# Patient Record
Sex: FEMALE | Race: BLACK OR AFRICAN AMERICAN | NOT HISPANIC OR LATINO | Employment: FULL TIME | URBAN - METROPOLITAN AREA
[De-identification: names, ages, dates, MRNs, and addresses within clinical notes are randomized per-mention and may not be internally consistent; named-entity substitution may affect disease eponyms.]

---

## 2019-11-25 ENCOUNTER — TELEPHONE (OUTPATIENT)
Dept: PSYCHIATRY | Facility: CLINIC | Age: 24
End: 2019-11-25

## 2019-11-25 NOTE — TELEPHONE ENCOUNTER
Behavorial Health Outpatient Intake Questions    Referred by: N/A    Please advised interviewee that they need to answer all questions truthfully to allow for best care and any misrepresentations of information may affect their ability to be seen at this clinic   => Was this discussed? Yes     Behavorial Health Outpatient Intake History -     Presenting Problem (in patient's words): previously diagnosed schizophrenia form/med management    Has the patient ever seen or is currently seeing a psychiatrist? Yes   If yes who/when? Dr Adelaida Giraldo- saw in August 2019    If seen as outpatient, have they been seen here (and by whom)? If not seen here, which provider(s) did the patient see and for how long? Has the patient ever seen or currently see a therapist? Yes If yes who/when? [facility]-August 2019  Has a member of the patient's family been in therapy here? No  If yes, with whom? Has the patient been hospitalized for mental health? Yes   If yes, how long ago was last hospitalization and where was it? [facility]- 2011 ( 5-7 days)  Substance Abuse:No concerns of substance abuse are reported  Does the patient have ICM or CTT? Yes    Is the patient taking injectable psychiatric medications? No    => If yes, patient cannot be seen here  Communications  Are there any developmental disabilities? No    Does the patient have hearing impairment? No       History-    Has the patient served in the [place] [address]? No    If yes, have you had combat services? No    Was the patient activated into federal active duty as a member of the [employer] or reserve? No    Legal History-     Does the patient have any history of arrests, long term/senior living time, or DUIs? No  If Yes-  1) What types of charges? 2) When were they last incarcerated? 3) Are they currently on parole or probation? Minor Child-    Who has custody of the child? N/A    Is there a custody agreement?  N/A    If there is a custody agreement remind parent that they must bring a copy to the first appt or they will not be seen  Intake Team, please check with provider before scheduling if flags come up such as:  - complex case  - legal history (other than DUI)  - communication barrier concerns are present  - if, in your judgment, this needs further review    ACCEPTED as a patient Yes  => Appointment Date: Thursday, 1/16/20 @ 1pm w/ Danelle Gongora; Friday, 1/24/20 @ 1pm w/ Guy Zavaleta    Referred Elsewhere? No    Name of Insurance Co: [employer] ID# [id number]  Insurance Phone #  If ins is primary or secondary  If patient is a minor, parents information such as Name, D  O B of guarantor

## 2020-02-03 ENCOUNTER — TELEPHONE (OUTPATIENT)
Dept: PSYCHIATRY | Facility: CLINIC | Age: 25
End: 2020-02-03

## 2020-02-24 ENCOUNTER — SOCIAL WORK (OUTPATIENT)
Dept: BEHAVIORAL/MENTAL HEALTH CLINIC | Facility: CLINIC | Age: 25
End: 2020-02-24
Payer: COMMERCIAL

## 2020-02-24 DIAGNOSIS — F43.20 ADJUSTMENT DISORDER, UNSPECIFIED TYPE: Primary | ICD-10-CM

## 2020-02-24 PROCEDURE — 90791 PSYCH DIAGNOSTIC EVALUATION: CPT | Performed by: SOCIAL WORKER

## 2020-02-24 NOTE — PSYCH
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 "an episode" while at college (adding during this "episode" exclusively shredded papers in her family home); She noted had had a voluntary  admission to [facility] (one week stay reported) to include a medication regimen which, per her report, with adjustments (Abilfy 25 mg to current dosage as 5 mg of Abilify for approximately 10 years)  Following her  admission and subsequent discharge, resumed her studies during Spring Semester, 2012    "I am working on my time management skills and sleep " states her PCP (since, August, 2019) has been managing her medication regimen; "I am reconnecting with my brothers (since her hospitalization)  " "Where have I been for the past 9 yeas ("since I got sick")  Previous Psychiatric/psychological treatment/year: [facility] psych since fall, 2011; "It was good "     Current Psychiatrist/Therapist: Psychiatric Evaluation scheduled with MEHNAZ Garcia, on 3/24/2020; Outpatient and/or Partial and Other Community Resources Used (CTT, ICM, VNA): Outpatient outpatient      Problem Assessment:     SOCIAL/VOCATION:  Family Constellation (include parents, relationship with each and pertinent Psych/Medical History):     No family history on file  Mother: age 62 (approximately); "We have our ups and downs  We are close, but I am very annoying  She has her moments as well "    Spouse: n/a    Father: dec'd since [address] age 8 or 7;    Children: n/a    ibling: alan, age 29; Client; alan, age 25; bro, age 25; Other: n/a    Geri relates best to my older brother, Chito Banda, my mom  she lives with three other girls (college students)  she does not live alone       Domestic Violence: No past history of domestic violence; no history sexual abuse reported; Additional Comments related to family/relationships/peer support:  Immigrated to the [address] from [place] when age 8 or 5;          School or Work History (strengths/limitations/needs):  undergraduate major: Psychology at [employer]; currently in graduate school at [employer] with a major in education with certification in [address] and [address]; 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[facility]) [address] [place] [address] and psych; "It is a good thing I am teaching  I am learning (teaching) strategies "    Her highest grade level achieved was 12 th grade     history includes: n/a    Financial status includes    LEISURE ASSESSMENT (Include past and present hobbies/interests and level of involvement (Ex: Group/Club Affiliations): music ("I am starting [employer] "); reading;   her primary language is [place]  Preferred language is [place]  Ethnic considerations are [place]  Religions affiliations and level of involvement [employer]   Does spirituality help you cope? No    FUNCTIONAL STATUS: There has been a recent change in [address] [facility] ability to do the following: n/a    Level of Assistance Needed/By Whom?: n/a    Geri learns best by  reading    SUBSTANCE ABUSE ASSESSMENT: no substance abuse    Substance/Route/Age/Amount/Frequency/Last Use: n/a    DETOX HISTORY: n/a    Previous detox/rehab treatment: n/a    HEALTH ASSESSMENT: PCP not notified     LEGAL: No Mental Health Advance Directive or Power of  on file    Prenatal History: N/A    Delivery History: N/A    Developmental Milestones: N/A  Temperament as an infant was N/A  Temperament as a toddler was N/A  Temperament at school age was N/A  Temperament as a teenager was N/A      Risk Assessment:   The following ratings are based on my observation of this patient over the last interview    Risk of Harm to Self:   Demographic risk factors include never  or  status  Historical Risk Factors include n/a  Recent Specific Risk Factors include "I can be hard on myself at times "  Additional Factors for a Child or Adolescent n/a    Risk of Harm to Others:   Demographic Risk Factors include reports immigrated to [place] at age 8 or 7;  Historical Risk Factors include "I did not have any friends through high school and undergrad "  Recent Specific Risk Factors include multiple stressors    Access to Weapons:   Alysa Armendariz has access to the following weapons: n/a   The following steps have been taken to ensure weapons are properly secured: n/a    Based on the above information, the client presents the following risk of harm to self or others:  low    The following interventions are recommended:   no intervention changes    Notes regarding this Risk Assessment:         Review Of Systems:     Mood Anxiety and Depression   Behavior Normal    Thought Content Normal   General Emotional Problems   Personality Normal   Other Psych Symptoms Normal   Constitutional n/a   ENT n/a   Cardiovascular n/a   Respiratory n/a   Gastrointestinal n/a   Genitourinary n/a   Musculoskeletal n/a   Integumentary n/a   Neurological n/a   Endocrine n/a         Mental status:  Appearance calm and cooperative , adequate hygiene and grooming and good eye contact    Mood mood appropriate   Affect affect was broad   Speech a normal rate   Thought Processes coherent/organized and normal thought processes   Hallucinations no hallucinations present    Thought Content no delusions   Abnormal Thoughts no suicidal thoughts  and no homicidal thoughts    Orientation  oriented to person and place and time   Remote Memory short term memory intact and long term memory intact   Attention Span concentration intact   Intellect Appears to be of Average Intelligence   Fund of Knowledge n/a   Insight Insight intact   Judgement judgment was intact   Muscle Strength n/a   Language n/a   Pain none   Pain Scale 0

## 2020-04-16 ENCOUNTER — TELEMEDICINE (OUTPATIENT)
Dept: BEHAVIORAL/MENTAL HEALTH CLINIC | Facility: CLINIC | Age: 25
End: 2020-04-16
Payer: COMMERCIAL

## 2020-04-16 ENCOUNTER — DOCUMENTATION (OUTPATIENT)
Dept: BEHAVIORAL/MENTAL HEALTH CLINIC | Facility: CLINIC | Age: 25
End: 2020-04-16

## 2020-04-16 DIAGNOSIS — F43.20 ADJUSTMENT DISORDER, UNSPECIFIED TYPE: Primary | ICD-10-CM

## 2020-04-16 PROCEDURE — 90834 PSYTX W PT 45 MINUTES: CPT | Performed by: SOCIAL WORKER

## 2020-04-30 ENCOUNTER — TELEMEDICINE (OUTPATIENT)
Dept: BEHAVIORAL/MENTAL HEALTH CLINIC | Facility: CLINIC | Age: 25
End: 2020-04-30
Payer: COMMERCIAL

## 2020-04-30 DIAGNOSIS — F43.20 ADJUSTMENT DISORDER, UNSPECIFIED TYPE: Primary | ICD-10-CM

## 2020-04-30 PROCEDURE — 90834 PSYTX W PT 45 MINUTES: CPT | Performed by: SOCIAL WORKER

## 2020-05-14 ENCOUNTER — TELEMEDICINE (OUTPATIENT)
Dept: BEHAVIORAL/MENTAL HEALTH CLINIC | Facility: CLINIC | Age: 25
End: 2020-05-14
Payer: COMMERCIAL

## 2020-05-14 DIAGNOSIS — F43.20 ADJUSTMENT DISORDER, UNSPECIFIED TYPE: Primary | ICD-10-CM

## 2020-05-14 PROCEDURE — 90834 PSYTX W PT 45 MINUTES: CPT | Performed by: SOCIAL WORKER

## 2020-06-11 ENCOUNTER — TELEMEDICINE (OUTPATIENT)
Dept: BEHAVIORAL/MENTAL HEALTH CLINIC | Facility: CLINIC | Age: 25
End: 2020-06-11
Payer: COMMERCIAL

## 2020-06-11 DIAGNOSIS — F43.20 ADJUSTMENT DISORDER, UNSPECIFIED TYPE: Primary | ICD-10-CM

## 2020-06-11 PROCEDURE — 90832 PSYTX W PT 30 MINUTES: CPT | Performed by: SOCIAL WORKER

## 2020-07-24 ENCOUNTER — TELEMEDICINE (OUTPATIENT)
Dept: BEHAVIORAL/MENTAL HEALTH CLINIC | Facility: CLINIC | Age: 25
End: 2020-07-24
Payer: COMMERCIAL

## 2020-07-24 DIAGNOSIS — F43.20 ADJUSTMENT DISORDER, UNSPECIFIED TYPE: Primary | ICD-10-CM

## 2020-07-24 PROCEDURE — 90832 PSYTX W PT 30 MINUTES: CPT | Performed by: PSYCHIATRY & NEUROLOGY

## 2020-07-24 NOTE — PSYCH
Virtual Regular Visit      Assessment/Plan:    Problem List Items Addressed This Visit        Other    Adjustment disorder - Primary      Session time 4876-3703 (total time 35 minutes)         Reason for visit is No chief complaint on file  Encounter provider OFE Tanner    Provider located at [address] [facility]  [address]  [place] [address]      Recent Visits  No visits were found meeting these conditions  Showing recent visits within past 7 days and meeting all other requirements     Future Appointments  No visits were found meeting these conditions  Showing future appointments within next 150 days and meeting all other requirements        The patient was identified by name and date of birth  Dane Marquez was informed that this is a telemedicine visit and that the visit is being conducted through [employer]  My office door was closed  No one else was in the room  She acknowledged consent and understanding of privacy and security of the video platform  The patient has agreed to participate and understands they can discontinue the visit at any time  Patient is aware this is a billable service  Subjective  Dane Marquez is a 30 y o  female presenting for initial session with this therapist after snf of her previous therapist [facility]  Geri shared  That she is getting ready to move out of her mom's apartment back up to [facility] 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   She teaches political science, 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 [place], and at this point does not know if the school will be in person, virtual or hybrid, which is creating a little bit of anxiety for her, but not too much    She said that she had been working with David Butcher on trying to reflect on situations and not rush to judgment or reacting rashly, and provided an example of ways that she has been implementing her work on this skill  Today she was ordering a new mattress for her new apartment, and the agent she was ordering from put in the wrong address at first   The agent made the correction after the purchase was completed, and told Geri that she had notified the warehouse of the correct address  Geri asked the agent three times if she was sure that the information was corrected and that the warehouse definitely got the correct address  The agent abruptly told her that it was taken care of and ended the chat  Luz Marina Irving said that she could have gotten angry or upset about that interaction, but took a minute to think about it, decided that she did not think she was being unreasonable trying to be sure that something she was spending a lot of money on would be delivered correctly, and that maybe the agent was just tired or was not able to manage the conversation as well as could have been done  She decided not to let it upset her and planned to contact another agent to confirm that the change was made instead, just to alleviate any anxiety that she might feel about the issue  Validated Geri's concerns and her need to make sure her purchase was completed correctly, and encouraged her for being able to take a few moments to think through her reaction and not allow it to upset her or ruin the rest of her day  A: Luz Marina Irving presented as pleasant, cheerful and openly engaged with this writer, and willingly shared what she wants to work on moving forward in therapy  She admits to sometimes overthinking things, although seems to not consider this as anxiety  P: Will continue to work with Luz Marina Irving to help her identify situations that may cause her to overthink or worry, and build coping strategies to help manage these reactions  Goals addressed: 1        HPI     No past medical history on file  No past surgical history on file  No current outpatient medications on file       No current facility-administered medications for this visit  Allergies not on file    Review of Systems    Video Exam    There were no vitals filed for this visit  Physical Exam     Psychiatric: calm and cooperative with good eye contact; mood euthymic, affect bright; thought process logical and organized; concentration intact; short and long term memory grossly intact; speech normal rate, volume and fluency; insight and judgment intact; denies SI HI and psychosis  I spent 35 minutes directly with the patient during this visit      VIRTUAL VISIT DISCLAIMER    Anusha Herbert acknowledges that she has consented to an online visit or consultation  She understands that the online visit is based solely on information provided by her, and that, in the absence of a face-to-face physical evaluation by the physician, the diagnosis she receives is both limited and provisional in terms of accuracy and completeness  This is not intended to replace a full medical face-to-face evaluation by the physician  Anusha Herbert understands and accepts these terms

## 2020-08-13 ENCOUNTER — TELEMEDICINE (OUTPATIENT)
Dept: BEHAVIORAL/MENTAL HEALTH CLINIC | Facility: CLINIC | Age: 25
End: 2020-08-13
Payer: COMMERCIAL

## 2020-08-13 DIAGNOSIS — F43.20 ADJUSTMENT DISORDER, UNSPECIFIED TYPE: Primary | ICD-10-CM

## 2020-08-13 PROCEDURE — 90834 PSYTX W PT 45 MINUTES: CPT | Performed by: PSYCHIATRY & NEUROLOGY

## 2020-08-13 NOTE — PSYCH
Virtual Regular Visit      Assessment/Plan:    Problem List Items Addressed This Visit        Other    Adjustment disorder - Primary        Session time [id number] (total time 49 minutes)       Reason for visit is No chief complaint on file  Encounter provider OFE Darden    Provider located at [address] [facility]  [address]  [facility] [address]      Recent Visits  No visits were found meeting these conditions  Showing recent visits within past 7 days and meeting all other requirements     Future Appointments  No visits were found meeting these conditions  Showing future appointments within next 150 days and meeting all other requirements        The patient was identified by name and date of birth  Jayy Morillo was informed that this is a telemedicine visit and that the visit is being conducted through [employer]  My office door was closed  No one else was in the room  She acknowledged consent and understanding of privacy and security of the video platform  The patient has agreed to participate and understands they can discontinue the visit at any time  Patient is aware this is a billable service  Subjective  Jayy Morillo is a 30 y o  female following up for adjustment disorder  Geri shared that she is now settled into her new apartment and is preparing to start school in about two weeks  She shared some anticipatory anxiety about going back into the school environment with Constantino shirley going on, but said that she feels the school is doing what they can to be proactive in taking care of students and teachers  She 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, so is busy trying to get ready for both  She talked about some incidents where she felt angry, but was able to allow herself to let go of her anger sooner 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)    She talked about dating online, not having any success with that, but wanting to have a relationship at this point in her life  She is "tired of being with myself," and is seeking companionship, but does not have a lot of social options at work and is not one to go out and socialize  She is open to trying online dating, and is willing to give it some time, because she wants to find a partner  A: Caryn Hamman continues to work on letting go of overthinking and holding onto anger, yet allowing herself to feel anger when justified  She now is more focused on finding a partner to fill emotional void at this stage in her life  P: Caryn Hamman will continue to work on letting go of unnecessary anger, and will continue to find ways to socialize and engage in relationships in order to feel more connected socially, potentially with a life partner   HPI     No past medical history on file  No past surgical history on file  No current outpatient medications on file  No current facility-administered medications for this visit  Allergies not on file    Review of Systems    Video Exam    There were no vitals filed for this visit  Physical Exam     I spent 49 minutes directly with the patient during this visit      VIRTUAL VISIT DISCLAIMER    Nathan Hu acknowledges that she has consented to an online visit or consultation  She understands that the online visit is based solely on information provided by her, and that, in the absence of a face-to-face physical evaluation by the physician, the diagnosis she receives is both limited and provisional in terms of accuracy and completeness  This is not intended to replace a full medical face-to-face evaluation by the physician  Nathan Hu understands and accepts these terms

## 2020-08-26 ENCOUNTER — TELEMEDICINE (OUTPATIENT)
Dept: PSYCHIATRY | Facility: CLINIC | Age: 25
End: 2020-08-26
Payer: COMMERCIAL

## 2020-08-26 VITALS — WEIGHT: 122 LBS | BODY MASS INDEX: 23.95 KG/M2 | HEIGHT: 60 IN

## 2020-08-26 DIAGNOSIS — F25.0 SCHIZOAFFECTIVE DISORDER, BIPOLAR TYPE (HCC): Primary | ICD-10-CM

## 2020-08-26 DIAGNOSIS — I10 BENIGN ESSENTIAL HYPERTENSION: ICD-10-CM

## 2020-08-26 PROBLEM — F20.9 SCHIZOPHRENIA (HCC): Status: ACTIVE | Noted: 2020-08-04

## 2020-08-26 PROCEDURE — 90791 PSYCH DIAGNOSTIC EVALUATION: CPT | Performed by: NURSE PRACTITIONER

## 2020-08-26 RX ORDER — AMLODIPINE BESYLATE 5 MG/1
TABLET ORAL
COMMUNITY
Start: 2020-05-04

## 2020-08-26 RX ORDER — HYDROCHLOROTHIAZIDE 12.5 MG/1
TABLET ORAL
COMMUNITY
Start: 2020-08-05

## 2020-08-26 RX ORDER — LORATADINE 10 MG/1
TABLET ORAL
COMMUNITY
Start: 2020-07-21

## 2020-08-26 RX ORDER — ARIPIPRAZOLE 10 MG/1
5 TABLET ORAL DAILY
COMMUNITY
Start: 2017-10-12 | End: 2020-08-26 | Stop reason: SDUPTHER

## 2020-08-26 RX ORDER — MULTIVITAMIN
1 TABLET ORAL
COMMUNITY

## 2020-08-26 RX ORDER — ARIPIPRAZOLE 10 MG/1
TABLET ORAL
Qty: 30 TABLET | Refills: 1 | Status: SHIPPED | OUTPATIENT
Start: 2020-08-26 | End: 2020-12-28 | Stop reason: SDUPTHER

## 2020-08-26 RX ORDER — NORGESTIMATE AND ETHINYL ESTRADIOL 7DAYSX3 LO
1 KIT ORAL DAILY
COMMUNITY
Start: 2020-08-04 | End: 2021-08-04

## 2020-08-26 RX ORDER — OMEPRAZOLE 40 MG/1
CAPSULE, DELAYED RELEASE ORAL
COMMUNITY
Start: 2020-07-05

## 2020-08-26 NOTE — PSYCH
Virtual Regular Visit      Assessment/Plan:    Problem List Items Addressed This Visit        Cardiovascular and Mediastinum    Benign essential hypertension    Relevant Medications    amLODIPine (NORVASC) 5 mg tablet    hydrochlorothiazide (HYDRODIURIL) 12 5 mg tablet    ARIPiprazole (ABILIFY) 10 mg tablet    Other Relevant Orders    ECG 12 lead    CBC and differential    Comprehensive metabolic panel    Lipid panel    HEMOGLOBIN A1C W/ EAG ESTIMATION    TSH, 3rd generation with Free T4 reflex       Other    Schizoaffective disorder, bipolar type (HCC) - Primary    Relevant Medications    ARIPiprazole (ABILIFY) 10 mg tablet    Other Relevant Orders    ECG 12 lead    CBC and differential    Comprehensive metabolic panel    Lipid panel    HEMOGLOBIN A1C W/ EAG ESTIMATION    TSH, 3rd generation with Free T4 reflex               Reason for visit is   Chief Complaint   Patient presents with    Virtual Regular Visit    Mood Swings    Psychosis    Establish Care    Anxiety    Sleeping Problem        Encounter provider Kristina Lucia, [address] [place] [address]    Provider located at [address] [place] [address] [address]  [facility] [id number]      Recent Visits  No visits were found meeting these conditions  Showing recent visits within past 7 days and meeting all other requirements     Today's Visits  Date Type Provider Dept   08/26/20 [address] today's visits and meeting all other requirements     Future Appointments  No visits were found meeting these conditions  Showing future appointments within next 150 days and meeting all other requirements        The patient was identified by name and date of birth  Hermes Sanders was informed that this is a telemedicine visit and that the visit is being conducted through [employer]  My office door was closed  No one else was in the room    She acknowledged consent and understanding of privacy and security of the video platform  The patient has agreed to participate and understands they can discontinue the visit at any time  Patient is aware this is a billable service  Past Medical History:   Diagnosis Date    Acid reflux     Schizoaffective disorder ([facility] [address] )        History reviewed  No pertinent surgical history  Current Outpatient Medications   Medication Sig Dispense Refill    amLODIPine (NORVASC) 5 mg tablet       ARIPiprazole (ABILIFY) 10 mg tablet Take 1/2 tab (5mg) PO daily 30 tablet 1    hydrochlorothiazide (HYDRODIURIL) 12 5 mg tablet       loratadine (CLARITIN) 10 mg tablet       Multiple Vitamin (multivitamin) tablet Take 1 tablet by mouth      norgestimate-ethinyl estradiol (ORTHO TRI-CYCLEN LO) 0 18/0 215/0 25 MG-25 MCG per tablet Take 1 tablet by mouth daily      omeprazole (PriLOSEC) 40 MG capsule        No current facility-administered medications for this visit  Allergies   Allergen Reactions    Other      Seasonal allergy           Vitals:    08/26/20 0912   Weight: 55 3 kg (122 lb)   Height: 5' (1 524 m)         I spent 65 minutes directly with the patient during this visit      VIRTUAL VISIT DISCLAIMER    Renae Del Cid acknowledges that she has consented to an online visit or consultation  She understands that the online visit is based solely on information provided by her, and that, in the absence of a face-to-face physical evaluation by the physician, the diagnosis she receives is both limited and provisional in terms of accuracy and completeness  This is not intended to replace a full medical face-to-face evaluation by the physician  Renae Nehemias understands and accepts these terms       [address]    Name and Date of Birth:  Renae Del Cid 30 y o  1995 MRN: [id number]    Date of Visit: August 26, 2020    Sources of information: Information for this evaluation was gathered through direct interview with the patient  Additionally patient the patient of Nancy sutton at [facility]    Confidentiality discussion: Limits of confidentiality in cases of safety concerns involving self and others as well as this physician's role as a mandatory  of abuse  They voiced understanding and a desire to continue with the evaluation    Reason for visit:   Chief Complaint   Patient presents with    Virtual Regular Visit    Mood Swings    Psychosis    Establish Care    Anxiety    Sleeping Problem     HPI     David Winn is a 30 y o  female with a history of Schizoaffective bipolar type, adjustment disorder and sleep disturbance with potential OCD symptoms who presents for psychiatric evaluation due to for psychiatric medication management  David Winn states she was born in [facility] to [facility] and father who passed away when she was 8 or 5  She states to her knowledge she met all of her developmental milestones  She has one older brother and 2 younger brothers  (of note they all have same 1st name and different 2nd names as this is how names are delineated in [facility])  She states she moved to the [address] when she was in 2nd grade, she states she came on her own and moved in with her mom, maternal grandmother and aunt  She states her mom was already in the [address] prior to her arrival in the [address] (She states her brothers came to the [address] after her-they were staying with one of her mother's good friends in [place] [facility])  David Winn states her childhood was good growing up "everything at home was fine, school was fine even though I did not have a lot of friends I loved doing my work "  She states she was in advanced classes in high school and she graduated on time  She went to [employer] and 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 [employer]        She reports one inpatient psychiatric admission was during her freshman year of college at [employer] (first semester), she states "I had an episode "  She states after she graduated from high school she was watching Anime on the computer all day and night and she was not sleeping (her lack of sleep started senior year of high school 2-3 hours of sleep per night and she states she started falling asleep in class)  She states her   attempted to get her help but "I graduated before he could get me the help I needed "  She states she became almost addicted to [place], comics and Anime and she was not sleeping "I just wanted to read and see more "      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 she was trying to memorize a lot of data from the books  "I was trying to memorize everything word for word from the book, it was a terrible time because I couldn't do it "  She states the lack of sleep and stress lead to her having the episode of not going to school one day, (she commuted back and forth to [facility]) she states she was sitting at home shredding paper with her hands for 8 hours and her mom called for help  She states she was threatening her family, screaming and shouting and she was taken for IP psychiatric eval       Scince that time she has been on Risperdal which she did not like as there was weight gain and did not feel well and was not helping (stopped after 1 month), started on Abilify-was up to 25 mg but has since been titrated down and has been on 5 mg now for approximately 2 years and she has been doing well  She has been in counseling  She continues to have moments of paranoia  In terms of depression, the patient endorses change in sleep, depressed mood, loss of energy, loss of interests/pleasure, trouble concentrating This was during the episode in 2011      PANDA symptoms: no symptoms suggestive of PANDA    Panic Disorder symptoms: no symptoms suggestive of panic disorder    Social Anxiety symptoms: no symptoms suggestive of social anxiety    OCD Symptoms: No significant symptoms supportive of OCD however I think it is important to note that patient's episode occurred due to her obsessive desires to watch Anime, read [employer], read comics and plan the computer  This led to obsessive hours and thoughts about needing to be involved in a virtual world  She reports that she was unable to focus on other activities and she would not sleep, she would stay up all night engaged in the above activities  This started during her senior year of high school and progressed over the summer leading to significant lack of sleep prior to her going away to college her freshman year  She states she attempted to keep up with the use of sessions as well as study and memorize information in her [employer] major as well as commute back and forth to [facility] which led to her episode    She reports not sleeping more than 2 hours per night for several months  In terms of jonnathan, the patient endorses yes, one episode  Symptoms include Irritability, decreased sleep , distractibility, increased goal-directed behavior and psychomotor agitation    Eating Disorder symptoms: no historical or current eating disorder  no binge eating disorder; no anorexia nervosa  no symptoms of bulimia    In terms of PTSD, the patient endorses exposure to trauma involving:  Denies symptoms of PTSDIn terms of psychotic symptoms, the patient reports No psychotic symptoms now but history of, auditory hallucinations during episode in 2011  Symptoms first started abruptly 10 years ago and improved and remained stable over the last 1 and 1/2 years  Stressors preceding visit included COVID-19 issues  She states she knows she has to stay away from the news and she knows she has to stay on medication and keep up with therapy  She denies suicidal ideation, intent or plan at present, denies homicidal ideation, intent or plan at present      She denies auditory hallucinations, denies visual hallucinations, still has periodic vague paranoid ideation  She denies any side effects from current psychiatric medications        Psychiatric Review Of Systems:    Sleep changes: struggles with sleep since 2011 (current to bed at 11:30pm-7am) no nightmares  Appetite changes: no  Weight changes: weight loss 22 lb  Took sharonatley 2 years to decrease weight as she was attempting to get back to normal weight  Energy/anergy: no change  Interest/pleasure/anhedonia: no  Somatic symptoms: no  Anxiety/panic: worrying daily  Niharika: no  Guilty/hopeless: no  Self injurious behavior/risky behavior: no  Suicidal ideation: no  Homicidal ideation: no  Auditory hallucinations: not at present, past auditory hallucinations  Visual hallucinations: no  Other hallucinations: no  Delusional thinking: yes, When she had event in 2011 felt paranoid as "though people were watching and staring at me and judging me" She states at times if someone is staring at he she "will feel slightly paranoid but most of the time I can brush it off and I am working on this with my therapist"  "I don't think I have been truly paranoid in 2-3 years "   Eating disorder history: no  Obsessive/compulsive symptoms: no    Review Of Systems:    Mood anxiety and depression   Behavior appropriate, cooperative and calm   Thought Content daily worries and Occasional paranoid thoughts   General emotional problems   Personality normal   Other Psych Symptoms normal   Constitutional negative   ENT negative   Cardiovascular negative   Respiratory negative   Gastrointestinal negative   Genitourinary negative   Musculoskeletal negative   Integumentary negative   Neurological negative   Endocrine negative   Other Symptoms none, all other systems are negative       Past Psychiatric History:     Past Inpatient Psychiatric Treatment:   One past inpatient psychiatric admission at [facility] in 2011 during 1st year of college while in [facility]  Admitted for paranoia and bizzarre behavior of shredding paper with her fingers (she does not have a clear memory of what happened)  Past Outpatient Psychiatric Treatment:    2011 Therapist Gatito Calderon and after Julian Anton left [facility] Alejandra Barr in [facility] & psychiatrist Dr Chely Kent 2011; Radha at [facility] 2019 and now Gabrielle Sandhu at [facility]  Past Suicide Attempts: no  Past Violent Behavior: no  Past Psychiatric Medication Trials: Risperdal and Abilify    Traumatic History:     Abuse: no history of physical or sexual abuse, positive history of physical abuse, no history of emotional abuse, no history of verbal abuse  Other Traumatic Events: none     Family Psychiatric History:     Suicide Attempts or Completions: denies  Substance Abuse: denies  Denies family history of psychiatric illness  Family History   Problem Relation Age of Onset    Hypertension Mother     No Known Problems Brother     Alzheimer's disease Maternal Grandfather     Hypertension Maternal Grandmother     Dialysis Maternal Grandmother     No Known Problems Brother     No Known Problems Brother          Substance Use History:    Cigarettes:  Never Used  Vap/E Cigarettes: Never  Smokeless Tobacco: Never  Alcohol: None (wedding a sip)  Illicit Substances: Never  Caffeine: No coffee, soda 1 8 oz q 2 weeks      Social History     Substance and Sexual Activity   Alcohol Use Not Currently    Comment: sip at a wedding     Social History     Substance and Sexual Activity   Drug Use Never     E-Cigarette/Vaping    E-Cigarette Use Never User        Social History:    Social History     Socioeconomic History    Marital status: Single     Spouse name: Not on file    Number of children: 0    Years of education: Not on file    Highest education level:  Bachelor's degree (e g , BA, AB, BS)   Occupational History    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[place] [address] resource strain: Not on file    Food insecurity     Worry: Not on file Inability: Not on file    Transportation needs     Medical: Not on file     Non-medical: Not on file   Tobacco Use    Smoking status: Never Smoker    Smokeless tobacco: Never Used   Substance and Sexual Activity    Alcohol use: Not Currently     Comment: sip at a wedding    Drug use: Never    Sexual activity: Not Currently     Birth control/protection: OCP   Lifestyle    Physical activity     Days per week: Not on file     Minutes per session: Not on file    Stress: Not on file   Relationships    Social connections     Talks on phone: Not on file     Gets together: Not on file     Attends Pentecostal service: Not on file     Active member of club or organization: Not on file     Attends meetings of clubs or organizations: Not on file     Relationship status: Not on file    Intimate partner violence     Fear of current or ex partner: No     Emotionally abused: No     Physically abused: No     Forced sexual activity: No   Other Topics Concern    Not on file   Social History Narrative    Not on file       Education level: in [place] [id number] at [employer] ([employer] and Social Studies)  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)  Marital status: never   Children: none  Current Living Situation: the patient currently lives in a house with roommates (rent)   Social support: mom Mega Bautista and oldest brother Tuyet Das: Nette Burn experience: None  Legal history: Denies  Access to [employer]: Denies    Past Medical History:    Concussion:  denies  Seizures: denies    Past Medical History:   Diagnosis Date    Acid reflux     Schizoaffective disorder ([facility] [address] )      Past Medical History Pertinent Negatives:   Diagnosis Date Noted    Disease of thyroid gland 08/26/2020    Head injury 08/26/2020    Seizures ([facility] [address] ) 08/26/2020     History reviewed  No pertinent surgical history    Allergies   Allergen Reactions    Other Seasonal allergy         History Review:     The following portions of the patient's history were reviewed and updated as appropriate: allergies, current medications, past family history, past medical history, past social history, past surgical history and problem list       Video Exam    OBJECTIVE:    Vital signs in last 24 hours:    Vitals:    08/26/20 0912   Weight: 55 3 kg (122 lb)   Height: 5' (1 524 m)       Mental Status Evaluation:    Appearance age appropriate, casually dressed   Behavior cooperative, calm, good eye contact   Speech normal rate, normal volume, normal pitch   Mood mildly anxious   Affect normal range and intensity, appropriate   Thought Processes organized, goal directed, linear   Associations intact associations   Thought Content no overt delusions   Perceptual Disturbances: no auditory hallucinations, no visual hallucinations   Abnormal Thoughts  Risk Potential Suicidal ideation - None  Homicidal ideation - None  Potential for aggression - No   Orientation oriented to person, place, time/date and situation   Memory recent memory intact, remote memory mildly impaired   Consciousness alert and awake   Attention Span Concentration Span attention span and concentration are age appropriate   Intellect appears to be of average intelligence   Insight intact   Judgement intact   Muscle Strength and  Gait unable to assess today due to virtual visit   Motor Activity unable to assess today due to virtual visit   Language no difficulty naming common objects, no difficulty repeating a phrase, unable to assess writing today due to virtual visit   Fund of Knowledge adequate knowledge of current events  adequate fund of knowledge regarding past history  adequate fund of knowledge regarding vocabulary    Pain none   Pain Scale 0       Laboratory Results: Most Recent Labs: No results found for: WBC, RBC, HGB, HCT, PLT, RDW, NEUTROABS, NA, K, CL, CO2, BUN, CREATININE, GLUCOSE, CALCIUM, AST, ALT, ALKPHOS, PROT, BILITOT, CHOL, HDL, TRIG, LDLCALC, VALPROICTOT, CARBAMAZEPIN, LITHIUM, AMMONIA, [id number], FREET4, T3FREE, PREGTESTUR, PREGSERUM, HCG, HCGQUANT, RPR    CBC  Component Name  2011     5 9   4 63   13 0   37 8   82   28 2   34 5   14 4   289   8 5 (L)   WBC   RBC   HGB   HCT   MEAN CELL VOLUME   [address]   RDW   PLT   MPV    CHEM PROFILE  Component Name  2011     86   12   0 5   >60   >60   141   3 7   107   27   7   280   9 5   Glucose   BUN   Creatinine   EST GLOMERULAR FILTRATION RATE   Estimated Glomerular Filtration Rate, African American   Sodium   Potassium   Chloride   Carbon Dioxide   ANGP   Osmolality, Calculation   Calcium    DIABETES  Component Name  2011     86   Glucose    DIFFERENTIAL  Component Name  2011     26 4   11 3 (H)   57 4   3 8   1 1   1 6   0 7   3 3   0 2   0 1   NO   Lymphocytes %   Monocytes %   Neutrophils %   Eosinophils %   Basophils %   Lymphocytes Absolute   Monocytes Absolute   Neutrophils Absolute   Eosinophils Absolute   Basophils Absolute   Pathologist Review    GI / LIVER FUNCTION  Component Name  2011     4 0   6 5   0 3   76   19   24   Albumin   TOT PROT   T BILI   Alkaline Phosphatase   Alanine Aminotransferase   Aspartate Aminotransferase     SCREEN  Component Name  2011     2 03   Thyroid Stimulating Hormone    PATH REFERRAL  Component Name  2011     NO   Pathologist Review    PROTEIN ELP  Component Name  2011     6 5   TOT PROT    THYROID  Component Name  2011     2 03   Thyroid Stimulating Hormone    HCGU PROFILE-EPH  Component Name  2011     NEG   HCGU PROFILE-EPH    RAPID DRUG SCREEN, URINE  Component Name  2011     NEGATIVE   NEGATIVE   NEGATIVE   URINE BARBITUATE SCREEN   URINE BENZO SCREEN   Methaqualone Screen    TOX, URINE  Component Name  2011     NEGATIVE   NEGATIVE   NEGATIVE   NEGATIVE   NEGATIVE   NEGATIVE   273 2 (NEG)   Amphetamine Screen, Urine   Cocaine Metabolites, Urine Opiate Screen, Urine   Cannabinoid Screen, Urine   Phencyclidine Screen, Urine   Methadone Screen, Urine   Oxycodone Screen, Urine       Assessment/Plan:     Scales:    MDQ=7       Diagnoses and all orders for this visit:    Schizoaffective disorder, bipolar type (HCC)  -     ARIPiprazole (ABILIFY) 10 mg tablet; Take 1/2 tab (5mg) PO daily  -     ECG 12 lead; Future  -     CBC and differential; Future  -     Comprehensive metabolic panel; Future  -     Lipid panel; Future  -     HEMOGLOBIN A1C W/ EAG ESTIMATION; Future  -     TSH, 3rd generation with Free T4 reflex; Future    Benign essential hypertension  -     ARIPiprazole (ABILIFY) 10 mg tablet; Take 1/2 tab (5mg) PO daily  -     ECG 12 lead; Future  -     CBC and differential; Future  -     Comprehensive metabolic panel; Future  -     Lipid panel; Future  -     HEMOGLOBIN A1C W/ EAG ESTIMATION; Future  -     TSH, 3rd generation with Free T4 reflex; Future    Other orders  -     amLODIPine (NORVASC) 5 mg tablet  -     hydrochlorothiazide (HYDRODIURIL) 12 5 mg tablet  -     loratadine (CLARITIN) 10 mg tablet  -     Discontinue: ARIPiprazole (ABILIFY) 10 mg tablet; Take 5 mg by mouth daily  -     Multiple Vitamin (multivitamin) tablet; Take 1 tablet by mouth  -     omeprazole (PriLOSEC) 40 MG capsule  -     norgestimate-ethinyl estradiol (ORTHO TRI-CYCLEN LO) 0 18/0 215/0 25 MG-25 MCG per tablet; Take 1 tablet by mouth daily          Confidential assessment:    Geri this 57-year-old female who was born and raised until the age of 8 or 7 in [facility] by her mother and biological father who passed away from an unknown disease  She states her mother does not discuss it and she states it [place], he got sick and they were not able to save him my mom does not talk about it    She reports that her mother moved to the [facility] to live with her mother and sister(patient's grandmother an aunt) and patient and her older brother and 2 younger brothers were left behind in [place] with their mother's best friend  The 3 children were eventually brought to the [facility] 1 at a time as their mother could afford it  She denies any abuse growing up  She reports meeting all milestones and she was in honors classes in high school and graduated on time  She states that she did not have a lot of friends in general as she was quiet however she loved her studies  She states in her senior year of high school she joined [employer] and track  She states this is when she started to have decreased sleep  She states that she was in honors courses, she was very involved in the to clubs as well as she loved reading and she would stay up at night focusing on Anime books, [employer], comic, videos etc she states she was stable on the computer for hours and she was getting approximately 2 hours of sleep per night  Patient states that she started falling asleep in class toward the end of the year and 1 of her teachers approach her about this was going to get her mental health help however she graduated before this occurred  She states that her behaviors of staying up all night on the computer and having obsessive thoughts about Anime and Manga and the fantasy world continued over the summer she continued only have approximately 2 hours of sleep per night  She was a commuter student back and forth to 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) she did not go to school and she refers to this as the episode "  She states, "I was just sitting there shredding paper with my hands for 8 hours straight, my family was trying to help me and I was screaming at them and threatening them and I would not let them help me and they tried to reach a counselor and they finally had a call to have me taken away    She reports this is her only inpatient admission to psychiatric [facility]  She reports at this time she was extremely paranoid and she believes she was having auditory hallucinations at this time as well however she cannot recall much from this "episode "      Since 2011 she has been under the care of therapists and Psychiatry however this provider does not have documents or records to review  Patient states she was initiated on Risperdal however she did not like how she felt on this and she did not feel that it worked  She was only on for approximately 2 weeks and then discontinued and initiated on Abilify  She states the highest dose of Abilify was 25 mg which was maintained for some time  She denies any side effects to Abilify however she has been slowly titrated down over the years and she has been on approximately 5 mg for the last 2 years without issue and she has not had significant paranoia for the last 1 and half-2 years     She was given a diagnosis of schizoaffective bipolar type  At the time in 2011 it sounds as though she may have met the criteria as she was staying up for days on and she reports having at least a minimum of 72 hours of being wake and not missing her sleep, she reports being hyper, being distracted being irritable having appetite changes, having psychomotor agitation and having significant increased goal-directed activity  Of note she states this did build up over time in the majority of these behaviors occurred after her significant lack of sleep (meaning the irritability, agitation, psychomotor agitation, appetite changes)    She reports her increased goal-directed activity or her obsessive thoughts about the Anime and comics are what led to her lack of sleep  She then experienced psychosis while she was up for approximately 72 hours as well as significant irritability and agitation during that same period of time  After the episode she reports having significant difficulty with self-care which included challenges with showering, brushing her teeth, feeling down, decreased appetite  She reports Abilify helped all of these symptoms and she has been well since that time  She continues in psychotherapy  This provider feels as though she did have obsessive behaviors and this should be monitored  There is also diagnosis of adjustment disorder on her chart however this needs to be further explored as her symptoms started well before her move to college in 2011  Suicide/Homicide Risk Assessment:    Risk of Harm to Self:  The following ratings are based on assessment at the time of the interview  Demographic risk factors include:  [place] (age 12-24)  Historical Risk Factors include: chronic psychiatric problems  Recent Specific Risk Factors include: diagnosis of mood disorder  Protective Factors: no current suicidal ideation, able to manage anger well, access to mental health treatment, compliant with medications, compliant with mental health treatment, effective coping skills, effective decision-making skills, effective problem solving skills, good health, having a desire to be alive, resiliency, responsibilities and duties to others, stable living environment, stable job, supportive family  Weapons: none  The following steps have been taken to ensure weapons are properly secured: not applicable  Based on today's assessment, Noe Mosher presents the following risk of harm to self: minimal    Risk of Harm to Others:   The following ratings are based on assessment at the time of the interview  Based on today's assessment, Geri presents the following risk of harm to others: minimal    The following interventions are recommended: no intervention changes needed    Treatment Recommendations:    Geri this 58-year-old female 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  She reports when she was a freshman in college she had a episode which led to the diagnosis of schizoaffective bipolar type  Currently she has been stable on medication, she has not had significant paranoid ideation for approximately 1 and half to 2 years and she has been stable on Abilify 5 mg p o  Daily  She was previously on doses up to 25 mg p o  Daily however this has been titrated down over time and she has been doing well/stable on 5 mg  She denies any current auditory or visual hallucinations  She reports having auditory hallucinations however she has a difficult time recalling everything that occurred during her Aleksey Danker which was in 2011 and she is unable to elaborate on the auditory hallucinations  She has not experienced any hallucinations since that time and she denies ever experiencing visual hallucinations  At this time she states that she will have fleeting paranoid ideation if somebody is staring at her however she states she is able to easily talk herself through this and she continues to work with her therapist on such feelings  She denies symptoms of depression, anxiety, she denies any mood swings, denies irritability or agitation, denies any episodes of jonnathan, reports sleeping approximately 8 hours per night currently  She denies racing thoughts, denies feelings of being distracted, denies lack of energy or motivation  She feels well on her current medication and does not feel that she needs any changes  She denies suicidal or homicidal ideation  At this time this provider will maintain her current diagnosis however this will continue to be evaluated as I do not have any old records to review    She may also have components of OCD which will need to be monitored as well     Education about diagnosis and treatment modalities, patient voiced understanding and agreement with the following plan:    -patient gave this provider verbal release of information to review records from [facility], [facility] and [facility] in other words all records in the [address] [place] [address] to review labs, records and EKGs  This provider was only able to find labs from 2011 and patient will need to have new labs obtained  -CBC, CMP, lipid panel, TSH, EKG, hemoglobin A1c ordered as patient is on antipsychotic medication     -continue Abilify 5 mg p o  Daily to maintain symptoms of schizoaffective bipolar type  -continue psychotherapy with Kacey sutton    -followup with this provider on November 30, 2020 at 1:30 p m  And again on 11/09/2020 at 3:00 p m     -Patient will call if issues or concerns     -Discussed self monitoring of symptoms, and symptom monitoring tools     -Patient has been informed of 24 hours and weekend coverage for urgent situations accessed by calling the main clinic phone number      [facility] Crisis Telephone Numbers and the National Suicide Prevention Hotline Number Provided to Patient     -Treatment Plan completed with psychotherapist at [facility]    Risks/Benefits/Precautions:      Risks, Benefits And Possible Side Effects Of Medications:    Risks, benefits, and possible side effects of medications explained to [facility] and she verbalizes understanding and agreement for treatment  Risks of medications in pregnancy explained to [facility]  She verbalizes understanding and agrees to notify her doctor if she becomes pregnant      Controlled Medication Discussion:     Not applicable    MEHNAZ Mancini

## 2020-08-27 ENCOUNTER — TELEPHONE (OUTPATIENT)
Dept: PSYCHIATRY | Facility: CLINIC | Age: 25
End: 2020-08-27

## 2020-08-27 NOTE — TELEPHONE ENCOUNTER
I spoke with [facility] and reviewed feedback from [facility] thought it was "funny" that there were no results when she knows she's had blood work done yearly  I reviewed the results would have to have been entered in this system in order to be viewed  I told her she can contact her doctor's office and request lab work be forwarded to this office  Number given for fax:  [phone]  Geri questioned the need for an EKG  I reviewed procedure and importance of test and she seemed more agreeable  She agreed I could send the EKG requisition to her  I also let her know I was sending the lab req as well, so she would have that pending lab results she will request be sent to [place]  I asked that she call the office to let us know lab results will be coming from another office       When address verified, [facility] gave the following address and  requisitions sent to:  [address] [facility], [address] [facility]

## 2020-08-27 NOTE — TELEPHONE ENCOUNTER
----- Message from [employer] sent at 8/26/2020  7:15 PM EDT -----  Hi this patient gave me verbal release of information to review records from other institutions in [facility] during her initial intake  After the interview I went into her epic records to look at old labs  Unfortunately the only labs I can find her from 2011  Can you please call her and let her know I placed new orders in the epic system and I would like her to have labs drawn as a baseline as well as an EKG  She is on Abilify and I need baseline labs  All labs the system are to old  If she goes to a [facility] lab she will not need to have the slips  If she is going to a different lab they should be at the  to be mailed    Thank you very much

## 2020-08-28 ENCOUNTER — TELEMEDICINE (OUTPATIENT)
Dept: BEHAVIORAL/MENTAL HEALTH CLINIC | Facility: CLINIC | Age: 25
End: 2020-08-28
Payer: COMMERCIAL

## 2020-08-28 DIAGNOSIS — F25.0 SCHIZOAFFECTIVE DISORDER, BIPOLAR TYPE (HCC): Primary | ICD-10-CM

## 2020-08-28 PROCEDURE — 90834 PSYTX W PT 45 MINUTES: CPT | Performed by: PSYCHIATRY & NEUROLOGY

## 2020-08-28 NOTE — PSYCH
Virtual Regular Visit      Assessment/Plan:    Problem List Items Addressed This Visit        Other    Schizoaffective disorder, bipolar type ([facility] [address] ) - Primary        Session time 3917-4812 (total time 49 minutes)       Reason for visit is No chief complaint on file  Encounter provider OFE Dacosta    Provider located at [id number] [place] [address] [address]  [facility] [id number]      Recent Visits  No visits were found meeting these conditions  Showing recent visits within past 7 days and meeting all other requirements     Future Appointments  No visits were found meeting these conditions  Showing future appointments within next 150 days and meeting all other requirements        The patient was identified by name and date of birth  Harinder Coyle was informed that this is a telemedicine visit and that the visit is being conducted through [employer]  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  She talked about an interaction with her "mentor" Stephen Reyes who teaches the younger grades of [employer] 1 and 2, who helped her a lot last year  Today Stephen Reyes was telling her what to do, what textbooks to use 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, and Shahzad Canada did not really like this, as she had already planned out her class    When Shahzad Canada asked Stephen Reyes some questions, Stephen Reyes went to administration and complained that Shahzad Canada was not being cooperative with her, which made Shahzad Canada upset because they could have cleared up the misunderstanding had Gene Lancaster just talked to her directly about it  Discussed with Clinton Lozano how she could handle the interactions with [facility] in the future so as not to allow Gene Lancaster to make her so upset, and Clinton Lozano was open to suggestions  Clinton Lozano also talked about how she has been starting to do more things for herself, such as making decisions for her class, and dressing up for school instead of being more 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, and recognizing that she does not have to "fit in" with everyone else just so they do not  her  She is recognizing that she can be more true to herself to make herself happy and not worry about the opinions of others  A: Clinton Lozano continues to make progress toward her goal of being more reflective and considering how to respond to situations, and is gaining insight into herself and her self-confidence  P: Clinton Lozano will continue to focus on thinking about how she will respond to others, including just saying a simple "thank you" to offered advice and then carrying on with her own plan  Goals addressed: 1    Psychiatric: calm and cooperative with good eye contact; mood euthymic, affect bright; thought process logical and organized; speech and behavior normal; concentration intact; insight and judgment intact; denies SI HI and psychosis  HPI     Past Medical History:   Diagnosis Date    Acid reflux     [facility]        No past surgical history on file      Current Outpatient Medications   Medication Sig Dispense Refill    amLODIPine (NORVASC) 5 mg tablet       ARIPiprazole (ABILIFY) 10 mg tablet Take 1/2 tab (5mg) PO daily 30 tablet 1    hydrochlorothiazide (HYDRODIURIL) 12 5 mg tablet       loratadine (CLARITIN) 10 mg tablet       Multiple Vitamin (multivitamin) tablet Take 1 tablet by mouth      norgestimate-ethinyl estradiol (ORTHO TRI-CYCLEN LO) 0 18/0 215/0 25 MG-25 MCG per tablet Take 1 tablet by mouth daily      omeprazole (PriLOSEC) 40 MG capsule        No current facility-administered medications for this visit  Allergies   Allergen Reactions    Other      Seasonal allergy         Review of Systems    Video Exam    There were no vitals filed for this visit  Physical Exam     I spent 49 minutes directly with the patient during this visit      VIRTUAL VISIT DISCLAIMER    Keanu Love acknowledges that she has consented to an online visit or consultation  She understands that the online visit is based solely on information provided by her, and that, in the absence of a face-to-face physical evaluation by the physician, the diagnosis she receives is both limited and provisional in terms of accuracy and completeness  This is not intended to replace a full medical face-to-face evaluation by the physician  Keanu Love understands and accepts these terms

## 2020-09-11 ENCOUNTER — TELEMEDICINE (OUTPATIENT)
Dept: BEHAVIORAL/MENTAL HEALTH CLINIC | Facility: CLINIC | Age: 25
End: 2020-09-11
Payer: COMMERCIAL

## 2020-09-11 DIAGNOSIS — F25.0 SCHIZOAFFECTIVE DISORDER, BIPOLAR TYPE (HCC): ICD-10-CM

## 2020-09-11 DIAGNOSIS — F43.20 ADJUSTMENT DISORDER, UNSPECIFIED TYPE: Primary | ICD-10-CM

## 2020-09-11 PROCEDURE — 90834 PSYTX W PT 45 MINUTES: CPT | Performed by: PSYCHIATRY & NEUROLOGY

## 2020-09-11 NOTE — PSYCH
Virtual Regular Visit      Assessment/Plan:    Problem List Items Addressed This Visit        Other    Adjustment disorder - Primary    Schizoaffective disorder, bipolar type ([facility] [address] )        Session time [id number] (total time 46 minutes)       Reason for visit is No chief complaint on file  Encounter provider OFE Verma    Provider located at [address] [place] [address] [address]  [place] [address] [id number]      Recent Visits  No visits were found meeting these conditions  Showing recent visits within past 7 days and meeting all other requirements     Future Appointments  No visits were found meeting these conditions  Showing future appointments within next 150 days and meeting all other requirements        The patient was identified by name and date of birth  Susan Tovar was informed that this is a telemedicine visit and that the visit is being conducted through [employer].  My office door was closed  No one else was in the room  She acknowledged consent and understanding of privacy and security of the video platform  The patient has agreed to participate and understands they can discontinue the visit at any time  Patient is aware this is a billable service  Subjective  Susan Tovar is a 30 y o  female presenting for follow up  Geri shared that she has been very busy with school, and has been very tired the past two weeks, as she has been getting up at 5 am to be at school before 7  The first few days, she was waking up at 4 because of anxiety about school, but that has since settled down  She has made 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, one being a man that she is interested in dating  Discussed her concerns about dating a colleague, as well as her loneliness and desire to be in a relationship presently, and how she can approach this friend to develop their relationship    Overall, Kerri Watson shared that she is doing well, no longer worried about the other  that was initially giving her problems with the curriculum  They have talked, and Malka Chavez has figured out more about that teacher's situation which gives her a bit more insight into why she was acting the way she did toward Malka Chavez  She is now able to let that go and move on  A: Malka Chavez presented as very fatigued today but her mood was euthymic and affect bright compared to previous sessions  She seems more settled into her job and appears to be less anxious and worried about stressors  P: Malka Chavez will continue to focus on positives both at work and in her personal life, and will work on increasing social interactions to help manage loneliness and confidence  Psychiatric: calm and cooperative with good eye contact; mood euthymic, affect bright; thought process logical and organized; concentration intact; behavior and speech normal; good insight and judgment; no overt delusions, denies hallucinations; denies SI and HI      HPI     Past Medical History:   Diagnosis Date    Acid reflux     Schizoaffective disorder (HCC)        No past surgical history on file  Current Outpatient Medications   Medication Sig Dispense Refill    amLODIPine (NORVASC) 5 mg tablet       ARIPiprazole (ABILIFY) 10 mg tablet Take 1/2 tab (5mg) PO daily 30 tablet 1    hydrochlorothiazide (HYDRODIURIL) 12 5 mg tablet       loratadine (CLARITIN) 10 mg tablet       Multiple Vitamin (multivitamin) tablet Take 1 tablet by mouth      norgestimate-ethinyl estradiol (ORTHO TRI-CYCLEN LO) 0 18/0 215/0 25 MG-25 MCG per tablet Take 1 tablet by mouth daily      omeprazole (PriLOSEC) 40 MG capsule        No current facility-administered medications for this visit  Allergies   Allergen Reactions    Other      Seasonal allergy         Review of Systems    Video Exam    There were no vitals filed for this visit      Physical Exam     I spent 46 minutes directly with the patient during this visit      VIRTUAL VISIT DISCLAIMER    Jesikadanilo John acknowledges that she has consented to an online visit or consultation  She understands that the online visit is based solely on information provided by her, and that, in the absence of a face-to-face physical evaluation by the physician, the diagnosis she receives is both limited and provisional in terms of accuracy and completeness  This is not intended to replace a full medical face-to-face evaluation by the physician  Jesika John understands and accepts these terms

## 2020-09-30 ENCOUNTER — TELEMEDICINE (OUTPATIENT)
Dept: PSYCHIATRY | Facility: CLINIC | Age: 25
End: 2020-09-30
Payer: COMMERCIAL

## 2020-09-30 VITALS — BODY MASS INDEX: 24.35 KG/M2 | WEIGHT: 124 LBS | HEIGHT: 60 IN

## 2020-09-30 DIAGNOSIS — I10 BENIGN ESSENTIAL HYPERTENSION: ICD-10-CM

## 2020-09-30 DIAGNOSIS — F25.0 SCHIZOAFFECTIVE DISORDER, BIPOLAR TYPE (HCC): ICD-10-CM

## 2020-09-30 DIAGNOSIS — F43.20 ADJUSTMENT DISORDER, UNSPECIFIED TYPE: ICD-10-CM

## 2020-09-30 PROCEDURE — 99213 OFFICE O/P EST LOW 20 MIN: CPT | Performed by: NURSE PRACTITIONER

## 2020-09-30 NOTE — PSYCH
Virtual Regular Visit    Name and Date of Birth:  Charlie Randolph 30 y o  1995 MRN: [id number]    Date of Visit: September 30, 2020    Assessment/Plan:    Problem List Items Addressed This Visit        Cardiovascular and Mediastinum    Benign essential hypertension       Other    Adjustment disorder    Schizoaffective disorder, bipolar type [facility])        Reason for visit is   Chief Complaint   Patient presents with    Virtual Regular Visit    Follow-up    Psychosis    Sleeping Problem      Encounter provider Mireya Salazar, [address]    Provider located at [address]  [id number] [place] [id number]      Recent Visits  No visits were found meeting these conditions  Showing recent visits within past 7 days and meeting all other requirements     Today's Visits  Date Type Provider Dept   09/30/20 [address] today's visits and meeting all other requirements     Future Appointments  No visits were found meeting these conditions  Showing future appointments within next 150 days and meeting all other requirements        The patient was identified by name and date of birth  Charlie Randolph was informed that this is a telemedicine visit and that the visit is being conducted through wumo  My office door was closed  No one else was in the room  She acknowledged consent and understanding of privacy and security of the video platform  The patient has agreed to participate and understands they can discontinue the visit at any time  Patient is aware this is a billable service  Past Medical History:   Diagnosis Date    Acid reflux     Schizoaffective disorder ([facility] [address] )        No past surgical history on file      Current Outpatient Medications   Medication Sig Dispense Refill    amLODIPine (NORVASC) 5 mg tablet       ARIPiprazole (ABILIFY) 10 mg tablet Take 1/2 tab (5mg) PO daily 30 tablet 1    hydrochlorothiazide (HYDRODIURIL) 12 5 mg tablet       loratadine (CLARITIN) 10 mg tablet       Multiple Vitamin (multivitamin) tablet Take 1 tablet by mouth      norgestimate-ethinyl estradiol (ORTHO TRI-CYCLEN LO) 0 18/0 215/0 25 MG-25 MCG per tablet Take 1 tablet by mouth daily      omeprazole (PriLOSEC) 40 MG capsule        No current facility-administered medications for this visit  Allergies   Allergen Reactions    Other      Seasonal allergy             Vitals:    09/30/20 1416   Weight: 56 2 kg (124 lb)   Height: 5' (1 524 m)       VIRTUAL VISIT DISCLAIMER    Keanu Love acknowledges that she has consented to an online visit or consultation  She understands that the online visit is based solely on information provided by her, and that, in the absence of a face-to-face physical evaluation by the physician, the diagnosis she receives is both limited and provisional in terms of accuracy and completeness  This is not intended to replace a full medical face-to-face evaluation by the physician  Keanu Love understands and accepts these terms  SUBJECTIVE:    Andres Rayo is seen today for a follow up for Schizoaffective bipolar type, adjustment disorder, sleep disturbance  Since our last visit, overall symptoms have been "good overall, still very busy with school but I have been good "  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, she has not had any issues with hallucinations or paranoid ideation  She states she has  not been feeling symptoms of depression or anxiety, she denies any auditory visual hallucinations, she states she has been taking her medications as prescribed and denies side effects        HPI ROS:   Medication Side Effects:  Denies  Depression:  0 /10 (10 worst)  Anxiety:  0 /10 (10 worst)  Safety concerns (SI, HI, others):  Denies  Hallucinations(A/V/T/O)/Delusion:  Denies  Sleep:  6 hours per night denies nightmares  Energy: Good energy and motivation however tired at times due to teaching a lot of hours and working a lot  Appetite:  Good appetite  Weight Change: 5 ft 0 in, 124 lbs (some wt loss but has been trying)    Geri denies any side effects from medications unless noted above    Review Of Systems:      Constitutional negative   ENT negative   Cardiovascular negative   Respiratory negative   Gastrointestinal negative   Genitourinary negative   Musculoskeletal negative   Integumentary negative   Neurological negative   Endocrine negative   Other Symptoms none, all other systems are negative     History Review: The following portions of the patient's history were reviewed and documented: allergies, current medications, past family history, past medical history, past social history and problem list      Lab Review: No new labs or no relevant labs needing review with patient today  Patient reminded to have her labs drawn which were ordered August 26, 2020        Video Exam    OBJECTIVE:     Vital signs in last 24 hours:    Vitals:    09/30/20 1416   Weight: 56 2 kg (124 lb)   Height: 5' (1 524 m)       Mental Status Evaluation:    Appearance age appropriate, casually dressed   Behavior cooperative, calm, good eye contact   Speech normal rate, normal volume, normal pitch   Mood euthymic   Affect normal range and intensity, appropriate   Thought Processes organized, goal directed, linear   Associations intact associations   Thought Content no overt delusions   Perceptual Disturbances: no auditory hallucinations, no visual hallucinations   Abnormal Thoughts  Risk Potential Suicidal ideation - None  Homicidal ideation - None  Potential for aggression - No   Orientation oriented to person, place, time/date and situation   Memory recent and remote memory grossly intact   Consciousness alert and awake   Attention Span Concentration Span attention span and concentration are age appropriate   Intellect appears to be of average intelligence   Insight intact   Judgement intact   Muscle Strength and  Gait unable to assess today due to virtual visit   Motor activity unable to assess today due to virtual visit   Language no difficulty naming common objects, no difficulty repeating a phrase, unable to assess writing today due to virtual visit   Fund of Knowledge adequate knowledge of current events  adequate fund of knowledge regarding past history  adequate fund of knowledge regarding vocabulary    Pain none   Pain Scale 0       Risks, Benefits And Possible Side Effects Of Medications:    AGREE: Risks, benefits, and possible side effects of medications explained to [facility] and she (or legal representative) verbalizes understanding and agreement for treatment  PREGNANCY: Risks related to Pregnancy or becoming pregnant discussed related to medications and treatment  Patient has agreed to discuss treatment if planning to become pregnant, or if they become pregnant  Patient reports she is not currently sexually active  Controlled Medication Discussion:     Not applicable  ______________________________________________________________  Past Psychiatric History, Social History, Family Psychiatric History, Substance Abuse History, and Traumatic History copied from my note, [facility], dated 08/26/2020  Past Psychiatric History:      Past Inpatient Psychiatric Treatment:   One past inpatient psychiatric admission at [facility] in 2011 during 1st year of college while in [facility]  Admitted for paranoia and bizzarre behavior of shredding paper with her fingers (she does not have a clear memory of what happened)  Past Outpatient Psychiatric Treatment:    2011 Therapist Roberto Carlos Gaston and after Yuni Ching left Atiya Mathur in [facility]) & psychiatrist Dr Christine Rahman 2011;  Radha at [address] 2019 and now Yuliet Blanco at [facility]  Past Suicide Attempts: no  Past Violent Behavior: no  Past Psychiatric Medication Trials: Risperdal and Abilify     Traumatic History:    Abuse: no history of physical or sexual abuse, positive history of physical abuse, no history of emotional abuse, no history of verbal abuse  Other Traumatic Events: none      Family Psychiatric History:      Suicide Attempts or Completions: denies  Substance Abuse: denies  Denies family history of psychiatric illness     Substance Use History:     Cigarettes:  Never Used  Vap/E Cigarettes: Never  Smokeless Tobacco: Never  Alcohol: None (wedding a sip)  Illicit Substances: Never  Caffeine: No coffee, soda 1 8 oz q 2 weeks    Social History:  Education level: in [employer] at [employer] (Upper sorbian and Social Studies)  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)  Marital status: never   Children: none  Current Living Situation: the patient currently lives in a house with roommates (rent)   Social support: mom Nessa Escobar and oldest brother Carly Sapp: Yossi Ma experience: None  Legal history: Denies  Access to [employer]: Denies     Past Medical History:     Concussion:  denies  Seizures: denies     Past Medical History:    Past Medical History:   Diagnosis Date    Acid reflux     [facility]      Past Medical History Pertinent Negatives:   Diagnosis Date Noted    Disease of thyroid gland 08/26/2020    Head injury 08/26/2020    Seizures ([facility] [address] ) 08/26/2020     No past surgical history on file  Allergies   Allergen Reactions    Other      Seasonal allergy         Substance Abuse History:    Social History     Substance and Sexual Activity   Alcohol Use Not Currently    Comment: sip at a wedding     Social History     Substance and Sexual Activity   Drug Use Never       Social History:    Social History     Socioeconomic History    Marital status: Single     Spouse name: Not on file    Number of children: 0    Years of education: Not on file    Highest education level:  Bachelor's degree (rudolph felder , BA, AB, BS)   Occupational History    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[employer] [id number] resource strain: Not on file    Food insecurity     Worry: Not on file     Inability: Not on file   [employer] needs     Medical: Not on file     Non-medical: Not on file   Tobacco Use    Smoking status: Never Smoker    Smokeless tobacco: Never Used   Substance and Sexual Activity    Alcohol use: Not Currently     Comment: sip at a wedding    Drug use: Never    Sexual activity: Not Currently     Birth control/protection: OCP   Lifestyle    Physical activity     Days per week: Not on file     Minutes per session: Not on file    Stress: Not on file   Relationships    Social connections     Talks on phone: Not on file     Gets together: Not on file     Attends Caodaism service: Not on file     Active member of club or organization: Not on file     Attends meetings of clubs or organizations: Not on file     Relationship status: Not on file    Intimate partner violence     Fear of current or ex partner: No     Emotionally abused: No     Physically abused: No     Forced sexual activity: No   Other Topics Concern    Not on file   Social History Narrative    Not on file       Family Psychiatric History:     Family History   Problem Relation Age of Onset    Hypertension Mother     No Known Problems Brother     Alzheimer's disease Maternal Grandfather     Hypertension Maternal Grandmother     Dialysis Maternal Grandmother     No Known Problems Brother     No Known Problems Brother        ____________________________________________________________________    Confidential Assessment:    Confidential assessment copied from my note, Jv Cruz, dated 08/26/2020  Geri this 43-year-old female who was born and raised until the age of 8 or 7 in [facility] by her mother and biological father who passed away from an unknown disease    She states her mother does not discuss it and she states [place], he got sick and they were not able to save him my mom does not talk about it    She reports that her mother moved to the [facility] to live with her mother and sister(patient's grandmother an aunt) and patient and her older brother and 2 younger brothers were left behind in [place] with their mother's best friend  The 3 children were eventually brought to the [facility] 1 at a time as their mother could afford it  She denies any abuse growing up  She reports meeting all milestones and she was in honors classes in high school and graduated on time  She states that she did not have a lot of friends in general as she was quiet however she loved her studies      She states in her senior year of high school she joined [employer] and track  She states this is when she started to have decreased sleep  She states that she was in honors courses, she was very involved in the to clubs as well as she loved reading and she would stay up at night focusing on Anime books, [employer], comic, videos etc she states she was stable on the computer for hours and she was getting approximately 2 hours of sleep per night      Patient states that she started falling asleep in class toward the end of the year and 1 of her teachers approach her about this was going to get her mental health help however she graduated before this occurred  She states that her behaviors of staying up all night on the computer and having obsessive thoughts about Anime and Manga and the fantasy world continued over the summer she continued only have approximately 2 hours of sleep per night  She was a commuter student back and forth to [employer]  She states that she never miss classes, she was a biology major and she was attempting to memorize information in her biology books word for word however she was unable to master this  She reports the stress of this along with her lack of sleep was too much    She states her mother was attempting to obtain a counselor for her as she was very stressed out      Geri states 1 day (2011, Freshman year 1st semester at [employer]) she did not go to school and she refers to this as the [employer] "  She states, "I was just sitting there shredding paper with my hands for 8 hours straight, my family was trying to help me and I was screaming at them and threatening them and I would not let them help me and they tried to reach a counselor and they finally had a call to have me taken away    She reports this is her only inpatient admission to [facility]  She reports at this time she was extremely paranoid and she believes she was having auditory hallucinations at this time as well however she cannot recall much from this "episode "       Since 2011 she has been under the care of therapists and Psychiatry however this provider does not have documents or records to review  Patient states she was initiated on Risperdal however she did not like how she felt on this and she did not feel that it worked  She was only on for approximately 2 weeks and then discontinued and initiated on Abilify  She states the highest dose of Abilify was 25 mg which was maintained for some time  She denies any side effects to Abilify however she has been slowly titrated down over the years and she has been on approximately 5 mg for the last 2 years without issue and she has not had significant paranoia for the last 1 and half-2 years     She was given a diagnosis of schizoaffective bipolar type  At the time in 2011 it sounds as though she may have met the criteria as she was staying up for days on and she reports having at least a minimum of 72 hours of being wake and not missing her sleep, she reports being hyper, being distracted being irritable having appetite changes, having psychomotor agitation and having significant increased goal-directed activity    Of note she states this did build up over time in the majority of these behaviors occurred after her significant lack of sleep (meaning the irritability, agitation, psychomotor agitation, appetite changes)  She reports her increased goal-directed activity or her obsessive thoughts about the Anime and comics are what led to her lack of sleep  She then experienced psychosis while she was up for approximately 72 hours as well as significant irritability and agitation during that same period of time  After the episode she reports having significant difficulty with self-care which included challenges with showering, brushing her teeth, feeling down, decreased appetite  She reports Abilify helped all of these symptoms and she has been well since that time  She continues in psychotherapy  This provider feels as though she did have obsessive behaviors and this should be monitored        08/26/2020:patient gave this provider verbal release of information to review records from [facility], [facility] and [facility] in other words all records in the [address] [place] [address] to review labs, records and EKGs  This provider was only able to find labs from 2011 and patient will need to have new labs obtained  Scales:  No new scales today       Assessment/Plan:       Diagnoses and all orders for this visit:    Adjustment disorder, unspecified type    Benign essential hypertension    Schizoaffective disorder, bipolar type ([facility] [address] )          Treatment Recommendations/Precautions/Plan:    Geri reports feeling quite well since last visit  She states that she has been not having any feelings of depression, anxiety, racing thoughts, paranoia, jonnathan, sleep issues since last visit  She states that she has been sleeping at least 6 hours per night, she denies nightmares, she denies any feelings of irritability or agitation, she denies any hallucinations  She reports her energy and motivation as good overall    She continues to take Abilify 5 milligrams p o  Daily without side effects  She states that she feels this medication continues to help her with symptoms of depression as well as maintaining her symptoms of irritability as well as her prior symptoms of psychosis however this has been long resolved  She states she continues to maintain a healthy diet she did lose approximately 6 pounds however she states that she was attempting to do so by improving her diet and exercise  She denies any symptoms of concerns related to eating disorder or restricting  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, she states that she has not been having any OCD type symptoms at this time and she has not felt like she has been fixated on any specific topics or projects etc   She has been caring for herself appropriately, showering brushing her teeth washing her clothing etc       Patient has been educated about their diagnosis and treatment modalities  They voiced understanding and agreement with the following plan:    -patient was reminded to have her lab work drawn as she is on antipsychotic medication and this provider ordered labs at initial visit on 08/26/2020  Still pending include CBC, CMP, lipid panel, TSH, EKG and hemoglobin A1c  Patient denies being sexually active at this time so pregnancy test was not ordered and patient is on birth control     -continue Abilify 5 milligrams p o  Daily to maintain symptoms of schizoaffective bipolar type, depressive symptoms of schizoaffective disorder and mood stabilization  Patient has 60 day supply +1 refill available at pharmacy, no prescription required to be sent from this provider this time     -continued psychotherapy with Karen sutton at [facility]    -followup with this provider on December 17, 2020 at 3 p m      Continue to follow with primary care physician for routine medical care, routine yearly lab work and any other medical concerns     -Patient will call if issues or concerns     -Discussed self monitoring of symptoms, and symptom monitoring tools     -Patient has been informed of 24 hours and weekend coverage for urgent situations accessed by calling the main clinic phone number      [facility] Crisis Telephone Numbers and the National Suicide Prevention Hotline Number Provided to Patient     -Treatment Plan with Claude sutton psychotherapist at [facility]    Psychotherapy Provided:     Individual psychotherapy provided: Yes  Counseling was provided during the session today for 16 minutes  Medications, treatment progress and treatment plan reviewed with Geri  Medication education provided to [facility]  Recent stressor including COVID-19 issues, job stress, school stress, occasional anxiety and Need to have lab work drawn discussed with [facility]  Coping skills reviewed with Geri  Importance of follow up with family physician for medical issues reviewed with Geri  Reassurance and supportive therapy provided  Crisis/safety plan discussed with Geri       I spent 30 minutes directly with the patient during this visit    Stacy Euceda 09/30/20

## 2020-10-01 ENCOUNTER — TELEPHONE (OUTPATIENT)
Dept: PSYCHIATRY | Facility: CLINIC | Age: 25
End: 2020-10-01

## 2020-10-07 ENCOUNTER — TELEPHONE (OUTPATIENT)
Dept: OTHER | Facility: OTHER | Age: 25
End: 2020-10-07

## 2020-11-15 ENCOUNTER — APPOINTMENT (EMERGENCY)
Dept: RADIOLOGY | Facility: HOSPITAL | Age: 25
DRG: 263 | End: 2020-11-15
Payer: COMMERCIAL

## 2020-11-15 ENCOUNTER — HOSPITAL ENCOUNTER (INPATIENT)
Facility: HOSPITAL | Age: 25
LOS: 3 days | Discharge: HOME | DRG: 263 | End: 2020-11-18
Attending: EMERGENCY MEDICINE | Admitting: SURGERY
Payer: COMMERCIAL

## 2020-11-15 DIAGNOSIS — K80.50 CHOLEDOCHOLITHIASIS: Primary | ICD-10-CM

## 2020-11-15 DIAGNOSIS — R10.84 GENERALIZED ABDOMINAL PAIN: ICD-10-CM

## 2020-11-15 DIAGNOSIS — K80.20 GALLSTONES: ICD-10-CM

## 2020-11-15 DIAGNOSIS — R11.2 NAUSEA AND VOMITING: ICD-10-CM

## 2020-11-15 LAB
ALBUMIN SERPL BCP-MCNC: 3.5 G/DL (ref 3.5–5)
ALP SERPL-CCNC: 132 U/L (ref 46–116)
ALT SERPL W P-5'-P-CCNC: 198 U/L (ref 12–78)
ANION GAP SERPL CALCULATED.3IONS-SCNC: 6 MMOL/L (ref 4–13)
AST SERPL W P-5'-P-CCNC: 210 U/L (ref 5–45)
BACTERIA UR QL AUTO: ABNORMAL /HPF
BASOPHILS # BLD AUTO: 0.05 THOUSANDS/ΜL (ref 0–0.1)
BASOPHILS NFR BLD AUTO: 1 % (ref 0–1)
BILIRUB SERPL-MCNC: 1.81 MG/DL (ref 0.2–1)
BILIRUB UR QL STRIP: ABNORMAL
BUN SERPL-MCNC: 13 MG/DL (ref 5–25)
CALCIUM SERPL-MCNC: 9.2 MG/DL (ref 8.3–10.1)
CHLORIDE SERPL-SCNC: 106 MMOL/L (ref 100–108)
CLARITY UR: CLEAR
CO2 SERPL-SCNC: 28 MMOL/L (ref 21–32)
COLOR UR: ABNORMAL
CREAT SERPL-MCNC: 0.89 MG/DL (ref 0.6–1.3)
EOSINOPHIL # BLD AUTO: 0.12 THOUSAND/ΜL (ref 0–0.61)
EOSINOPHIL NFR BLD AUTO: 1 % (ref 0–6)
ERYTHROCYTE [DISTWIDTH] IN BLOOD BY AUTOMATED COUNT: 14 % (ref 11.6–15.1)
EXT PREG TEST URINE: NEGATIVE
EXT. CONTROL ED NAV: NORMAL
FLUAV RNA RESP QL NAA+PROBE: NEGATIVE
FLUBV RNA RESP QL NAA+PROBE: NEGATIVE
GFR SERPL CREATININE-BSD FRML MDRD: 103 ML/MIN/1.73SQ M
GLUCOSE SERPL-MCNC: 77 MG/DL (ref 65–140)
GLUCOSE UR STRIP-MCNC: NEGATIVE MG/DL
HCT VFR BLD AUTO: 38.7 % (ref 34.8–46.1)
HGB BLD-MCNC: 12.9 G/DL (ref 11.5–15.4)
HGB UR QL STRIP.AUTO: NEGATIVE
IMM GRANULOCYTES # BLD AUTO: 0.03 THOUSAND/UL (ref 0–0.2)
IMM GRANULOCYTES NFR BLD AUTO: 0 % (ref 0–2)
KETONES UR STRIP-MCNC: ABNORMAL MG/DL
LEUKOCYTE ESTERASE UR QL STRIP: NEGATIVE
LIPASE SERPL-CCNC: 262 U/L (ref 73–393)
LYMPHOCYTES # BLD AUTO: 3.64 THOUSANDS/ΜL (ref 0.6–4.47)
LYMPHOCYTES NFR BLD AUTO: 34 % (ref 14–44)
MCH RBC QN AUTO: 27.9 PG (ref 26.8–34.3)
MCHC RBC AUTO-ENTMCNC: 33.3 G/DL (ref 31.4–37.4)
MCV RBC AUTO: 84 FL (ref 82–98)
MONOCYTES # BLD AUTO: 0.84 THOUSAND/ΜL (ref 0.17–1.22)
MONOCYTES NFR BLD AUTO: 8 % (ref 4–12)
MUCOUS THREADS UR QL AUTO: ABNORMAL
NEUTROPHILS # BLD AUTO: 6.2 THOUSANDS/ΜL (ref 1.85–7.62)
NEUTS SEG NFR BLD AUTO: 56 % (ref 43–75)
NITRITE UR QL STRIP: NEGATIVE
NON-SQ EPI CELLS URNS QL MICRO: ABNORMAL /HPF
NRBC BLD AUTO-RTO: 0 /100 WBCS
PH UR STRIP.AUTO: 7 [PH] (ref 4.5–8)
PLATELET # BLD AUTO: 439 THOUSANDS/UL (ref 149–390)
PMV BLD AUTO: 10.6 FL (ref 8.9–12.7)
POTASSIUM SERPL-SCNC: 3.9 MMOL/L (ref 3.5–5.3)
PROT SERPL-MCNC: 7.6 G/DL (ref 6.4–8.2)
PROT UR STRIP-MCNC: ABNORMAL MG/DL
RBC # BLD AUTO: 4.63 MILLION/UL (ref 3.81–5.12)
RBC #/AREA URNS AUTO: ABNORMAL /HPF
RSV RNA RESP QL NAA+PROBE: NEGATIVE
SARS-COV-2 RNA RESP QL NAA+PROBE: NEGATIVE
SODIUM SERPL-SCNC: 140 MMOL/L (ref 136–145)
SP GR UR STRIP.AUTO: 1.02 (ref 1–1.03)
UROBILINOGEN UR QL STRIP.AUTO: >=8 E.U./DL
WBC # BLD AUTO: 10.88 THOUSAND/UL (ref 4.31–10.16)
WBC #/AREA URNS AUTO: ABNORMAL /HPF

## 2020-11-15 PROCEDURE — 80053 COMPREHEN METABOLIC PANEL: CPT | Performed by: EMERGENCY MEDICINE

## 2020-11-15 PROCEDURE — G1004 CDSM NDSC: HCPCS

## 2020-11-15 PROCEDURE — 85025 COMPLETE CBC W/AUTO DIFF WBC: CPT | Performed by: EMERGENCY MEDICINE

## 2020-11-15 PROCEDURE — 99253 IP/OBS CNSLTJ NEW/EST LOW 45: CPT | Performed by: SURGERY

## 2020-11-15 PROCEDURE — 76705 ECHO EXAM OF ABDOMEN: CPT

## 2020-11-15 PROCEDURE — 99285 EMERGENCY DEPT VISIT HI MDM: CPT | Performed by: EMERGENCY MEDICINE

## 2020-11-15 PROCEDURE — 96374 THER/PROPH/DIAG INJ IV PUSH: CPT

## 2020-11-15 PROCEDURE — NC001 PR NO CHARGE: Performed by: SURGERY

## 2020-11-15 PROCEDURE — 74177 CT ABD & PELVIS W/CONTRAST: CPT

## 2020-11-15 PROCEDURE — 87040 BLOOD CULTURE FOR BACTERIA: CPT | Performed by: INTERNAL MEDICINE

## 2020-11-15 PROCEDURE — 81001 URINALYSIS AUTO W/SCOPE: CPT

## 2020-11-15 PROCEDURE — 99285 EMERGENCY DEPT VISIT HI MDM: CPT

## 2020-11-15 PROCEDURE — 0241U HB NFCT DS VIR RESP RNA 4 TRGT: CPT | Performed by: INTERNAL MEDICINE

## 2020-11-15 PROCEDURE — 96361 HYDRATE IV INFUSION ADD-ON: CPT

## 2020-11-15 PROCEDURE — 83690 ASSAY OF LIPASE: CPT | Performed by: EMERGENCY MEDICINE

## 2020-11-15 PROCEDURE — 99223 1ST HOSP IP/OBS HIGH 75: CPT | Performed by: INTERNAL MEDICINE

## 2020-11-15 PROCEDURE — 81025 URINE PREGNANCY TEST: CPT | Performed by: EMERGENCY MEDICINE

## 2020-11-15 PROCEDURE — 36415 COLL VENOUS BLD VENIPUNCTURE: CPT | Performed by: EMERGENCY MEDICINE

## 2020-11-15 RX ORDER — KETOROLAC TROMETHAMINE 30 MG/ML
15 INJECTION, SOLUTION INTRAMUSCULAR; INTRAVENOUS ONCE
Status: COMPLETED | OUTPATIENT
Start: 2020-11-15 | End: 2020-11-15

## 2020-11-15 RX ORDER — ACETAMINOPHEN 325 MG/1
650 TABLET ORAL EVERY 6 HOURS PRN
Status: DISCONTINUED | OUTPATIENT
Start: 2020-11-15 | End: 2020-11-18 | Stop reason: HOSPADM

## 2020-11-15 RX ORDER — POLYETHYLENE GLYCOL 3350 17 G/17G
17 POWDER, FOR SOLUTION ORAL DAILY
Status: DISCONTINUED | OUTPATIENT
Start: 2020-11-15 | End: 2020-11-18 | Stop reason: HOSPADM

## 2020-11-15 RX ORDER — LIDOCAINE HYDROCHLORIDE 20 MG/ML
15 SOLUTION OROPHARYNGEAL ONCE
Status: COMPLETED | OUTPATIENT
Start: 2020-11-15 | End: 2020-11-15

## 2020-11-15 RX ORDER — SODIUM CHLORIDE, SODIUM LACTATE, POTASSIUM CHLORIDE, CALCIUM CHLORIDE 600; 310; 30; 20 MG/100ML; MG/100ML; MG/100ML; MG/100ML
100 INJECTION, SOLUTION INTRAVENOUS CONTINUOUS
Status: DISCONTINUED | OUTPATIENT
Start: 2020-11-15 | End: 2020-11-15

## 2020-11-15 RX ORDER — ONDANSETRON 2 MG/ML
4 INJECTION INTRAMUSCULAR; INTRAVENOUS EVERY 6 HOURS PRN
Status: DISCONTINUED | OUTPATIENT
Start: 2020-11-15 | End: 2020-11-18 | Stop reason: HOSPADM

## 2020-11-15 RX ORDER — CEFAZOLIN SODIUM 1 G/50ML
1000 SOLUTION INTRAVENOUS EVERY 8 HOURS
Status: DISCONTINUED | OUTPATIENT
Start: 2020-11-15 | End: 2020-11-17

## 2020-11-15 RX ORDER — MAGNESIUM HYDROXIDE/ALUMINUM HYDROXICE/SIMETHICONE 120; 1200; 1200 MG/30ML; MG/30ML; MG/30ML
30 SUSPENSION ORAL ONCE
Status: COMPLETED | OUTPATIENT
Start: 2020-11-15 | End: 2020-11-15

## 2020-11-15 RX ORDER — SODIUM CHLORIDE, SODIUM LACTATE, POTASSIUM CHLORIDE, CALCIUM CHLORIDE 600; 310; 30; 20 MG/100ML; MG/100ML; MG/100ML; MG/100ML
100 INJECTION, SOLUTION INTRAVENOUS CONTINUOUS
Status: DISCONTINUED | OUTPATIENT
Start: 2020-11-16 | End: 2020-11-17

## 2020-11-15 RX ORDER — ONDANSETRON 2 MG/ML
4 INJECTION INTRAMUSCULAR; INTRAVENOUS ONCE
Status: COMPLETED | OUTPATIENT
Start: 2020-11-15 | End: 2020-11-15

## 2020-11-15 RX ORDER — HYDROMORPHONE HCL/PF 1 MG/ML
0.2 SYRINGE (ML) INJECTION
Status: DISCONTINUED | OUTPATIENT
Start: 2020-11-15 | End: 2020-11-18 | Stop reason: HOSPADM

## 2020-11-15 RX ORDER — PANTOPRAZOLE SODIUM 40 MG/1
40 TABLET, DELAYED RELEASE ORAL
Status: DISCONTINUED | OUTPATIENT
Start: 2020-11-15 | End: 2020-11-18 | Stop reason: HOSPADM

## 2020-11-15 RX ORDER — HYDROMORPHONE HCL/PF 1 MG/ML
0.5 SYRINGE (ML) INJECTION
Status: DISCONTINUED | OUTPATIENT
Start: 2020-11-15 | End: 2020-11-18 | Stop reason: HOSPADM

## 2020-11-15 RX ADMIN — ONDANSETRON 4 MG: 2 INJECTION INTRAMUSCULAR; INTRAVENOUS at 06:20

## 2020-11-15 RX ADMIN — CEFAZOLIN SODIUM 1000 MG: 1 SOLUTION INTRAVENOUS at 20:34

## 2020-11-15 RX ADMIN — ENOXAPARIN SODIUM 40 MG: 40 INJECTION SUBCUTANEOUS at 18:17

## 2020-11-15 RX ADMIN — KETOROLAC TROMETHAMINE 15 MG: 30 INJECTION, SOLUTION INTRAMUSCULAR at 11:03

## 2020-11-15 RX ADMIN — SODIUM CHLORIDE 1000 ML: 0.9 INJECTION, SOLUTION INTRAVENOUS at 06:22

## 2020-11-15 RX ADMIN — ONDANSETRON 4 MG: 2 INJECTION INTRAMUSCULAR; INTRAVENOUS at 18:19

## 2020-11-15 RX ADMIN — PANTOPRAZOLE SODIUM 40 MG: 40 TABLET, DELAYED RELEASE ORAL at 13:35

## 2020-11-15 RX ADMIN — CEFAZOLIN SODIUM 1000 MG: 1 SOLUTION INTRAVENOUS at 11:50

## 2020-11-15 RX ADMIN — ENOXAPARIN SODIUM 40 MG: 40 INJECTION SUBCUTANEOUS at 11:03

## 2020-11-15 RX ADMIN — SODIUM CHLORIDE, SODIUM LACTATE, POTASSIUM CHLORIDE, AND CALCIUM CHLORIDE 100 ML/HR: .6; .31; .03; .02 INJECTION, SOLUTION INTRAVENOUS at 11:03

## 2020-11-15 RX ADMIN — IOHEXOL 80 ML: 350 INJECTION, SOLUTION INTRAVENOUS at 07:23

## 2020-11-15 RX ADMIN — ALUMINUM HYDROXIDE, MAGNESIUM HYDROXIDE, AND SIMETHICONE 30 ML: 200; 200; 20 SUSPENSION ORAL at 06:20

## 2020-11-15 RX ADMIN — METRONIDAZOLE 500 MG: 500 INJECTION, SOLUTION INTRAVENOUS at 22:07

## 2020-11-15 RX ADMIN — POLYETHYLENE GLYCOL 3350 17 G: 17 POWDER, FOR SOLUTION ORAL at 13:35

## 2020-11-15 RX ADMIN — LIDOCAINE HYDROCHLORIDE 15 ML: 20 SOLUTION ORAL; TOPICAL at 06:20

## 2020-11-15 RX ADMIN — METRONIDAZOLE 500 MG: 500 INJECTION, SOLUTION INTRAVENOUS at 12:08

## 2020-11-16 ENCOUNTER — APPOINTMENT (INPATIENT)
Dept: GASTROENTEROLOGY | Facility: HOSPITAL | Age: 25
DRG: 263 | End: 2020-11-16
Payer: COMMERCIAL

## 2020-11-16 ENCOUNTER — ANESTHESIA EVENT (INPATIENT)
Dept: GASTROENTEROLOGY | Facility: HOSPITAL | Age: 25
DRG: 263 | End: 2020-11-16
Payer: COMMERCIAL

## 2020-11-16 ENCOUNTER — ANESTHESIA (INPATIENT)
Dept: GASTROENTEROLOGY | Facility: HOSPITAL | Age: 25
DRG: 263 | End: 2020-11-16
Payer: COMMERCIAL

## 2020-11-16 ENCOUNTER — APPOINTMENT (INPATIENT)
Dept: RADIOLOGY | Facility: HOSPITAL | Age: 25
DRG: 263 | End: 2020-11-16
Payer: COMMERCIAL

## 2020-11-16 VITALS — HEART RATE: 120 BPM

## 2020-11-16 PROBLEM — K80.50 CHOLEDOCHOLITHIASIS: Status: ACTIVE | Noted: 2020-11-16

## 2020-11-16 LAB
ALBUMIN SERPL BCP-MCNC: 2.8 G/DL (ref 3.5–5)
ALP SERPL-CCNC: 107 U/L (ref 46–116)
ALT SERPL W P-5'-P-CCNC: 161 U/L (ref 12–78)
ANION GAP SERPL CALCULATED.3IONS-SCNC: 6 MMOL/L (ref 4–13)
AST SERPL W P-5'-P-CCNC: 81 U/L (ref 5–45)
BILIRUB SERPL-MCNC: 0.48 MG/DL (ref 0.2–1)
BUN SERPL-MCNC: 6 MG/DL (ref 5–25)
CALCIUM ALBUM COR SERPL-MCNC: 9.4 MG/DL (ref 8.3–10.1)
CALCIUM SERPL-MCNC: 8.4 MG/DL (ref 8.3–10.1)
CHLORIDE SERPL-SCNC: 111 MMOL/L (ref 100–108)
CO2 SERPL-SCNC: 26 MMOL/L (ref 21–32)
CREAT SERPL-MCNC: 0.63 MG/DL (ref 0.6–1.3)
ERYTHROCYTE [DISTWIDTH] IN BLOOD BY AUTOMATED COUNT: 14.5 % (ref 11.6–15.1)
EXT PREGNANCY TEST URINE: NEGATIVE
EXT. CONTROL: NORMAL
GFR SERPL CREATININE-BSD FRML MDRD: 142 ML/MIN/1.73SQ M
GLUCOSE SERPL-MCNC: 82 MG/DL (ref 65–140)
HCT VFR BLD AUTO: 32.1 % (ref 34.8–46.1)
HGB BLD-MCNC: 10.5 G/DL (ref 11.5–15.4)
LIPASE SERPL-CCNC: 252 U/L (ref 73–393)
MAGNESIUM SERPL-MCNC: 2.5 MG/DL (ref 1.6–2.6)
MCH RBC QN AUTO: 27.6 PG (ref 26.8–34.3)
MCHC RBC AUTO-ENTMCNC: 32.7 G/DL (ref 31.4–37.4)
MCV RBC AUTO: 85 FL (ref 82–98)
PHOSPHATE SERPL-MCNC: 2.5 MG/DL (ref 2.7–4.5)
PLATELET # BLD AUTO: 326 THOUSANDS/UL (ref 149–390)
PMV BLD AUTO: 10.5 FL (ref 8.9–12.7)
POTASSIUM SERPL-SCNC: 3.1 MMOL/L (ref 3.5–5.3)
PROT SERPL-MCNC: 5.7 G/DL (ref 6.4–8.2)
RBC # BLD AUTO: 3.8 MILLION/UL (ref 3.81–5.12)
SODIUM SERPL-SCNC: 143 MMOL/L (ref 136–145)
WBC # BLD AUTO: 5.48 THOUSAND/UL (ref 4.31–10.16)

## 2020-11-16 PROCEDURE — C1769 GUIDE WIRE: HCPCS

## 2020-11-16 PROCEDURE — 0F798ZZ DILATION OF COMMON BILE DUCT, VIA NATURAL OR ARTIFICIAL OPENING ENDOSCOPIC: ICD-10-PCS | Performed by: INTERNAL MEDICINE

## 2020-11-16 PROCEDURE — 83690 ASSAY OF LIPASE: CPT | Performed by: SURGERY

## 2020-11-16 PROCEDURE — 85027 COMPLETE CBC AUTOMATED: CPT | Performed by: SURGERY

## 2020-11-16 PROCEDURE — 74328 X-RAY BILE DUCT ENDOSCOPY: CPT

## 2020-11-16 PROCEDURE — 99232 SBSQ HOSP IP/OBS MODERATE 35: CPT | Performed by: SURGERY

## 2020-11-16 PROCEDURE — 43262 ENDO CHOLANGIOPANCREATOGRAPH: CPT | Performed by: INTERNAL MEDICINE

## 2020-11-16 PROCEDURE — 84100 ASSAY OF PHOSPHORUS: CPT | Performed by: SURGERY

## 2020-11-16 PROCEDURE — 93005 ELECTROCARDIOGRAM TRACING: CPT

## 2020-11-16 PROCEDURE — 0FC98ZZ EXTIRPATION OF MATTER FROM COMMON BILE DUCT, VIA NATURAL OR ARTIFICIAL OPENING ENDOSCOPIC: ICD-10-PCS | Performed by: INTERNAL MEDICINE

## 2020-11-16 PROCEDURE — 83735 ASSAY OF MAGNESIUM: CPT | Performed by: SURGERY

## 2020-11-16 PROCEDURE — 43264 ERCP REMOVE DUCT CALCULI: CPT | Performed by: INTERNAL MEDICINE

## 2020-11-16 PROCEDURE — 81025 URINE PREGNANCY TEST: CPT | Performed by: INTERNAL MEDICINE

## 2020-11-16 PROCEDURE — 80053 COMPREHEN METABOLIC PANEL: CPT | Performed by: SURGERY

## 2020-11-16 RX ORDER — DEXAMETHASONE SODIUM PHOSPHATE 10 MG/ML
INJECTION, SOLUTION INTRAMUSCULAR; INTRAVENOUS AS NEEDED
Status: DISCONTINUED | OUTPATIENT
Start: 2020-11-16 | End: 2020-11-16

## 2020-11-16 RX ORDER — FENTANYL CITRATE 50 UG/ML
INJECTION, SOLUTION INTRAMUSCULAR; INTRAVENOUS AS NEEDED
Status: DISCONTINUED | OUTPATIENT
Start: 2020-11-16 | End: 2020-11-16

## 2020-11-16 RX ORDER — ONDANSETRON 2 MG/ML
INJECTION INTRAMUSCULAR; INTRAVENOUS AS NEEDED
Status: DISCONTINUED | OUTPATIENT
Start: 2020-11-16 | End: 2020-11-16

## 2020-11-16 RX ORDER — POTASSIUM CHLORIDE 20MEQ/15ML
40 LIQUID (ML) ORAL ONCE
Status: COMPLETED | OUTPATIENT
Start: 2020-11-16 | End: 2020-11-16

## 2020-11-16 RX ORDER — PROPOFOL 10 MG/ML
INJECTION, EMULSION INTRAVENOUS AS NEEDED
Status: DISCONTINUED | OUTPATIENT
Start: 2020-11-16 | End: 2020-11-16

## 2020-11-16 RX ORDER — SODIUM CHLORIDE, SODIUM LACTATE, POTASSIUM CHLORIDE, CALCIUM CHLORIDE 600; 310; 30; 20 MG/100ML; MG/100ML; MG/100ML; MG/100ML
100 INJECTION, SOLUTION INTRAVENOUS CONTINUOUS
Status: DISCONTINUED | OUTPATIENT
Start: 2020-11-17 | End: 2020-11-17

## 2020-11-16 RX ORDER — ARIPIPRAZOLE 10 MG/1
10 TABLET ORAL DAILY
Status: DISCONTINUED | OUTPATIENT
Start: 2020-11-16 | End: 2020-11-18 | Stop reason: HOSPADM

## 2020-11-16 RX ORDER — SUCCINYLCHOLINE/SOD CL,ISO/PF 100 MG/5ML
SYRINGE (ML) INTRAVENOUS AS NEEDED
Status: DISCONTINUED | OUTPATIENT
Start: 2020-11-16 | End: 2020-11-16

## 2020-11-16 RX ORDER — POTASSIUM CHLORIDE 14.9 MG/ML
20 INJECTION INTRAVENOUS
Status: COMPLETED | OUTPATIENT
Start: 2020-11-16 | End: 2020-11-16

## 2020-11-16 RX ADMIN — ENOXAPARIN SODIUM 40 MG: 40 INJECTION SUBCUTANEOUS at 08:57

## 2020-11-16 RX ADMIN — PANTOPRAZOLE SODIUM 40 MG: 40 TABLET, DELAYED RELEASE ORAL at 05:15

## 2020-11-16 RX ADMIN — ARIPIPRAZOLE 10 MG: 10 TABLET ORAL at 10:50

## 2020-11-16 RX ADMIN — ACETAMINOPHEN 650 MG: 325 TABLET, FILM COATED ORAL at 21:16

## 2020-11-16 RX ADMIN — POTASSIUM CHLORIDE 40 MEQ: 20 SOLUTION ORAL at 09:14

## 2020-11-16 RX ADMIN — METRONIDAZOLE 500 MG: 500 INJECTION, SOLUTION INTRAVENOUS at 06:12

## 2020-11-16 RX ADMIN — SODIUM CHLORIDE, SODIUM LACTATE, POTASSIUM CHLORIDE, AND CALCIUM CHLORIDE 100 ML/HR: .6; .31; .03; .02 INJECTION, SOLUTION INTRAVENOUS at 05:13

## 2020-11-16 RX ADMIN — IOHEXOL 14 ML: 240 INJECTION, SOLUTION INTRATHECAL; INTRAVASCULAR; INTRAVENOUS; ORAL at 14:30

## 2020-11-16 RX ADMIN — PROPOFOL 170 MG: 10 INJECTION, EMULSION INTRAVENOUS at 14:30

## 2020-11-16 RX ADMIN — LIDOCAINE HYDROCHLORIDE 50 MG: 20 INJECTION, SOLUTION INTRAVENOUS at 14:30

## 2020-11-16 RX ADMIN — SODIUM CHLORIDE, SODIUM LACTATE, POTASSIUM CHLORIDE, AND CALCIUM CHLORIDE: .6; .31; .03; .02 INJECTION, SOLUTION INTRAVENOUS at 15:08

## 2020-11-16 RX ADMIN — FENTANYL CITRATE 50 MCG: 50 INJECTION, SOLUTION INTRAMUSCULAR; INTRAVENOUS at 14:43

## 2020-11-16 RX ADMIN — INDOMETHACIN 100 MG: 50 SUPPOSITORY RECTAL at 14:40

## 2020-11-16 RX ADMIN — DEXAMETHASONE SODIUM PHOSPHATE 4 MG: 10 INJECTION, SOLUTION INTRAMUSCULAR; INTRAVENOUS at 14:40

## 2020-11-16 RX ADMIN — CEFAZOLIN SODIUM 1000 MG: 1 SOLUTION INTRAVENOUS at 21:17

## 2020-11-16 RX ADMIN — POTASSIUM CHLORIDE 20 MEQ: 14.9 INJECTION, SOLUTION INTRAVENOUS at 12:51

## 2020-11-16 RX ADMIN — ONDANSETRON 4 MG: 2 INJECTION INTRAMUSCULAR; INTRAVENOUS at 14:40

## 2020-11-16 RX ADMIN — ENOXAPARIN SODIUM 40 MG: 40 INJECTION SUBCUTANEOUS at 17:22

## 2020-11-16 RX ADMIN — HYDROMORPHONE HYDROCHLORIDE 0.2 MG: 1 INJECTION, SOLUTION INTRAMUSCULAR; INTRAVENOUS; SUBCUTANEOUS at 03:29

## 2020-11-16 RX ADMIN — METRONIDAZOLE 500 MG: 500 INJECTION, SOLUTION INTRAVENOUS at 16:09

## 2020-11-16 RX ADMIN — Medication 100 MG: at 14:30

## 2020-11-16 RX ADMIN — CEFAZOLIN SODIUM 1000 MG: 1 SOLUTION INTRAVENOUS at 16:09

## 2020-11-16 RX ADMIN — POTASSIUM CHLORIDE 20 MEQ: 14.9 INJECTION, SOLUTION INTRAVENOUS at 10:47

## 2020-11-16 RX ADMIN — PROPOFOL 30 MG: 10 INJECTION, EMULSION INTRAVENOUS at 14:43

## 2020-11-16 RX ADMIN — FENTANYL CITRATE 50 MCG: 50 INJECTION, SOLUTION INTRAMUSCULAR; INTRAVENOUS at 14:30

## 2020-11-16 RX ADMIN — ACETAMINOPHEN 650 MG: 325 TABLET, FILM COATED ORAL at 06:12

## 2020-11-16 RX ADMIN — METRONIDAZOLE 500 MG: 500 INJECTION, SOLUTION INTRAVENOUS at 22:05

## 2020-11-16 RX ADMIN — ONDANSETRON 4 MG: 2 INJECTION INTRAMUSCULAR; INTRAVENOUS at 20:41

## 2020-11-16 RX ADMIN — CEFAZOLIN SODIUM 1000 MG: 1 SOLUTION INTRAVENOUS at 05:15

## 2020-11-16 RX ADMIN — HYDROMORPHONE HYDROCHLORIDE 0.2 MG: 1 INJECTION, SOLUTION INTRAMUSCULAR; INTRAVENOUS; SUBCUTANEOUS at 00:19

## 2020-11-17 ENCOUNTER — ANESTHESIA EVENT (INPATIENT)
Dept: PERIOP | Facility: HOSPITAL | Age: 25
DRG: 263 | End: 2020-11-17
Payer: COMMERCIAL

## 2020-11-17 ENCOUNTER — ANESTHESIA (INPATIENT)
Dept: PERIOP | Facility: HOSPITAL | Age: 25
DRG: 263 | End: 2020-11-17
Payer: COMMERCIAL

## 2020-11-17 ENCOUNTER — APPOINTMENT (INPATIENT)
Dept: RADIOLOGY | Facility: HOSPITAL | Age: 25
DRG: 263 | End: 2020-11-17
Payer: COMMERCIAL

## 2020-11-17 VITALS — HEART RATE: 109 BPM

## 2020-11-17 LAB
ALBUMIN SERPL BCP-MCNC: 2.9 G/DL (ref 3.5–5)
ALP SERPL-CCNC: 95 U/L (ref 46–116)
ALT SERPL W P-5'-P-CCNC: 118 U/L (ref 12–78)
ANION GAP SERPL CALCULATED.3IONS-SCNC: 5 MMOL/L (ref 4–13)
AST SERPL W P-5'-P-CCNC: 36 U/L (ref 5–45)
ATRIAL RATE: 71 BPM
BASOPHILS # BLD AUTO: 0.02 THOUSANDS/ΜL (ref 0–0.1)
BASOPHILS NFR BLD AUTO: 0 % (ref 0–1)
BILIRUB DIRECT SERPL-MCNC: 0.18 MG/DL (ref 0–0.2)
BILIRUB SERPL-MCNC: 0.4 MG/DL (ref 0.2–1)
BUN SERPL-MCNC: 14 MG/DL (ref 5–25)
CALCIUM SERPL-MCNC: 8.5 MG/DL (ref 8.3–10.1)
CHLORIDE SERPL-SCNC: 115 MMOL/L (ref 100–108)
CO2 SERPL-SCNC: 26 MMOL/L (ref 21–32)
CREAT SERPL-MCNC: 0.74 MG/DL (ref 0.6–1.3)
EOSINOPHIL # BLD AUTO: 0.01 THOUSAND/ΜL (ref 0–0.61)
EOSINOPHIL NFR BLD AUTO: 0 % (ref 0–6)
ERYTHROCYTE [DISTWIDTH] IN BLOOD BY AUTOMATED COUNT: 14 % (ref 11.6–15.1)
GFR SERPL CREATININE-BSD FRML MDRD: 128 ML/MIN/1.73SQ M
GLUCOSE SERPL-MCNC: 108 MG/DL (ref 65–140)
HCT VFR BLD AUTO: 31.3 % (ref 34.8–46.1)
HGB BLD-MCNC: 10.2 G/DL (ref 11.5–15.4)
IMM GRANULOCYTES # BLD AUTO: 0.02 THOUSAND/UL (ref 0–0.2)
IMM GRANULOCYTES NFR BLD AUTO: 0 % (ref 0–2)
LYMPHOCYTES # BLD AUTO: 1.74 THOUSANDS/ΜL (ref 0.6–4.47)
LYMPHOCYTES NFR BLD AUTO: 24 % (ref 14–44)
MAGNESIUM SERPL-MCNC: 2.7 MG/DL (ref 1.6–2.6)
MCH RBC QN AUTO: 27.8 PG (ref 26.8–34.3)
MCHC RBC AUTO-ENTMCNC: 32.6 G/DL (ref 31.4–37.4)
MCV RBC AUTO: 85 FL (ref 82–98)
MONOCYTES # BLD AUTO: 0.76 THOUSAND/ΜL (ref 0.17–1.22)
MONOCYTES NFR BLD AUTO: 11 % (ref 4–12)
NEUTROPHILS # BLD AUTO: 4.66 THOUSANDS/ΜL (ref 1.85–7.62)
NEUTS SEG NFR BLD AUTO: 65 % (ref 43–75)
NRBC BLD AUTO-RTO: 0 /100 WBCS
P AXIS: 60 DEGREES
PLATELET # BLD AUTO: 330 THOUSANDS/UL (ref 149–390)
PMV BLD AUTO: 10 FL (ref 8.9–12.7)
POTASSIUM SERPL-SCNC: 4.2 MMOL/L (ref 3.5–5.3)
PR INTERVAL: 158 MS
PROT SERPL-MCNC: 5.9 G/DL (ref 6.4–8.2)
QRS AXIS: 68 DEGREES
QRSD INTERVAL: 72 MS
QT INTERVAL: 400 MS
QTC INTERVAL: 434 MS
RBC # BLD AUTO: 3.67 MILLION/UL (ref 3.81–5.12)
SODIUM SERPL-SCNC: 146 MMOL/L (ref 136–145)
T WAVE AXIS: 33 DEGREES
VENTRICULAR RATE: 71 BPM
WBC # BLD AUTO: 7.21 THOUSAND/UL (ref 4.31–10.16)

## 2020-11-17 PROCEDURE — NC001 PR NO CHARGE: Performed by: SURGERY

## 2020-11-17 PROCEDURE — 0FT44ZZ RESECTION OF GALLBLADDER, PERCUTANEOUS ENDOSCOPIC APPROACH: ICD-10-PCS | Performed by: SURGERY

## 2020-11-17 PROCEDURE — 88304 TISSUE EXAM BY PATHOLOGIST: CPT | Performed by: PATHOLOGY

## 2020-11-17 PROCEDURE — 99233 SBSQ HOSP IP/OBS HIGH 50: CPT | Performed by: SURGERY

## 2020-11-17 PROCEDURE — 47562 LAPAROSCOPIC CHOLECYSTECTOMY: CPT | Performed by: SURGERY

## 2020-11-17 PROCEDURE — 99232 SBSQ HOSP IP/OBS MODERATE 35: CPT | Performed by: INTERNAL MEDICINE

## 2020-11-17 PROCEDURE — 83735 ASSAY OF MAGNESIUM: CPT | Performed by: SURGERY

## 2020-11-17 PROCEDURE — 93010 ELECTROCARDIOGRAM REPORT: CPT | Performed by: INTERNAL MEDICINE

## 2020-11-17 PROCEDURE — 80076 HEPATIC FUNCTION PANEL: CPT | Performed by: SURGERY

## 2020-11-17 PROCEDURE — 80048 BASIC METABOLIC PNL TOTAL CA: CPT | Performed by: SURGERY

## 2020-11-17 PROCEDURE — 85025 COMPLETE CBC W/AUTO DIFF WBC: CPT | Performed by: SURGERY

## 2020-11-17 RX ORDER — PROPOFOL 10 MG/ML
INJECTION, EMULSION INTRAVENOUS AS NEEDED
Status: DISCONTINUED | OUTPATIENT
Start: 2020-11-17 | End: 2020-11-17

## 2020-11-17 RX ORDER — GLYCOPYRROLATE 0.2 MG/ML
INJECTION INTRAMUSCULAR; INTRAVENOUS AS NEEDED
Status: DISCONTINUED | OUTPATIENT
Start: 2020-11-17 | End: 2020-11-17

## 2020-11-17 RX ORDER — DEXAMETHASONE SODIUM PHOSPHATE 10 MG/ML
INJECTION, SOLUTION INTRAMUSCULAR; INTRAVENOUS AS NEEDED
Status: DISCONTINUED | OUTPATIENT
Start: 2020-11-17 | End: 2020-11-17

## 2020-11-17 RX ORDER — MAGNESIUM HYDROXIDE 1200 MG/15ML
LIQUID ORAL AS NEEDED
Status: DISCONTINUED | OUTPATIENT
Start: 2020-11-17 | End: 2020-11-17 | Stop reason: HOSPADM

## 2020-11-17 RX ORDER — HYDROMORPHONE HCL/PF 1 MG/ML
0.2 SYRINGE (ML) INJECTION
Status: DISCONTINUED | OUTPATIENT
Start: 2020-11-17 | End: 2020-11-17 | Stop reason: HOSPADM

## 2020-11-17 RX ORDER — NEOSTIGMINE METHYLSULFATE 1 MG/ML
INJECTION INTRAVENOUS AS NEEDED
Status: DISCONTINUED | OUTPATIENT
Start: 2020-11-17 | End: 2020-11-17

## 2020-11-17 RX ORDER — BUPIVACAINE HYDROCHLORIDE 5 MG/ML
INJECTION, SOLUTION PERINEURAL AS NEEDED
Status: DISCONTINUED | OUTPATIENT
Start: 2020-11-17 | End: 2020-11-17 | Stop reason: HOSPADM

## 2020-11-17 RX ORDER — CEFAZOLIN SODIUM 1 G/50ML
1000 SOLUTION INTRAVENOUS
Status: DISCONTINUED | OUTPATIENT
Start: 2020-11-17 | End: 2020-11-17

## 2020-11-17 RX ORDER — ONDANSETRON 2 MG/ML
4 INJECTION INTRAMUSCULAR; INTRAVENOUS ONCE AS NEEDED
Status: DISCONTINUED | OUTPATIENT
Start: 2020-11-17 | End: 2020-11-17 | Stop reason: HOSPADM

## 2020-11-17 RX ORDER — ROCURONIUM BROMIDE 10 MG/ML
INJECTION, SOLUTION INTRAVENOUS AS NEEDED
Status: DISCONTINUED | OUTPATIENT
Start: 2020-11-17 | End: 2020-11-17

## 2020-11-17 RX ORDER — MIDAZOLAM HYDROCHLORIDE 2 MG/2ML
INJECTION, SOLUTION INTRAMUSCULAR; INTRAVENOUS AS NEEDED
Status: DISCONTINUED | OUTPATIENT
Start: 2020-11-17 | End: 2020-11-17

## 2020-11-17 RX ORDER — FENTANYL CITRATE/PF 50 MCG/ML
25 SYRINGE (ML) INJECTION
Status: DISCONTINUED | OUTPATIENT
Start: 2020-11-17 | End: 2020-11-17 | Stop reason: HOSPADM

## 2020-11-17 RX ORDER — ONDANSETRON 2 MG/ML
INJECTION INTRAMUSCULAR; INTRAVENOUS AS NEEDED
Status: DISCONTINUED | OUTPATIENT
Start: 2020-11-17 | End: 2020-11-17

## 2020-11-17 RX ORDER — OXYCODONE HYDROCHLORIDE 5 MG/1
5 TABLET ORAL EVERY 4 HOURS PRN
Status: DISCONTINUED | OUTPATIENT
Start: 2020-11-17 | End: 2020-11-18 | Stop reason: HOSPADM

## 2020-11-17 RX ORDER — FENTANYL CITRATE 50 UG/ML
INJECTION, SOLUTION INTRAMUSCULAR; INTRAVENOUS AS NEEDED
Status: DISCONTINUED | OUTPATIENT
Start: 2020-11-17 | End: 2020-11-17

## 2020-11-17 RX ORDER — PROMETHAZINE HYDROCHLORIDE 25 MG/ML
12.5 INJECTION, SOLUTION INTRAMUSCULAR; INTRAVENOUS ONCE AS NEEDED
Status: DISCONTINUED | OUTPATIENT
Start: 2020-11-17 | End: 2020-11-17 | Stop reason: HOSPADM

## 2020-11-17 RX ORDER — KETOROLAC TROMETHAMINE 30 MG/ML
INJECTION, SOLUTION INTRAMUSCULAR; INTRAVENOUS AS NEEDED
Status: DISCONTINUED | OUTPATIENT
Start: 2020-11-17 | End: 2020-11-17

## 2020-11-17 RX ORDER — MEPERIDINE HYDROCHLORIDE 25 MG/ML
12.5 INJECTION INTRAMUSCULAR; INTRAVENOUS; SUBCUTANEOUS
Status: DISCONTINUED | OUTPATIENT
Start: 2020-11-17 | End: 2020-11-17 | Stop reason: HOSPADM

## 2020-11-17 RX ORDER — CEFAZOLIN SODIUM 1 G/3ML
INJECTION, POWDER, FOR SOLUTION INTRAMUSCULAR; INTRAVENOUS AS NEEDED
Status: DISCONTINUED | OUTPATIENT
Start: 2020-11-17 | End: 2020-11-17

## 2020-11-17 RX ADMIN — SODIUM CHLORIDE, SODIUM LACTATE, POTASSIUM CHLORIDE, AND CALCIUM CHLORIDE 100 ML/HR: .6; .31; .03; .02 INJECTION, SOLUTION INTRAVENOUS at 11:37

## 2020-11-17 RX ADMIN — FENTANYL CITRATE 50 MCG: 50 INJECTION, SOLUTION INTRAMUSCULAR; INTRAVENOUS at 08:05

## 2020-11-17 RX ADMIN — Medication 25 MCG: at 11:20

## 2020-11-17 RX ADMIN — SODIUM CHLORIDE, SODIUM LACTATE, POTASSIUM CHLORIDE, AND CALCIUM CHLORIDE: .6; .31; .03; .02 INJECTION, SOLUTION INTRAVENOUS at 08:29

## 2020-11-17 RX ADMIN — ENOXAPARIN SODIUM 40 MG: 40 INJECTION SUBCUTANEOUS at 12:39

## 2020-11-17 RX ADMIN — NEOSTIGMINE METHYLSULFATE 2 MG: 1 INJECTION, SOLUTION INTRAVENOUS at 09:49

## 2020-11-17 RX ADMIN — ONDANSETRON 4 MG: 2 INJECTION INTRAMUSCULAR; INTRAVENOUS at 09:45

## 2020-11-17 RX ADMIN — HYDROMORPHONE HYDROCHLORIDE 0.5 MG: 1 INJECTION, SOLUTION INTRAMUSCULAR; INTRAVENOUS; SUBCUTANEOUS at 13:36

## 2020-11-17 RX ADMIN — LIDOCAINE HYDROCHLORIDE 50 MG: 20 INJECTION, SOLUTION INTRAVENOUS at 08:08

## 2020-11-17 RX ADMIN — Medication 25 MCG: at 11:00

## 2020-11-17 RX ADMIN — PANTOPRAZOLE SODIUM 40 MG: 40 TABLET, DELAYED RELEASE ORAL at 05:21

## 2020-11-17 RX ADMIN — MIDAZOLAM 2 MG: 1 INJECTION INTRAMUSCULAR; INTRAVENOUS at 08:04

## 2020-11-17 RX ADMIN — ARIPIPRAZOLE 10 MG: 10 TABLET ORAL at 12:38

## 2020-11-17 RX ADMIN — POLYETHYLENE GLYCOL 3350 17 G: 17 POWDER, FOR SOLUTION ORAL at 12:39

## 2020-11-17 RX ADMIN — SODIUM CHLORIDE, SODIUM LACTATE, POTASSIUM CHLORIDE, AND CALCIUM CHLORIDE 100 ML/HR: .6; .31; .03; .02 INJECTION, SOLUTION INTRAVENOUS at 00:01

## 2020-11-17 RX ADMIN — PROPOFOL 100 MG: 10 INJECTION, EMULSION INTRAVENOUS at 08:09

## 2020-11-17 RX ADMIN — KETOROLAC TROMETHAMINE 15 MG: 30 INJECTION, SOLUTION INTRAMUSCULAR at 09:46

## 2020-11-17 RX ADMIN — Medication 25 MCG: at 11:25

## 2020-11-17 RX ADMIN — FENTANYL CITRATE 25 MCG: 50 INJECTION, SOLUTION INTRAMUSCULAR; INTRAVENOUS at 09:51

## 2020-11-17 RX ADMIN — ENOXAPARIN SODIUM 40 MG: 40 INJECTION SUBCUTANEOUS at 17:14

## 2020-11-17 RX ADMIN — PROPOFOL 100 MG: 10 INJECTION, EMULSION INTRAVENOUS at 08:08

## 2020-11-17 RX ADMIN — CEFAZOLIN SODIUM 1000 MG: 1 SOLUTION INTRAVENOUS at 05:21

## 2020-11-17 RX ADMIN — OXYCODONE HYDROCHLORIDE 5 MG: 5 TABLET ORAL at 17:25

## 2020-11-17 RX ADMIN — DEXAMETHASONE SODIUM PHOSPHATE 5 MG: 10 INJECTION, SOLUTION INTRAMUSCULAR; INTRAVENOUS at 08:09

## 2020-11-17 RX ADMIN — ROCURONIUM BROMIDE 50 MG: 50 INJECTION, SOLUTION INTRAVENOUS at 08:09

## 2020-11-17 RX ADMIN — PROPOFOL 50 MG: 10 INJECTION, EMULSION INTRAVENOUS at 10:02

## 2020-11-17 RX ADMIN — PHENYLEPHRINE HYDROCHLORIDE 50 MCG: 10 INJECTION INTRAVENOUS at 09:01

## 2020-11-17 RX ADMIN — ACETAMINOPHEN 650 MG: 325 TABLET, FILM COATED ORAL at 20:15

## 2020-11-17 RX ADMIN — FENTANYL CITRATE 50 MCG: 50 INJECTION, SOLUTION INTRAMUSCULAR; INTRAVENOUS at 09:14

## 2020-11-17 RX ADMIN — FENTANYL CITRATE 50 MCG: 50 INJECTION, SOLUTION INTRAMUSCULAR; INTRAVENOUS at 08:28

## 2020-11-17 RX ADMIN — Medication 25 MCG: at 10:57

## 2020-11-17 RX ADMIN — GLYCOPYRROLATE 0.4 MG: 0.2 INJECTION, SOLUTION INTRAMUSCULAR; INTRAVENOUS at 09:49

## 2020-11-17 RX ADMIN — CEFAZOLIN 1000 MG: 1 INJECTION, POWDER, FOR SOLUTION INTRAVENOUS at 08:06

## 2020-11-17 RX ADMIN — FENTANYL CITRATE 25 MCG: 50 INJECTION, SOLUTION INTRAMUSCULAR; INTRAVENOUS at 10:12

## 2020-11-17 RX ADMIN — METRONIDAZOLE 500 MG: 500 INJECTION, SOLUTION INTRAVENOUS at 05:56

## 2020-11-18 VITALS
WEIGHT: 124 LBS | DIASTOLIC BLOOD PRESSURE: 72 MMHG | SYSTOLIC BLOOD PRESSURE: 107 MMHG | HEIGHT: 60 IN | BODY MASS INDEX: 24.35 KG/M2 | HEART RATE: 77 BPM | RESPIRATION RATE: 16 BRPM | OXYGEN SATURATION: 98 % | TEMPERATURE: 98.6 F

## 2020-11-18 PROCEDURE — NC001 PR NO CHARGE: Performed by: SURGERY

## 2020-11-18 PROCEDURE — 99024 POSTOP FOLLOW-UP VISIT: CPT | Performed by: PHYSICIAN ASSISTANT

## 2020-11-18 RX ORDER — ACETAMINOPHEN 325 MG/1
650 TABLET ORAL EVERY 6 HOURS PRN
Start: 2020-11-18

## 2020-11-18 RX ORDER — IBUPROFEN 200 MG
400 TABLET ORAL EVERY 6 HOURS PRN
Qty: 30 TABLET | Refills: 0
Start: 2020-11-18 | End: 2020-12-18

## 2020-11-18 RX ORDER — OXYCODONE HYDROCHLORIDE 5 MG/1
2.5-5 TABLET ORAL EVERY 4 HOURS PRN
Qty: 15 TABLET | Refills: 0 | Status: SHIPPED | OUTPATIENT
Start: 2020-11-18 | End: 2020-11-28

## 2020-11-18 RX ADMIN — ACETAMINOPHEN 650 MG: 325 TABLET, FILM COATED ORAL at 07:40

## 2020-11-18 RX ADMIN — ARIPIPRAZOLE 10 MG: 10 TABLET ORAL at 08:20

## 2020-11-18 RX ADMIN — POLYETHYLENE GLYCOL 3350 17 G: 17 POWDER, FOR SOLUTION ORAL at 08:20

## 2020-11-18 RX ADMIN — ENOXAPARIN SODIUM 40 MG: 40 INJECTION SUBCUTANEOUS at 08:20

## 2020-11-18 RX ADMIN — PANTOPRAZOLE SODIUM 40 MG: 40 TABLET, DELAYED RELEASE ORAL at 05:36

## 2020-11-18 RX ADMIN — OXYCODONE HYDROCHLORIDE 5 MG: 5 TABLET ORAL at 00:15

## 2020-11-19 ENCOUNTER — TELEPHONE (OUTPATIENT)
Dept: BEHAVIORAL/MENTAL HEALTH CLINIC | Facility: CLINIC | Age: 25
End: 2020-11-19

## 2020-11-20 LAB
BACTERIA BLD CULT: NORMAL
BACTERIA BLD CULT: NORMAL

## 2020-12-09 ENCOUNTER — OFFICE VISIT (OUTPATIENT)
Dept: SURGERY | Facility: CLINIC | Age: 25
End: 2020-12-09

## 2020-12-09 VITALS — WEIGHT: 123.6 LBS | BODY MASS INDEX: 24.26 KG/M2 | TEMPERATURE: 97.6 F | HEIGHT: 60 IN

## 2020-12-09 DIAGNOSIS — Z90.49 S/P LAPAROSCOPIC CHOLECYSTECTOMY: Primary | ICD-10-CM

## 2020-12-09 PROBLEM — K80.50 CHOLEDOCHOLITHIASIS: Status: RESOLVED | Noted: 2020-11-16 | Resolved: 2020-12-09

## 2020-12-09 PROCEDURE — 99024 POSTOP FOLLOW-UP VISIT: CPT | Performed by: SURGERY

## 2020-12-09 RX ORDER — ONDANSETRON 4 MG/1
4 TABLET, ORALLY DISINTEGRATING ORAL EVERY 6 HOURS PRN
Qty: 20 TABLET | Refills: 0 | Status: SHIPPED | OUTPATIENT
Start: 2020-12-09 | End: 2022-05-11

## 2020-12-17 ENCOUNTER — TELEMEDICINE (OUTPATIENT)
Dept: BEHAVIORAL/MENTAL HEALTH CLINIC | Facility: CLINIC | Age: 25
End: 2020-12-17
Payer: COMMERCIAL

## 2020-12-17 DIAGNOSIS — F25.0 SCHIZOAFFECTIVE DISORDER, BIPOLAR TYPE (HCC): ICD-10-CM

## 2020-12-17 DIAGNOSIS — F43.20 ADJUSTMENT DISORDER, UNSPECIFIED TYPE: Primary | ICD-10-CM

## 2020-12-17 PROCEDURE — 90834 PSYTX W PT 45 MINUTES: CPT | Performed by: PSYCHIATRY & NEUROLOGY

## 2020-12-26 ENCOUNTER — TELEPHONE (OUTPATIENT)
Dept: OTHER | Facility: OTHER | Age: 25
End: 2020-12-26

## 2020-12-28 DIAGNOSIS — F25.0 SCHIZOAFFECTIVE DISORDER, BIPOLAR TYPE (HCC): ICD-10-CM

## 2020-12-28 DIAGNOSIS — I10 BENIGN ESSENTIAL HYPERTENSION: ICD-10-CM

## 2020-12-28 RX ORDER — ARIPIPRAZOLE 10 MG/1
TABLET ORAL
Qty: 30 TABLET | Refills: 1 | Status: SHIPPED | OUTPATIENT
Start: 2020-12-28 | End: 2021-02-18 | Stop reason: SDUPTHER

## 2021-01-22 ENCOUNTER — TELEMEDICINE (OUTPATIENT)
Dept: BEHAVIORAL/MENTAL HEALTH CLINIC | Facility: CLINIC | Age: 26
End: 2021-01-22
Payer: COMMERCIAL

## 2021-01-22 DIAGNOSIS — F25.0 SCHIZOAFFECTIVE DISORDER, BIPOLAR TYPE (HCC): ICD-10-CM

## 2021-01-22 DIAGNOSIS — F43.20 ADJUSTMENT DISORDER, UNSPECIFIED TYPE: Primary | ICD-10-CM

## 2021-01-22 PROCEDURE — 90834 PSYTX W PT 45 MINUTES: CPT | Performed by: PSYCHIATRY & NEUROLOGY

## 2021-01-22 NOTE — PSYCH
This note was not shared with the patient due to this is a psychotherapy note    Virtual Regular Visit  Session time [id number] (total time 46 minutes)    Assessment/Plan:    Problem List Items Addressed This Visit        Other    Adjustment disorder - Primary    Schizoaffective disorder, bipolar type ([facility] [address] )               Reason for visit is No chief complaint on file  Encounter provider OFE Alvarez    Provider located at [address] [place] [address] [address]  [place] [id number]      Recent Visits  No visits were found meeting these conditions  Showing recent visits within past 7 days and meeting all other requirements     Future Appointments  No visits were found meeting these conditions  Showing future appointments within next 150 days and meeting all other requirements        The patient was identified by name and date of birth  Kameron Dickson was informed that this is a telemedicine visit and that the visit is being conducted through [employer] 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, and finds this to be challenging and draining  She also has been having some difficulty sleeping, and last night did not sleep at all all night long    She said that when she struggles to sleep, she will at least usually sleep for an hour or two, but last night she did not even sleep for a couple of minutes  Discussed sleep hygiene, not napping during the day, etc, which Brad William said that she will try to work on  Brad William talked about her anxiety being higher when she listens to the news or coworkers talk about things going on in the world  However, she finds comfort in prayer, and uses prayer often  She noted that she wants to start a new hobby, and will start with coloring as she has the supplies, and then maybe graduate to painting if that goes well and holds her interest   She said that her concerns with negative vibes at work have eased and things with her coworkers are going well  Her neighbors are difficult, and there were instances that created some anxiety for her, but she is feeling better about that now as well, as there have been no further instances since a significant one involving police a week or so ago  She said that she remains polite and does not interact with them, which works for her  A: Brad William presented as very tired today, yawning throughout the session and seeming to struggle with staying awake  She was engaged in the session however, and openly engaged in thoughtful discussion  Her concentration was good, insight and judgment intact, and she denies SI HI and psychosis  P: Brad William will work on sleep hygiene and anxiety reduction techniques discussed today, and will follow up in two weeks as scheduled        HPI     Past Medical History:   Diagnosis Date    Acid reflux     Schizoaffective disorder ([facility] [address] )        Past Surgical History:   Procedure Laterality Date    CHOLECYSTECTOMY LAPAROSCOPIC N/A 11/17/2020    Procedure: Laparoscopic cholecystectomy;  Surgeon: Nena Palomo MD;  [facility];  Service: General       Current Outpatient Medications   Medication Sig Dispense Refill    acetaminophen (TYLENOL) 325 mg tablet Take 2 tablets (650 mg total) by mouth every 6 (six) hours as needed (mild pain)      amLODIPine (NORVASC) 5 mg tablet       ARIPiprazole (ABILIFY) 10 mg tablet Take 1/2 tab (5mg) PO daily 30 tablet 1    hydrochlorothiazide (HYDRODIURIL) 12 5 mg tablet       ibuprofen (MOTRIN) 200 mg tablet Take 2 tablets (400 mg total) by mouth every 6 (six) hours as needed for mild pain or headaches (Take with food ) 30 tablet 0    loratadine (CLARITIN) 10 mg tablet       Multiple Vitamin (multivitamin) tablet Take 1 tablet by mouth      norgestimate-ethinyl estradiol (ORTHO TRI-CYCLEN LO) 0 18/0 215/0 25 MG-25 MCG per tablet Take 1 tablet by mouth daily      omeprazole (PriLOSEC) 40 MG capsule       ondansetron (ZOFRAN-ODT) 4 mg disintegrating tablet Take 1 tablet (4 mg total) by mouth every 6 (six) hours as needed for nausea or vomiting 20 tablet 0     No current facility-administered medications for this visit  Allergies   Allergen Reactions    Other      Seasonal allergy         Review of Systems    Video Exam    There were no vitals filed for this visit  Physical Exam     I spent 46 minutes directly with the patient during this visit      VIRTUAL VISIT DISCLAIMER    Live Ceja acknowledges that she has consented to an online visit or consultation  She understands that the online visit is based solely on information provided by her, and that, in the absence of a face-to-face physical evaluation by the physician, the diagnosis she receives is both limited and provisional in terms of accuracy and completeness  This is not intended to replace a full medical face-to-face evaluation by the physician  Live Ceja understands and accepts these terms

## 2021-02-01 ENCOUNTER — TELEPHONE (OUTPATIENT)
Dept: OTHER | Facility: OTHER | Age: 26
End: 2021-02-01

## 2021-02-01 ENCOUNTER — TELEPHONE (OUTPATIENT)
Dept: PSYCHIATRY | Facility: CLINIC | Age: 26
End: 2021-02-01

## 2021-02-01 DIAGNOSIS — F25.0 SCHIZOAFFECTIVE DISORDER, BIPOLAR TYPE (HCC): Primary | ICD-10-CM

## 2021-02-01 DIAGNOSIS — F43.20 ADJUSTMENT DISORDER, UNSPECIFIED TYPE: ICD-10-CM

## 2021-02-01 DIAGNOSIS — M62.838 MUSCLE SPASM: ICD-10-CM

## 2021-02-01 RX ORDER — BENZTROPINE MESYLATE 0.5 MG/1
0.5 TABLET ORAL 2 TIMES DAILY
Qty: 60 TABLET | Refills: 0 | Status: SHIPPED | OUTPATIENT
Start: 2021-02-01 | End: 2021-02-18 | Stop reason: SDUPTHER

## 2021-02-01 NOTE — TELEPHONE ENCOUNTER
Geri called in to the emergency line stating she feels as though "my muscle is curving at times "  I get this once every couple of weeks, I woke up this morning around 5 am and it was coming and going for about 1 hour  She states she is able to control her muscles, she denies all other symptoms of EPS or TD  To be cautious will start on cogentin 0 5 mg po bid until next visit to see is symptoms resolve  Patient education completed as follows:   Risks for cogentin discussed including racing heart, significant anticholinergic effects (including rare psychosis; blurred vision, xerostomia, constipation, etc), N/V/C, headaches, edema, and others

## 2021-02-03 ENCOUNTER — TELEMEDICINE (OUTPATIENT)
Dept: BEHAVIORAL/MENTAL HEALTH CLINIC | Facility: CLINIC | Age: 26
End: 2021-02-03
Payer: COMMERCIAL

## 2021-02-03 DIAGNOSIS — F25.0 SCHIZOAFFECTIVE DISORDER, BIPOLAR TYPE (HCC): Primary | ICD-10-CM

## 2021-02-03 PROCEDURE — 90834 PSYTX W PT 45 MINUTES: CPT | Performed by: PSYCHIATRY & NEUROLOGY

## 2021-02-03 NOTE — PSYCH
This note was not shared with the patient due to this is a psychotherapy note    Virtual Regular Visit  Session time [id number] (total time 43 minutes)  It was my intent to perform this visit via video technology but the patient was not able to do a video connection so the visit was completed via audio telephone only  Assessment/Plan:    Problem List Items Addressed This Visit        Other    Schizoaffective disorder, bipolar type ([facility] [address] ) - Primary               Reason for visit is No chief complaint on file  Encounter provider OFE Galindo    Provider located at [address] [place] [id number] [phone]      Recent Visits  No visits were found meeting these conditions  Showing recent visits within past 7 days and meeting all other requirements     Future Appointments  No visits were found meeting these conditions  Showing future appointments within next 150 days and meeting all other requirements        The patient was identified by name and date of birth  Negar Babb was informed that this is a telemedicine visit and that the visit is being conducted through telephone  My office door was closed  No one else was in the room  She acknowledged consent and understanding of privacy and security of the video platform  The patient has agreed to participate and understands they can discontinue the visit at any time  Patient is aware this is a billable service  Subjective  Negar Babb is a 30 y o  female presenting for follow up  Светлана Sepulveda shared that she has been doing somewhat better since last visit, after confronting her roommates about issues that had been bothering her and keeping her up at night  Now that she has addressed things with them, she is calmer, and has been able to use breathing and grounding techniques to help her calm down at night and fall asleep relatively easily  She shared that her mood has been good, and she recently had a long, six day weekend that she enjoyed at home  She notes that she typically does not enjoy just sitting around at home, but did this time and was able to relax and watch some shows and do some school work  She also talked about cooking on her own more, with her brother's encouragement, and has been trying new foods that she typically would not make for herself  She said that work is going well, school is not too hard or involved yet, and things with her roommates are quiet now that she is basically keeping to herself  A: Светлана Sepulveda continues to make good progress in managing her anxiety and distorted thinking, and was able to confront her roommates successfully, even though this is counterintuitive for her  She presented today as calm and much better able to focus on the conversation compared to last session, when she was sleep-deprived and unable to focus  P: Светлана Sepulveda will continue to balance work, school and social life, and will explore her new-found hobby of cooking nutritious foods  She will return in two weeks as scheduled        HPI     Past Medical History:   Diagnosis Date    Acid reflux     Schizoaffective disorder ([facility] [address] )        Past Surgical History:   Procedure Laterality Date    CHOLECYSTECTOMY LAPAROSCOPIC N/A 11/17/2020    Procedure: Laparoscopic cholecystectomy;  Surgeon: Bony Covington MD;  [facility];  Service: General       Current Outpatient Medications   Medication Sig Dispense Refill    acetaminophen (TYLENOL) 325 mg tablet Take 2 tablets (650 mg total) by mouth every 6 (six) hours as needed (mild pain)      amLODIPine (NORVASC) 5 mg tablet       ARIPiprazole (ABILIFY) 10 mg tablet Take 1/2 tab (5mg) PO daily 30 tablet 1    benztropine (COGENTIN) 0 5 mg tablet Take 1 tablet (0 5 mg total) by mouth 2 (two) times a day 60 tablet 0    hydrochlorothiazide (HYDRODIURIL) 12 5 mg tablet       ibuprofen (MOTRIN) 200 mg tablet Take 2 tablets (400 mg total) by mouth every 6 (six) hours as needed for mild pain or headaches (Take with food ) 30 tablet 0    loratadine (CLARITIN) 10 mg tablet       Multiple Vitamin (multivitamin) tablet Take 1 tablet by mouth      norgestimate-ethinyl estradiol (ORTHO TRI-CYCLEN LO) 0 18/0 215/0 25 MG-25 MCG per tablet Take 1 tablet by mouth daily      omeprazole (PriLOSEC) 40 MG capsule       ondansetron (ZOFRAN-ODT) 4 mg disintegrating tablet Take 1 tablet (4 mg total) by mouth every 6 (six) hours as needed for nausea or vomiting 20 tablet 0     No current facility-administered medications for this visit  Allergies   Allergen Reactions    Other      Seasonal allergy         Review of Systems    Video Exam    There were no vitals filed for this visit  Physical Exam     I spent 43 minutes directly with the patient during this visit      VIRTUAL VISIT DISCLAIMER    Belle Da Silva acknowledges that she has consented to an online visit or consultation  She understands that the online visit is based solely on information provided by her, and that, in the absence of a face-to-face physical evaluation by the physician, the diagnosis she receives is both limited and provisional in terms of accuracy and completeness  This is not intended to replace a full medical face-to-face evaluation by the physician  Belle Da Silva understands and accepts these terms

## 2021-02-18 ENCOUNTER — TELEMEDICINE (OUTPATIENT)
Dept: PSYCHIATRY | Facility: CLINIC | Age: 26
End: 2021-02-18
Payer: COMMERCIAL

## 2021-02-18 VITALS — WEIGHT: 120 LBS | HEIGHT: 60 IN | BODY MASS INDEX: 23.56 KG/M2

## 2021-02-18 DIAGNOSIS — I10 BENIGN ESSENTIAL HYPERTENSION: ICD-10-CM

## 2021-02-18 DIAGNOSIS — F25.0 SCHIZOAFFECTIVE DISORDER, BIPOLAR TYPE (HCC): Primary | ICD-10-CM

## 2021-02-18 DIAGNOSIS — M62.838 MUSCLE SPASM: ICD-10-CM

## 2021-02-18 DIAGNOSIS — F43.20 ADJUSTMENT DISORDER, UNSPECIFIED TYPE: ICD-10-CM

## 2021-02-18 PROCEDURE — 99213 OFFICE O/P EST LOW 20 MIN: CPT | Performed by: NURSE PRACTITIONER

## 2021-02-18 RX ORDER — ARIPIPRAZOLE 10 MG/1
TABLET ORAL
Qty: 30 TABLET | Refills: 1 | Status: SHIPPED | OUTPATIENT
Start: 2021-02-18 | End: 2021-04-20 | Stop reason: SDUPTHER

## 2021-02-18 RX ORDER — BENZTROPINE MESYLATE 0.5 MG/1
0.5 TABLET ORAL 2 TIMES DAILY
Qty: 60 TABLET | Refills: 2 | Status: SHIPPED | OUTPATIENT
Start: 2021-02-18 | End: 2021-04-20 | Stop reason: SDUPTHER

## 2021-02-18 NOTE — PSYCH
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 weekend coverage for urgent situations accessed by calling the main clinic phone number      [facility] Crisis Telephone Numbers and the National Suicide Prevention Hotline Number Provided to Patient     -Treatment Plan with Pineda sutton psychotherapist at [facility]    Psychotherapy Provided:       Individual psychotherapy provided: Yes  Counseling was provided during the session today for 18 minutes  Medications, treatment progress and treatment plan reviewed with Geri  Medication changes discussed with Geri  Medication education provided to [facility]  Coping skills reviewed with Geri  Importance of medication and treatment compliance reviewed with Geri  Importance of follow up with family physician for medical issues reviewed with Geri  Reassurance and supportive therapy provided  Crisis/safety plan discussed with Geri  This note was shared with patient       MEHNAZ Tripp 02/18/21

## 2021-02-19 ENCOUNTER — TELEMEDICINE (OUTPATIENT)
Dept: BEHAVIORAL/MENTAL HEALTH CLINIC | Facility: CLINIC | Age: 26
End: 2021-02-19
Payer: COMMERCIAL

## 2021-02-19 DIAGNOSIS — F43.20 ADJUSTMENT DISORDER, UNSPECIFIED TYPE: ICD-10-CM

## 2021-02-19 DIAGNOSIS — F25.0 SCHIZOAFFECTIVE DISORDER, BIPOLAR TYPE (HCC): Primary | ICD-10-CM

## 2021-02-19 PROCEDURE — 90834 PSYTX W PT 45 MINUTES: CPT | Performed by: PSYCHIATRY & NEUROLOGY

## 2021-02-19 NOTE — PSYCH
This note was not shared with the patient due to this is a psychotherapy note     Virtual Regular Visit  It was my intent to perform this visit via video technology but the patient was not able to do a video connection so the visit was completed via audio telephone only  Session time [id number] (total time 28 minutes)    Assessment/Plan:    Problem List Items Addressed This Visit        Other    Adjustment disorder    Schizoaffective disorder, bipolar type ([facility] [address] ) - Primary               Reason for visit is No chief complaint on file  Encounter provider OFE Colunga    Provider located at [address] [place] [id number] [phone]      Recent Visits  No visits were found meeting these conditions  Showing recent visits within past 7 days and meeting all other requirements     Future Appointments  No visits were found meeting these conditions  Showing future appointments within next 150 days and meeting all other requirements        The patient was identified by name and date of birth  Fabián Rodriguez was informed that this is a telemedicine visit and that the visit is being conducted through telephone  My office door was closed  No one else was in the room  She acknowledged consent and understanding of privacy and security of the video platform  The patient has agreed to participate and understands they can discontinue the visit at any time  Patient is aware this is a billable service  Subjective  Fabián Rodriguez is a 30 y o  female presenting for follow up  Sherry Hoyt said that lately she has been doing well with both mood and anxiety  She talked to her roommate about the issues she was having, and her roommate has been much more respectful since then    Sherry Hoyt said that now it "is Virgie Clunes " She continues to work full time and take two classes, so she is tired often from being so busy, and is still catching up on sleep from the period of time she was not sleeping well  She talked a little bit about not feeling as connected to coworkers as she has in the past, because she has not had as much free time this year to sit in the staff lounge and talk to them  She said that she thought people were angry or disappointed with her because of her lack of time, but she has approached several who all told her that they understood that she was busy  Encouraged Savannah Millan to continue to be assertive in her communication with people, as it seems to have been effective in helping her feel better  A: Savannah Millan presented as tired today, with a soft, slow voice and frequent yawning  Session was cut short because she was tired and did not have much to discuss today  P: Savannah Millan will continue to work on anxiety reduction strategies and assertive communication skills discussed, will work on getting regular sleep, and will follow up in two weeks        HPI     Past Medical History:   Diagnosis Date    Acid reflux     Schizoaffective disorder ([facility] [address] )        Past Surgical History:   Procedure Laterality Date    CHOLECYSTECTOMY LAPAROSCOPIC N/A 11/17/2020    Procedure: Laparoscopic cholecystectomy;  Surgeon: Amirah Whitehead MD;  [facility];  Service: General       Current Outpatient Medications   Medication Sig Dispense Refill    acetaminophen (TYLENOL) 325 mg tablet Take 2 tablets (650 mg total) by mouth every 6 (six) hours as needed (mild pain)      amLODIPine (NORVASC) 5 mg tablet       ARIPiprazole (ABILIFY) 10 mg tablet Take 1/2 tab (5mg) PO daily 30 tablet 1    benztropine (COGENTIN) 0 5 mg tablet Take 1 tablet (0 5 mg total) by mouth 2 (two) times a day 60 tablet 2    hydrochlorothiazide (HYDRODIURIL) 12 5 mg tablet       ibuprofen (MOTRIN) 200 mg tablet Take 2 tablets (400 mg total) by mouth every 6 (six) hours as needed for mild pain or headaches (Take with food ) 30 tablet 0    loratadine (CLARITIN) 10 mg tablet  Multiple Vitamin (multivitamin) tablet Take 1 tablet by mouth      norgestimate-ethinyl estradiol (ORTHO TRI-CYCLEN LO) 0 18/0 215/0 25 MG-25 MCG per tablet Take 1 tablet by mouth daily      omeprazole (PriLOSEC) 40 MG capsule       ondansetron (ZOFRAN-ODT) 4 mg disintegrating tablet Take 1 tablet (4 mg total) by mouth every 6 (six) hours as needed for nausea or vomiting 20 tablet 0     No current facility-administered medications for this visit  Allergies   Allergen Reactions    Other      Seasonal allergy         Review of Systems    Video Exam    There were no vitals filed for this visit  Physical Exam     I spent 28 minutes directly with the patient during this visit      VIRTUAL VISIT DISCLAIMER    Sharla Tima acknowledges that she has consented to an online visit or consultation  She understands that the online visit is based solely on information provided by her, and that, in the absence of a face-to-face physical evaluation by the physician, the diagnosis she receives is both limited and provisional in terms of accuracy and completeness  This is not intended to replace a full medical face-to-face evaluation by the physician  Sharla Alfred understands and accepts these terms

## 2021-03-04 ENCOUNTER — TELEMEDICINE (OUTPATIENT)
Dept: BEHAVIORAL/MENTAL HEALTH CLINIC | Facility: CLINIC | Age: 26
End: 2021-03-04
Payer: COMMERCIAL

## 2021-03-04 DIAGNOSIS — F25.0 SCHIZOAFFECTIVE DISORDER, BIPOLAR TYPE (HCC): Primary | ICD-10-CM

## 2021-03-04 PROCEDURE — 90834 PSYTX W PT 45 MINUTES: CPT | Performed by: PSYCHIATRY & NEUROLOGY

## 2021-03-04 NOTE — PSYCH
This note was not shared with the patient due to this is a psychotherapy note    Virtual Regular Visit  It was my intent to perform this visit via video technology but the patient was not able to do a video connection so the visit was completed via audio telephone only  (link not working)    Session time [id number] (total time 21 minutes)    Assessment/Plan:    Problem List Items Addressed This Visit        Other    Schizoaffective disorder, bipolar type ([facility] [address] ) - Primary               Reason for visit is No chief complaint on file  Encounter provider OFE Craig    Provider located at [address] [place] [id number] [phone]      Recent Visits  No visits were found meeting these conditions  Showing recent visits within past 7 days and meeting all other requirements     Future Appointments  No visits were found meeting these conditions  Showing future appointments within next 150 days and meeting all other requirements        The patient was identified by name and date of birth  Belle Da Silva was informed that this is a telemedicine visit and that the visit is being conducted through telephone  My office door was closed  No one else was in the room  She acknowledged consent and understanding of privacy and security of the video platform  The patient has agreed to participate and understands they can discontinue the visit at any time  Patient is aware this is a billable service  Subjective  Belle Da Silva is a 30 y o  female presenting for follow up  Savannah Millan shared that she is doing well both in school and at work, including doing field work for school  Her grades are good and she is able to keep up with her work load  She said that she has been sleeping very well lately, and her mood and anxiety have been manageable    She also noted that she is currently looking for an apartment so she can live on her own, as she is "over the roommate thing "  Savannah Millan shared that she would like to have more of a social life, but does not have time presently, although she does enjoy her friendships at work  She also noted that she has some health concerns and needs to start working out  She is considering joining a gym to go to after work at least twice a week to help improve her health  Overall she said that she is satisfied with where she is right now  A: Savannah Millan presented as euthymic but said that she was "tired," and had a soft, slow voice congruent with being tired  Her concentration was intact, short and long term memory grossly intact, and insight and judgment were good  Denies SI HI and psychosis  P: Savannah Millan will continue to balance school and work, and will begin exercising to help with health issue and maintain better mood and anxiety levels  She will return in two weeks as scheduled        HPI     Past Medical History:   Diagnosis Date    Acid reflux     Schizoaffective disorder ([facility] [address] )        Past Surgical History:   Procedure Laterality Date    CHOLECYSTECTOMY LAPAROSCOPIC N/A 11/17/2020    Procedure: Laparoscopic cholecystectomy;  Surgeon: Amirah Whitehead MD;  [facility];  Service: General       Current Outpatient Medications   Medication Sig Dispense Refill    acetaminophen (TYLENOL) 325 mg tablet Take 2 tablets (650 mg total) by mouth every 6 (six) hours as needed (mild pain)      amLODIPine (NORVASC) 5 mg tablet       ARIPiprazole (ABILIFY) 10 mg tablet Take 1/2 tab (5mg) PO daily 30 tablet 1    benztropine (COGENTIN) 0 5 mg tablet Take 1 tablet (0 5 mg total) by mouth 2 (two) times a day 60 tablet 2    hydrochlorothiazide (HYDRODIURIL) 12 5 mg tablet       ibuprofen (MOTRIN) 200 mg tablet Take 2 tablets (400 mg total) by mouth every 6 (six) hours as needed for mild pain or headaches (Take with food ) 30 tablet 0    loratadine (CLARITIN) 10 mg tablet       Multiple Vitamin (multivitamin) tablet Take 1 tablet by mouth      norgestimate-ethinyl estradiol (ORTHO TRI-CYCLEN LO) 0 18/0 215/0 25 MG-25 MCG per tablet Take 1 tablet by mouth daily      omeprazole (PriLOSEC) 40 MG capsule       ondansetron (ZOFRAN-ODT) 4 mg disintegrating tablet Take 1 tablet (4 mg total) by mouth every 6 (six) hours as needed for nausea or vomiting 20 tablet 0     No current facility-administered medications for this visit  Allergies   Allergen Reactions    Other      Seasonal allergy         Review of Systems    Video Exam    There were no vitals filed for this visit  Physical Exam     I spent 21 minutes directly with the patient during this visit      VIRTUAL VISIT DISCLAIMER    Aureliano Taylor acknowledges that she has consented to an online visit or consultation  She understands that the online visit is based solely on information provided by her, and that, in the absence of a face-to-face physical evaluation by the physician, the diagnosis she receives is both limited and provisional in terms of accuracy and completeness  This is not intended to replace a full medical face-to-face evaluation by the physician  Aureliano Taylor understands and accepts these terms

## 2021-03-19 ENCOUNTER — TELEPHONE (OUTPATIENT)
Dept: BEHAVIORAL/MENTAL HEALTH CLINIC | Facility: CLINIC | Age: 26
End: 2021-03-19

## 2021-03-19 NOTE — TELEPHONE ENCOUNTER
----- Message from [facility] sent at 3/19/2021  3:15 PM EDT -----  Regarding: Cancellation  Cancelled today at 4 due to Midterm conflict

## 2021-04-02 ENCOUNTER — TELEPHONE (OUTPATIENT)
Dept: BEHAVIORAL/MENTAL HEALTH CLINIC | Facility: CLINIC | Age: 26
End: 2021-04-02

## 2021-04-02 NOTE — TELEPHONE ENCOUNTER
1605-Call to Johanna re: today's appointment at 1600  Yamil Camp said that she forgot to call, but cannot talk today because she has many homework assignments due tonight and does not have time to talk  Advised of next scheduled appointment

## 2021-04-14 DIAGNOSIS — Z23 ENCOUNTER FOR IMMUNIZATION: ICD-10-CM

## 2021-04-16 ENCOUNTER — TELEMEDICINE (OUTPATIENT)
Dept: BEHAVIORAL/MENTAL HEALTH CLINIC | Facility: CLINIC | Age: 26
End: 2021-04-16
Payer: COMMERCIAL

## 2021-04-16 DIAGNOSIS — F25.0 SCHIZOAFFECTIVE DISORDER, BIPOLAR TYPE (HCC): ICD-10-CM

## 2021-04-16 DIAGNOSIS — F43.20 ADJUSTMENT DISORDER, UNSPECIFIED TYPE: Primary | ICD-10-CM

## 2021-04-16 PROCEDURE — 90834 PSYTX W PT 45 MINUTES: CPT | Performed by: PSYCHIATRY & NEUROLOGY

## 2021-04-16 NOTE — PSYCH
This note was not shared with the patient due to this is a psychotherapy note    Virtual Regular Visit  Session time [id number] (total time 43 minutes)    Assessment/Plan:    Problem List Items Addressed This Visit        Other    Adjustment disorder - Primary    Schizoaffective disorder, bipolar type ([facility] [address] )               Reason for visit is No chief complaint on file  Encounter provider OFE Peraza    Provider located at [address] [place] [id number] [phone]      Recent Visits  No visits were found meeting these conditions  Showing recent visits within past 7 days and meeting all other requirements     Future Appointments  No visits were found meeting these conditions  Showing future appointments within next 150 days and meeting all other requirements        The patient was identified by name and date of birth  Jeffrey Leahy was informed that this is a telemedicine visit and that the visit is being conducted through [employer] and patient was informed that this is a secure, HIPAA-compliant platform  She agrees to proceed     My office door was closed  No one else was in the room  She acknowledged consent and understanding of privacy and security of the video platform  The patient has agreed to participate and understands they can discontinue the visit at any time  Patient is aware this is a billable service  Subjective  Jeffrey Leahy is a 30 y o  female presenting for follow up  Rachel Kiss shared that she is less stressed this week as 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, and things at work have felt better  She said that she had an "epiphany" this week, 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, more comfortable with all the work and routine, and said that now she feels like she belongs at the school    She said that this was a very good feeling for her and that she is happier because of it  She shared that last week, one of her good friends at work, whom she has talked to and spent time with over the past two years, invited her over to her house and also to a picnic  Kyle Dubin said that she panicked when her friend invited her, and she said no both times  She noted that the panic seemed to stem from feeling like her friend was going to hurt her  Explored that thought process, where her fears might stem from (dad  when Kyle Dubin was 8, friend is like a second mother-fear of another loss?), and how she can use her logical thinking to help her process fear and ease her anxiety  A: Kyle Dubin presented as euthymic, with a bright affect and good eye contact  Her speech and behavior were normal, concentration was intact  Insight and judgment were good  Denies SI HI and psychosis  Making some slow progress in feeling more comfortable socially  P: Kyle Dubin will continue to work on being patient with herself in social situations, will give herself time to think of a response before replying, and will try to schedule in time for self-care and rest   She will follow up in two weeks        HPI     Past Medical History:   Diagnosis Date    Acid reflux     Schizoaffective disorder ([facility] [address] )        Past Surgical History:   Procedure Laterality Date    CHOLECYSTECTOMY LAPAROSCOPIC N/A 2020    Procedure: Laparoscopic cholecystectomy;  Surgeon: Gina Marie MD;  Location: [facility];  Service: General       Current Outpatient Medications   Medication Sig Dispense Refill    acetaminophen (TYLENOL) 325 mg tablet Take 2 tablets (650 mg total) by mouth every 6 (six) hours as needed (mild pain)      amLODIPine (NORVASC) 5 mg tablet       ARIPiprazole (ABILIFY) 10 mg tablet Take 1/2 tab (5mg) PO daily 30 tablet 1    benztropine (COGENTIN) 0 5 mg tablet Take 1 tablet (0 5 mg total) by mouth 2 (two) times a day 60 tablet 2    hydrochlorothiazide (HYDRODIURIL) 12 5 mg tablet       ibuprofen (MOTRIN) 200 mg tablet Take 2 tablets (400 mg total) by mouth every 6 (six) hours as needed for mild pain or headaches (Take with food ) 30 tablet 0    loratadine (CLARITIN) 10 mg tablet       Multiple Vitamin (multivitamin) tablet Take 1 tablet by mouth      norgestimate-ethinyl estradiol (ORTHO TRI-CYCLEN LO) 0 18/0 215/0 25 MG-25 MCG per tablet Take 1 tablet by mouth daily      omeprazole (PriLOSEC) 40 MG capsule       ondansetron (ZOFRAN-ODT) 4 mg disintegrating tablet Take 1 tablet (4 mg total) by mouth every 6 (six) hours as needed for nausea or vomiting 20 tablet 0     No current facility-administered medications for this visit  Allergies   Allergen Reactions    Other      Seasonal allergy         Review of Systems    Video Exam    There were no vitals filed for this visit  Physical Exam     I spent 43 minutes directly with the patient during this visit      VIRTUAL VISIT DISCLAIMER    Tani Banegas acknowledges that she has consented to an online visit or consultation  She understands that the online visit is based solely on information provided by her, and that, in the absence of a face-to-face physical evaluation by the physician, the diagnosis she receives is both limited and provisional in terms of accuracy and completeness  This is not intended to replace a full medical face-to-face evaluation by the physician  Tani Banegas understands and accepts these terms

## 2021-04-16 NOTE — BH TREATMENT PLAN
Susan Tovar  1995       Date of Initial Treatment Plan: 4/16/2020    Date of Current Treatment Plan: 04/16/21    Treatment Plan Number #3    Strengths/Personal Resources for Self Care: "I am kind  I have empathy  I am compassionate  I am curious and a good listener "      Diagnosis:   1  Adjustment disorder, unspecified type     2  Schizoaffective disorder, bipolar type ([facility] [address] )         Area of Needs: "I want to be reflective  I want to be aware of that I not only do things for them, but they do things for me "      Long Term Goal 1: A I want to continue to be more reflective and to better understand a situation before I respond to it  B I will consider the "proof" I have of the concerning thoughts I have    Target Date:  8/16/2021  Completion Date: n/a          Short Term Objectives for Goal 1: AI want to remain aware of my responsibilty to manage my personal opinions  B  I will also share my rational views (in conjunction with my personal opinions)  C  I want convert my mistakes into learning experiences  D  I will maintain my awareness of the importance of maintaining my self care  Long Term Goal 2: I would like to do better socially  Target Date:  8/16/2021    Short Term Objectives for Goal 2:  A: I will give myself time to think before answering someone  B: I will work on being more present in a conversation instead of worrying about what I am going to say  C: I will try to focus on my thoughts instead of my emotions, in order to be able focus more on the conversation  GOAL 1: Modality: Individual 2x per month   Completion Date n/a and The person(s) responsible for carrying out the plan is  Geri     Goal 2: Modality: Individual 2x per month  Completion Date n/a      Behavioral Health Treatment Plan [facility]: Diagnosis and Treatment Plan explained to Afshin Blankenship relates understanding diagnosis and is agreeable to Treatment Plan   Yes    **Verbal consent provided today due to [address] distancing measures/telehealth visit      Client Comments : Please share your thoughts, feelings, need and/or experiences regarding your treatment plan: n/a    **verbal consent provided today due to COVID social distancing measures

## 2021-04-18 ENCOUNTER — IMMUNIZATIONS (OUTPATIENT)
Dept: FAMILY MEDICINE CLINIC | Facility: HOSPITAL | Age: 26
End: 2021-04-18

## 2021-04-18 DIAGNOSIS — Z23 ENCOUNTER FOR IMMUNIZATION: Primary | ICD-10-CM

## 2021-04-18 PROCEDURE — 0001A SARS-COV-2 / COVID-19 MRNA VACCINE (PFIZER-BIONTECH) 30 MCG: CPT

## 2021-04-18 PROCEDURE — 91300 SARS-COV-2 / COVID-19 MRNA VACCINE (PFIZER-BIONTECH) 30 MCG: CPT

## 2021-04-20 ENCOUNTER — TELEMEDICINE (OUTPATIENT)
Dept: PSYCHIATRY | Facility: CLINIC | Age: 26
End: 2021-04-20
Payer: COMMERCIAL

## 2021-04-20 VITALS — HEIGHT: 60 IN | WEIGHT: 133 LBS | BODY MASS INDEX: 26.11 KG/M2

## 2021-04-20 DIAGNOSIS — M62.838 MUSCLE SPASM: ICD-10-CM

## 2021-04-20 DIAGNOSIS — I10 BENIGN ESSENTIAL HYPERTENSION: ICD-10-CM

## 2021-04-20 DIAGNOSIS — F43.20 ADJUSTMENT DISORDER, UNSPECIFIED TYPE: ICD-10-CM

## 2021-04-20 DIAGNOSIS — F25.0 SCHIZOAFFECTIVE DISORDER, BIPOLAR TYPE (HCC): Primary | ICD-10-CM

## 2021-04-20 PROCEDURE — 99213 OFFICE O/P EST LOW 20 MIN: CPT | Performed by: NURSE PRACTITIONER

## 2021-04-20 RX ORDER — ARIPIPRAZOLE 10 MG/1
TABLET ORAL
Qty: 30 TABLET | Refills: 2 | Status: SHIPPED | OUTPATIENT
Start: 2021-04-20 | End: 2021-08-24 | Stop reason: SDUPTHER

## 2021-04-20 RX ORDER — NORGESTIMATE AND ETHINYL ESTRADIOL 7DAYSX3 LO
1 KIT ORAL DAILY
COMMUNITY
Start: 2021-03-23 | End: 2022-03-23

## 2021-04-20 RX ORDER — FERROUS SULFATE 325(65) MG
325 TABLET ORAL 2 TIMES DAILY
COMMUNITY
Start: 2021-03-16 | End: 2022-03-16

## 2021-04-20 RX ORDER — BENZTROPINE MESYLATE 0.5 MG/1
0.5 TABLET ORAL 2 TIMES DAILY
Qty: 60 TABLET | Refills: 2 | Status: SHIPPED | OUTPATIENT
Start: 2021-04-20 | End: 2021-08-09 | Stop reason: SDUPTHER

## 2021-04-20 RX ORDER — FERROUS SULFATE 325(65) MG
1 TABLET ORAL
COMMUNITY
Start: 2021-03-16

## 2021-04-20 NOTE — PSYCH
Virtual Regular Visit    Name and Date of Birth:  Jeffrey Leahy 30 y o  1995 MRN: [id number]    Date of Visit: April 20, 2021    Assessment/Plan:    Problem List Items Addressed This Visit        Cardiovascular and Mediastinum    Benign essential hypertension    Relevant Medications    ARIPiprazole (ABILIFY) 10 mg tablet       Other    Adjustment disorder    Relevant Medications    ARIPiprazole (ABILIFY) 10 mg tablet    Schizoaffective disorder, bipolar type ([address] ) - Primary    Relevant Medications    benztropine (COGENTIN) 0 5 mg tablet    ARIPiprazole (ABILIFY) 10 mg tablet    Muscle spasm    Relevant Medications    benztropine (COGENTIN) 0 5 mg tablet          Reason for visit is   Chief Complaint   Patient presents with    Virtual Regular Visit    Depression    Anxiety    Follow-up    Mood Swings        Encounter provider Caty Mendoza, 10 [facility]    Provider located at 10 [address] [address] [phone]      Recent Visits  No visits were found meeting these conditions  Showing recent visits within past 7 days and meeting all other requirements     Today's Visits  Date Type Provider Dept   04/20/21 [address]  [place] [address] today's visits and meeting all other requirements     Future Appointments  No visits were found meeting these conditions  Showing future appointments within next 150 days and meeting all other requirements        The patient was identified by name and date of birth  Jeffrey Leahy was informed that this is a telemedicine visit and that the visit is being conducted through [employer] and patient was informed that this is a secure, HIPAA-compliant platform  She agrees to proceed     My office door was closed  No one else was in the room  She acknowledged consent and understanding of privacy and security of the video platform   The patient has agreed to participate and understands they can discontinue the visit at any time  Patient is aware this is a billable service  Past Medical History:   Diagnosis Date    Acid reflux     Schizoaffective disorder ([facility] [address] )        Past Surgical History:   Procedure Laterality Date    CHOLECYSTECTOMY LAPAROSCOPIC N/A 11/17/2020    Procedure: Laparoscopic cholecystectomy;  Surgeon: Edward Pickard MD;  [facility];  Service: General       Current Outpatient Medications   Medication Sig Dispense Refill    acetaminophen (TYLENOL) 325 mg tablet Take 2 tablets (650 mg total) by mouth every 6 (six) hours as needed (mild pain)      amLODIPine (NORVASC) 5 mg tablet       ARIPiprazole (ABILIFY) 10 mg tablet Take 1/2 tab (5mg) PO daily 30 tablet 2    benztropine (COGENTIN) 0 5 mg tablet Take 1 tablet (0 5 mg total) by mouth 2 (two) times a day 60 tablet 2    FeroSul 325 (65 Fe) MG tablet Take 1 tablet by mouth daily with breakfast      ferrous sulfate 325 (65 Fe) mg tablet Take 325 mg by mouth 2 (two) times a day      hydrochlorothiazide (HYDRODIURIL) 12 5 mg tablet       loratadine (CLARITIN) 10 mg tablet       Multiple Vitamin (multivitamin) tablet Take 1 tablet by mouth      norgestimate-ethinyl estradiol (ORTHO TRI-CYCLEN LO) 0 18/0 215/0 25 MG-25 MCG per tablet Take 1 tablet by mouth daily      norgestimate-ethinyl estradiol (ORTHO TRI-CYCLEN LO) 0 18/0 215/0 25 MG-25 MCG per tablet Take 1 tablet by mouth daily      omeprazole (PriLOSEC) 40 MG capsule       ondansetron (ZOFRAN-ODT) 4 mg disintegrating tablet Take 1 tablet (4 mg total) by mouth every 6 (six) hours as needed for nausea or vomiting 20 tablet 0    ibuprofen (MOTRIN) 200 mg tablet Take 2 tablets (400 mg total) by mouth every 6 (six) hours as needed for mild pain or headaches (Take with food ) 30 tablet 0     No current facility-administered medications for this visit           Allergies   Allergen Reactions    Other Seasonal allergy         Vitals:    04/20/21 1855   Weight: 60 3 kg (133 lb)   Height: 5' (1 524 m)         I spent 30 minutes directly with the patient during this visit      VIRTUAL VISIT DISCLAIMER    Ayo Thurmanravi acknowledges that she has consented to an online visit or consultation  She understands that the online visit is based solely on information provided by her, and that, in the absence of a face-to-face physical evaluation by the physician, the diagnosis she receives is both limited and provisional in terms of accuracy and completeness  This is not intended to replace a full medical face-to-face evaluation by the physician  Ayo Ca understands and accepts these terms  SUBJECTIVE:    Noe Mosher is seen today for a follow up for Schizoaffective bipolar type, adjustment disorder, sleep disturbance  Since our last visit, overall symptoms have been stable  Noe Mosher states she was somewhat 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 but it has been going well  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  She states her medications are working well and she does not feel the need for change at this time  She states that she has been informed that her iron levels have been quite low and she is taking iron supplement 325 mg p o  b i d  She is also informed this may be why she is getting the muscle spasm or wave type sensation in her extremities  She has been on Abilify for many years and she is taking 5 mg p o  daily  The goal is that we will maintain Cogentin 0 5 mg p o  b i d  until she has the ability to have iron levels read checked  Once iron levels are at normal limits we will decrease Cogentin to 1 time daily if muscle wave type symptoms remain absent we will discontinue Cogentin and we will know that this was from low iron levels  Patient is encouraged to maintain iron replacement therapy    Currently since patient initiated iron supplementation and Cogentin muscle waves have decreased significantly  She reports that these are very very infrequent  She denies suicidal homicidal ideation, she denies auditory visual hallucinations and she denies paranoid ideation  HPI ROS:             ('was' notes: recent => remote)  Medication Side Effects:    Denies  (Was  denies)   Depression (10 worst):  0-3 (Was  0-2)   Anxiety (10 worst):  0 (Was  0)   Safety concerns (SI, HI, etc):  denies (Was  denies)   Hallucinations/Delusions  denies (Was  denies)   Sleep:  5-8 hours per night minimum no nightmares (Was  7-9 hours per night no nightmares)   Energy:  low energy good motivation feels this is from low iron levels however this is improving with iron replacement (Was  good)   Appetite:  good (Was  good)   Height  5 ft 0 in (Was  5 ft 0 in)   Weight Change:  133 lb patient reported (Was  120 lb patient reported)     Geri denies any side effects from medications unless noted above    Review Of Systems:      Constitutional low energy   ENT negative   Cardiovascular negative   Respiratory negative   Gastrointestinal negative   Genitourinary negative   Musculoskeletal negative   Integumentary negative   Neurological negative   Endocrine negative   Other Symptoms none, all other systems are negative     History Review: The following portions of the patient's history were reviewed and documented: allergies, current medications, past family history, past medical history, past social history and problem list      Lab Review: Labs were reviewed and discussed with patient  Laboratory Results:     LDL CHOLESTEROL, DIRECT  Order: [id number]  (suggestion)  Information displayed in this report will not trend or trigger automated decision support      Ref Range & Units 3/2/21  7:27 AM   LDL CHOL, Direct <130 mg/dL 59    Specimen Collected: 03/02/21  7:27 AM Last Resulted: 03/02/21 12:34 PM   Received From: [facility]  Result Received: 03/04/21  7:56 AM VITAMIN D, 25-OH  Order: [id number]  (suggestion)  Information displayed in this report will not trend or trigger automated decision support  Ref Range & Units 3/2/21  7:27 AM   Vitamin D, 25-OH 30 - 100 ng/mL 32    Comment: Interpretive information: Total Vitamin D, 25-Hydroxy                                              This assay quantifies the sum of vitamin D3,   25-hydroxy and vitamin D2, 25-hydroxy                                                 <10    ng/mL  Deficiency     10-30  ng/mL  Insufficiency      ng/mL  Sufficiency     >100   ng/mL  Toxicity   Specimen Collected: 03/02/21  7:27 AM Last Resulted: 03/02/21  2:43 PM   Received From: [address] [place] [address]  Result Received: 03/04/21  7:56 AM     TSH, 3RD GENERATION  Order: [id number]  (suggestion)  Information displayed in this report will not trend or trigger automated decision support  Ref Range & Units 3/2/21  7:27 AM   Thyroid Stimulating Hormone 0 36 - 3 74 uIU/mL 4  37High     Specimen Collected: 03/02/21  7:27 AM Last Resulted: 03/02/21 12:30 PM   Received From: [id number] [place] [address]  Result Received: 03/04/21  7:56 AM        LIPID PANEL  Order: [id number]  (suggestion)  Information displayed in this report will not trend or trigger automated decision support  Ref Range & Units 3/2/21  7:27 AM   Cholesterol <200 mg/dL 157    Triglyceride <150 mg/dL 116    Cholesterol, HDL, Direct >40 mg/dL 76    Cholesterol, Non-HDL <160 mg/dL 81    Comment: Note: For NCEP interpretive guidelines please refer to the Laboratory Handbook  Cholesterol, LDL, Calculated <130 mg/dL 58    Comment: LDL Cholesterol was calculated using the Friedewald equation  Direct measurement of LDL is not indicated for this patient based on [facility]'s analytical algorithm for measurement of LDL Cholesterol     CHOL/HDL Ratio   2 07    Comment: Relative Risk   1/2 Average Risk          3 27   Average Risk              4 44   2X Average Risk           7 05   3X Average Risk          11 04   Specimen Collected: 03/02/21  7:27 AM Last Resulted: 03/02/21 12:30 PM   Received From: [facility]  Result Received: 03/04/21  7:56 AM        COMPREHENSIVE METABOLIC PANEL  Order: [id number]  (suggestion)  Information displayed in this report will not trend or trigger automated decision support  Ref Range & Units 3/2/21  7:27 AM   Glucose 65 - 99 mg/dL 79    BUN 7 - 25 mg/dL 12    Creatinine 0 40 - 1 10 mg/dL 0 78    Sodium 135 - 145 mmol/L 143    Potassium 3 5 - 5 2 mmol/L 3 6    Chloride 100 - 109 mmol/L 107    Carbon Dioxide 23 - 31 mmol/L 29    Calcium 8 5 - 10 1 mg/dL 9 4    Alkaline Phosphatase 35 - 120 U/L 84    Albumin 3 5 - 4 8 g/dL 3 4Low     Bilirubin, Total 0 2 - 1 0 mg/dL 0 3    Comment: Use of this assay is not recommended for patients undergoing treatment with eltrombopag due to the potential for falsely elevated results  Protein, Total 6 3 - 8 3 g/dL 6 6    AST <41 U/L 22    ALT <56 U/L 64High     Anion Gap 3 - 11  7    eGFR, Non- >60  105    eGFR, African American >60  122    eGFR Comment   Units: mL/min per 1 73 square meters    Comment:                                            Normal Function or Mild Renal     Disease (if clinically at risk):  >or=60   Moderately Decreased:                30-59   Severely Decreased:                  15-29   Renal Failure:                         <15                                              Please note that the eGFR is based on the CKD-EPI calculation, and is not intended to be used for drug dosing                                               Note: Calculated GFR may not be an accurate indicator of renal function if the patient's renal function is not in a steady state     Specimen Collected: 03/02/21  7:27 AM Last Resulted: 03/02/21 12:30 PM   Received From: [facility]  Result Received: 03/04/21  7:56 AM   TIBC PROFILE: IRON AND TIBC  Order: [id number]  (suggestion) Information displayed in this report will not trend or trigger automated decision support  Ref Range & Units 3/2/21  7:26 AM   Iron 40 - 180 ug/dL 69    Transferrin 200 - 400 mg/dL 379    % Saturation 20 - 50 % 15Low     TIBC 260 - 430 ug/dL 460High     Comment: Note: Results obtained for the Total Iron Binding Capacity (TIBC) are based on a calculation derived from the direct determination of Iron and Transferrin, and a calculated % saturation  Specimen Collected: 03/02/21  7:26 AM Last Resulted: 03/02/21 12:47 PM   Received From: [id number]  Result Received: 03/04/21  7:56 AM    Received Information     FERRITIN  Order: [id number]  (suggestion)  Information displayed in this report will not trend or trigger automated decision support  Ref Range & Units 3/2/21  7:26 AM   Ferritin 10 - 291 ng/mL <4Low     Specimen Collected: 03/02/21  7:26 AM Last Resulted: 03/02/21 12:47 PM   Received From: [id number]  Result Received: 03/04/21  7:56 AM     Contains abnormal data CBC AND DIFFERENTIAL  Order: [id number]  (suggestion)  Information displayed in this report will not trend or trigger automated decision support      Ref Range & Units 3/2/21  7:26 AM   Hemoglobin 11 5 - 14 5 g/dL 11 8    Hematocrit 35 0 - 43 0 % 35 2    WBC 4 0 - 10 0 thou/cmm 7 9    RBC 3 70 - 4 70 mill/cmm 4 50    Platelet Count 660 - 350 thou/cmm 353High     MPV 7 5 - 11 3 fL 8 9    MCV 80 - 100 fL 78Low     MCH 26 0 - 34 0 pg 26 3    MCHC 32 0 - 37 0 g/dL 33 6    RDW 12 0 - 16 0 % 16 5High     Differential Type   AUTO    Absolute Neutrophils 1 8 - 7 8 thou/cmm 3 5    Absolute Lymphocytes 1 0 - 3 0 thou/cmm 3 2High     Absolute Monocytes 0 3 - 1 0 thou/cmm 0 7    Absolute Eosinophils 0 0 - 0 5 thou/cmm 0 4    Absolute Basophils 0 0 - 0 1 thou/cmm 0 1    Neutrophils  % 43    Lymphocytes  % 41    Monocytes  % 9    Eosinophils  % 6    Basophils  % 1    Specimen Collected: 03/02/21  7:26 AM Last Resulted: 03/02/21 12:06 PM   Received From: [facility]  Result Received: 03/04/21  7:56 AM           OBJECTIVE:     Vital signs in last 24 hours:    Vitals:    04/20/21 1855   Weight: 60 3 kg (133 lb)   Height: 5' (1 524 m)       Mental Status Evaluation:    Appearance unable to assess today due to virtual visit   Behavior cooperative, calm   Speech normal rate, normal volume, normal pitch   Mood euthymic   Affect unable to assess today due to virtual visit   Thought Processes organized, goal directed, linear   Associations intact associations   Thought Content no overt delusions   Perceptual Disturbances: no auditory hallucinations, no visual hallucinations   Abnormal Thoughts  Risk Potential Suicidal ideation - None  Homicidal ideation - None  Potential for aggression - No   Orientation oriented to person, place, time/date and situation   Memory recent and remote memory grossly intact   Consciousness alert and awake   Attention Span Concentration Span attention span and concentration are age appropriate   Intellect appears to be of average intelligence   Insight intact   Judgement intact   Muscle Strength and  Gait unable to assess today due to virtual visit   Motor activity unable to assess today due to virtual visit   Pain none   Pain Scale 0       Risks, Benefits And Possible Side Effects Of Medications:    AGREE: Risks, benefits, and possible side effects of medications explained to [facility] and she (or legal representative) verbalizes understanding and agreement for treatment  PREGNANCY: Risks related to Pregnancy or becoming pregnant discussed related to medications and treatment   Patient has agreed to discuss treatment if planning to become pregnant, or if they become pregnant    Controlled Medication Discussion:     Not applicable  ______________________________________________________________        Past Psychiatric History, Social History, Family Psychiatric History, Substance Abuse History, and Traumatic History copied from my note, Gildardo Merced, dated 08/26/2020  Past Psychiatric History:      Past Inpatient Psychiatric Treatment:   One past inpatient psychiatric admission at [facility] in 2011 during 1st year of college while in [facility]  Admitted for paranoia and bizzarre behavior of shredding paper with her fingers (she does not have a clear memory of what happened)  Past Outpatient Psychiatric Treatment:    2011 Therapist Benito Youssef and malika Short left Dominic Damon in [facility]) & psychiatrist Dr Troy Kaye 2011; Radha at [facility] 2019 and now Starling Showers at [facility]  Past Suicide Attempts: no  Past Violent Behavior: no  Past Psychiatric Medication Trials: Risperdal and Abilify     Traumatic History:      Abuse: no history of physical or sexual abuse, positive history of physical abuse, no history of emotional abuse, no history of verbal abuse  Other Traumatic Events: none      Family Psychiatric History:      Suicide Attempts or Completions: denies  Substance Abuse: denies  Denies family history of psychiatric illness     Substance Use History:     Cigarettes:  Never Used  Vap/E Cigarettes: Never  Smokeless Tobacco: Never  Alcohol: None (wedding a sip)  Illicit Substances: Never  Caffeine: No coffee, soda 1 8 oz q 2 weeks    Social History:  Education level: in [employer] at [employer] (Austrian and Social Studies)  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)  Marital status: never   Children: none  Current Living Situation: the patient currently lives in a house with roommates (rent)     Social support: mom Bakari Garza and oldest brother Martin Shanks: Maritza Vargasr experience: None  Legal history: Denies  Access to Guns: Denies     Past Medical History:     Concussion:  denies  Seizures: denies     Past Medical History:    Past Medical History:   Diagnosis Date    Acid reflux     Schizoaffective disorder [facility])      Past Medical History Pertinent Negatives:   Diagnosis Date Noted    Disease of thyroid gland 08/26/2020    Head injury 08/26/2020    Seizures ([address] ) 08/26/2020     Past Surgical History:   Procedure Laterality Date    CHOLECYSTECTOMY LAPAROSCOPIC N/A 11/17/2020    Procedure: Laparoscopic cholecystectomy;  Surgeon: Maggie Spatz, MD;  [facility];  Service: General     Allergies   Allergen Reactions    Other      Seasonal allergy         Substance Abuse History:    Social History     Substance and Sexual Activity   Alcohol Use Not Currently    Comment: sip at a wedding     Social History     Substance and Sexual Activity   Drug Use Never       Social History:    Social History     Socioeconomic History    Marital status: Single     Spouse name: Not on file    Number of children: 0    Years of education: Not on file    Highest education level:  Bachelor's degree (e g , BA, AB, BS)   Occupational History    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[employer] [address] resource strain: Not on file    Food insecurity     Worry: Not on file     Inability: Not on file   [employer] needs     Medical: Not on file     Non-medical: Not on file   Tobacco Use    Smoking status: Never Smoker    Smokeless tobacco: Never Used   Substance and Sexual Activity    Alcohol use: Not Currently     Comment: sip at a wedding    Drug use: Never    Sexual activity: Not Currently     Birth control/protection: OCP   Lifestyle    Physical activity     Days per week: 0 days     Minutes per session: 0 min    Stress: Not on file   Relationships    Social connections     Talks on phone: Not on file     Gets together: Not on file     Attends Episcopal service: Not on file     Active member of club or organization: Not on file     Attends meetings of clubs or organizations: Not on file     Relationship status: Not on file    Intimate partner violence     Fear of current or ex partner: No Emotionally abused: No     Physically abused: No     Forced sexual activity: No   Other Topics Concern    Not on file   Social History Narrative    Not on file       Family Psychiatric History:     Family History   Problem Relation Age of Onset    Hypertension Mother     No Known Problems Brother     Alzheimer's disease Maternal Grandfather     Hypertension Maternal Grandmother     Dialysis Maternal Grandmother     No Known Problems Brother     No Known Problems Brother        ____________________________________________________________________    Confidential Assessment:    Confidential assessment copied from my note, Debbie Loaiza, dated 08/26/2020  Geri this 57-year-old female who was born and raised until the age of 8 or 7 in [place] by her mother and biological father who passed away from an unknown disease  She states her mother does not discuss it and she states [place], he got sick and they were not able to save him my mom does not talk about it    She reports that her mother moved to the [facility] to live with her mother and sister(patient's grandmother an aunt) and patient and her older brother and 2 younger brothers were left behind in [place] with their mother's best friend  The 3 children were eventually brought to the [facility] 1 at a time as their mother could afford it  She denies any abuse growing up  She reports meeting all milestones and she was in honors classes in high school and graduated on time  She states that she did not have a lot of friends in general as she was quiet however she loved her studies      She states in her senior year of high school she joined [employer] and track  She states this is when she started to have decreased sleep    She states that she was in honors courses, she was very involved in the to clubs as well as she loved reading and she would stay up at night focusing on Anime books, [employer], comic, videos etc she states she was stable on the computer for hours and she was getting approximately 2 hours of sleep per night      Patient states that she started falling asleep in class toward the end of the year and 1 of her teachers approach her about this was going to get her mental health help however she graduated before this occurred  She states that her behaviors of staying up all night on the computer and having obsessive thoughts about Anime and Manga and the fantasy world continued over the summer she continued only have approximately 2 hours of sleep per night  She was a commuter student back and forth to [employer]  She states that she never miss classes, she was a biology major and she was attempting to memorize information in her biology books word for word however she was unable to master this  She reports the stress of this along with her lack of sleep was too much    She states her mother was attempting to obtain a counselor for her as she was very stressed out      Geri states 1 day (2011, Freshman year 1st semester at [employer]) she did not go to school and she refers to this as the [employer] "  She states, "I was just sitting there shredding paper with my hands for 8 hours straight, my family was trying to help me and I was screaming at them and threatening them and I would not let them help me and they tried to reach a counselor and they finally had a call to have me taken away    She reports this is her only inpatient admission to [facility]  She reports at this time she was extremely paranoid and she believes she was having auditory hallucinations at this time as well however she cannot recall much from this "episode "       Since 2011 she has been under the care of therapists and Psychiatry however this provider does not have documents or records to review  Patient states she was initiated on Risperdal however she did not like how she felt on this and she did not feel that it worked  She was only on for approximately 2 weeks and then discontinued and initiated on Abilify  She states the highest dose of Abilify was 25 mg which was maintained for some time  She denies any side effects to Abilify however she has been slowly titrated down over the years and she has been on approximately 5 mg for the last 2 years without issue and she has not had significant paranoia for the last 1 and half-2 years     She was given a diagnosis of schizoaffective bipolar type  At the time in 2011 it sounds as though she may have met the criteria as she was staying up for days on and she reports having at least a minimum of 72 hours of being wake and not missing her sleep, she reports being hyper, being distracted being irritable having appetite changes, having psychomotor agitation and having significant increased goal-directed activity  Of note she states this did build up over time in the majority of these behaviors occurred after her significant lack of sleep (meaning the irritability, agitation, psychomotor agitation, appetite changes)  She reports her increased goal-directed activity or her obsessive thoughts about the Anime and comics are what led to her lack of sleep  She then experienced psychosis while she was up for approximately 72 hours as well as significant irritability and agitation during that same period of time  After the episode she reports having significant difficulty with self-care which included challenges with showering, brushing her teeth, feeling down, decreased appetite  She reports Abilify helped all of these symptoms and she has been well since that time  She continues in psychotherapy    This provider feels as though she did have obsessive behaviors and this should be monitored        08/26/2020:patient gave this provider verbal release of information to review records from [facility], [place] [facility] and [facility]LE[facility] in other words all records in the [facility] [employer] [facility] to review labs, records and EKGs  This provider was only able to find labs from 2011 and patient will need to have new labs obtained  Scales:  No new scales today       Assessment/Plan:       Diagnoses and all orders for this visit:    Schizoaffective disorder, bipolar type (HCC)  -     benztropine (COGENTIN) 0 5 mg tablet; Take 1 tablet (0 5 mg total) by mouth 2 (two) times a day  -     ARIPiprazole (ABILIFY) 10 mg tablet; Take 1/2 tab (5mg) PO daily    Adjustment disorder, unspecified type    Muscle spasm  -     benztropine (COGENTIN) 0 5 mg tablet; Take 1 tablet (0 5 mg total) by mouth 2 (two) times a day    Benign essential hypertension  -     ARIPiprazole (ABILIFY) 10 mg tablet; Take 1/2 tab (5mg) PO daily    Other orders  -     FeroSul 325 (65 Fe) MG tablet; Take 1 tablet by mouth daily with breakfast  -     ferrous sulfate 325 (65 Fe) mg tablet; Take 325 mg by mouth 2 (two) times a day  -     norgestimate-ethinyl estradiol (ORTHO TRI-CYCLEN LO) 0 18/0 215/0 25 MG-25 MCG per tablet; Take 1 tablet by mouth daily          Treatment Recommendations/Precautions/Plan:    Geri  Has been doing well overall since last visit, she states that 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[place] however she has had her 1st vaccine and she states things are going well in the classroom  She continues have stressors related to being in school herself and balancing that along with her full-time job as a [employer] and   She states that Abilify continues to maintain controlled of any paranoid ideation and any hallucinations she also has very good control of mood she has not had any agitation irritability or anger outbursts  She feels as though her mood is in a good place  She denies any suicidal or homicidal ideation, she denies any auditory visual hallucinations, she denies any anxiety or significant depressive symptoms        She is now being treated for low iron levels she is on iron replacement therapy 325 mg p o  b i d  and she states that since initiating this her muscle waves that she was experiencing have been decreasing  We may be able to decrease Cogentin dosing after she has been stabilized for a longer period of time on iron replacement  She should be having repeat iron levels in approximately 1 month or so  When she sees this provider again on June 29th we will discuss decreasing Cogentin to 1 time daily dosing if her muscle waves are resolved and her iron levels are in a good place  In other words the muscle waves may not be side effect of Abilify and may have been a side effect of low iron levels and she may have been experiencing muscle spasms  Will continue to follow and consider decrease of Cogentin at next visit  The goal would be to minimize number of medications if possible  For this moment in time we will maintain all current medication dosing as scheduled  Patient denies any side effects  Patient has been educated about their diagnosis and treatment modalities  They voiced understanding and agreement with the following plan:    -   Labs reviewed today, patient continues to follow with PCP and specialists  She is now on iron replacement  ALT continues to get monitored as it is coming down  No additional labs ordered at today's visit  -  Continue Cogentin 0 5 mg p o  b i d  for continued improvement in what is possibly EPS however at this point we now may be thinking that she is having these "muscle waves" from low iron count  She states since starting iron replacement therapy this sensation of muscle waves is decreasing significantly  At next visit in June will consider decreasing Cogentin dosing to 1 time daily  If at the next office visit after that she is doing well and not having any significant  Symptoms are muscle waves will completely discontinue Cogentin at that time to minimize medication burden    At this 0 3 30 day supply with 2 refills sent to pharmacy 04/20/2021      -   Continue Abilify 5 mg p o  daily for continued improvement in schizoaffective bipolar disorder, mood stabilization and management of symptoms of irritability/agitation  60 day supply with 2 refills sent to pharmacy 04/20/2021      -  Continue therapy with [employer] herman at [address]    -followup with this provider  June 29, 2021 at noon    Continue to follow with primary care physician for routine medical care, routine yearly lab work and any other medical concerns     -Patient will call if issues or concerns     -Discussed self monitoring of symptoms, and symptom monitoring tools     -Patient has been informed of 24 hours and weekend coverage for urgent situations accessed by calling the main clinic phone number      [facility] Crisis Telephone Numbers and the National Suicide Prevention Hotline Number Provided to Patient     -Treatment Plan with [employer] stone psychotherapist at [facility]    Psychotherapy Provided:       Individual psychotherapy provided: Yes  Counseling was provided during the session today for 20 minutes  Medications, treatment progress and treatment plan reviewed with Geri  Medication changes discussed with Geri  Medication education provided to [facility]  Recent stressor including COVID-19 issues, job stress, school stress, medical problems and ongoing anxiety discussed with Geri  Coping strategies reviewed with Geri  Importance of medication and treatment compliance reviewed with Geri  Reassurance and supportive therapy provided  Crisis/safety plan discussed with Geri  This note was shared with patient         MEHNAZ Euceda 04/20/21

## 2021-04-30 ENCOUNTER — TELEPHONE (OUTPATIENT)
Dept: BEHAVIORAL/MENTAL HEALTH CLINIC | Facility: CLINIC | Age: 26
End: 2021-04-30

## 2021-04-30 NOTE — TELEPHONE ENCOUNTER
No charge-unexpected work conflict     ----- Message from [address] sent at 4/30/2021  3:54 PM EDT -----  Regarding: Amita 4/30  Pt called at 3:53 to cx her 4:00 appt due to a training at work taking longer than expected

## 2021-05-13 ENCOUNTER — IMMUNIZATIONS (OUTPATIENT)
Dept: FAMILY MEDICINE CLINIC | Facility: HOSPITAL | Age: 26
End: 2021-05-13

## 2021-05-13 DIAGNOSIS — Z23 ENCOUNTER FOR IMMUNIZATION: Primary | ICD-10-CM

## 2021-05-13 PROCEDURE — 0002A SARS-COV-2 / COVID-19 MRNA VACCINE (PFIZER-BIONTECH) 30 MCG: CPT

## 2021-05-13 PROCEDURE — 91300 SARS-COV-2 / COVID-19 MRNA VACCINE (PFIZER-BIONTECH) 30 MCG: CPT

## 2021-06-21 ENCOUNTER — TELEPHONE (OUTPATIENT)
Dept: PSYCHIATRY | Facility: CLINIC | Age: 26
End: 2021-06-21

## 2021-06-21 NOTE — TELEPHONE ENCOUNTER
----- Message from Poornima Maria sent at 6/21/2021  1:21 PM EDT -----  Regarding: Patient No Show  Jessenia Dill [[id number]]  No Showed 06/21/21  for Aliyah Seymour LCSW and did not call with proper notice to Cx appt   They are marked as a No Show for today's visit

## 2021-06-23 ENCOUNTER — TELEPHONE (OUTPATIENT)
Dept: BEHAVIORAL/MENTAL HEALTH CLINIC | Facility: CLINIC | Age: 26
End: 2021-06-23

## 2021-06-23 NOTE — TELEPHONE ENCOUNTER
NO-SHOW LETTER MAILED TO [place] [employer]    ADDRESS: [address] [facility] [address]

## 2021-06-29 ENCOUNTER — TELEPHONE (OUTPATIENT)
Dept: PSYCHIATRY | Facility: CLINIC | Age: 26
End: 2021-06-29

## 2021-06-29 NOTE — TELEPHONE ENCOUNTER
----- Message from Dagoberto Simeon sent at 6/29/2021  3:59 PM EDT -----  Regarding: Patient No Show  Antoine Floyd [[id number]]  No Showed 06/29/21  for Mayford Lanes, CRNP and did not call with proper notice to Cx appt   They are marked as a No Show for today's visit

## 2021-06-30 ENCOUNTER — TELEPHONE (OUTPATIENT)
Dept: PSYCHIATRY | Facility: CLINIC | Age: 26
End: 2021-06-30

## 2021-07-02 ENCOUNTER — TELEMEDICINE (OUTPATIENT)
Dept: BEHAVIORAL/MENTAL HEALTH CLINIC | Facility: CLINIC | Age: 26
End: 2021-07-02
Payer: COMMERCIAL

## 2021-07-02 DIAGNOSIS — F43.20 ADJUSTMENT DISORDER, UNSPECIFIED TYPE: Primary | ICD-10-CM

## 2021-07-02 DIAGNOSIS — F25.0 SCHIZOAFFECTIVE DISORDER, BIPOLAR TYPE (HCC): ICD-10-CM

## 2021-07-02 PROCEDURE — 90834 PSYTX W PT 45 MINUTES: CPT | Performed by: PSYCHIATRY & NEUROLOGY

## 2021-07-02 NOTE — PSYCH
Virtual Regular Visit  Session time 2779-5713 (total time 43 minutes)    Assessment/Plan:    Problem List Items Addressed This Visit        Other    Adjustment disorder - Primary    Schizoaffective disorder, bipolar type ([facility] [address] )          Goals addressed in session: Goal 1 and Goal 2          Reason for visit is   Chief Complaint   Patient presents with    Virtual Regular Visit        Encounter provider OFE Iraheta    Provider located at [address] [place] [id number] [phone]      Recent Visits  No visits were found meeting these conditions  Showing recent visits within past 7 days and meeting all other requirements  Future Appointments  No visits were found meeting these conditions  Showing future appointments within next 150 days and meeting all other requirements       The patient was identified by name and date of birth  Ronnie La was informed that this is a telemedicine visit and that the visit is being conducted through [address] [facility] and patient was informed that this is a secure, HIPAA-compliant platform  She agrees to proceed     My office door was closed  No one else was in the room  She acknowledged consent and understanding of privacy and security of the video platform  The patient has agreed to participate and understands they can discontinue the visit at any time  Patient is aware this is a billable service  Subjective  Ronnie La is a 30 y o  female presenting for follow up  Kimberley Andrade shared that she is doing well now that she has moved into a new apartment by herself  She said that living in her old place was frustrating and anxiety-provoking, partly because one of her roommates was difficult to live with and partly because neighbors were always home and "watching" her  Kimberley Andrade said that now she feels safer and likes having her own space    She continues to work on summer classes, just finishing one and having two coming up in Summer II session  She said that classes are going well, but 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  She said that she found out that there will be some [place] exchange students and 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, 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  Discussed her concerns and what worries her the most, and provided support and perspective to help ease her anxiety (suggested that the students will want to work on their [place] and not focus so much on how fluent she is in [place] etc)  Cornel Boles said that now after thinking differently she feels better about that  She talked about online dating, wanting to finally meet someone and have a relationship, but taking her time in trying to find someone that has the same values and goals as her  A: Cornel Boles presented as calm and euthymic, with a bright affect, good eye contact and calm behavior  Her speech was normal; concentration mildly impaired  Insight and judgment good  Denies SI HI and psychosis  Good progress toward goals  P: Cornel Boles will continue to use positive affirmations and thought reframing to help reduce anxiety, and will return in two weeks for follow up         HPI     Past Medical History:   Diagnosis Date    Acid reflux     Schizoaffective disorder ([facility] [address] )        Past Surgical History:   Procedure Laterality Date    CHOLECYSTECTOMY LAPAROSCOPIC N/A 11/17/2020    Procedure: Laparoscopic cholecystectomy;  Surgeon: Jacque Cockayne, MD;  [facility];  Service: General       Current Outpatient Medications   Medication Sig Dispense Refill    acetaminophen (TYLENOL) 325 mg tablet Take 2 tablets (650 mg total) by mouth every 6 (six) hours as needed (mild pain)      amLODIPine (NORVASC) 5 mg tablet       ARIPiprazole (ABILIFY) 10 mg tablet Take 1/2 tab (5mg) PO daily 30 tablet 2    benztropine (COGENTIN) 0 5 mg tablet Take 1 tablet (0 5 mg total) by mouth 2 (two) times a day 60 tablet 2    FeroSul 325 (65 Fe) MG tablet Take 1 tablet by mouth daily with breakfast      ferrous sulfate 325 (65 Fe) mg tablet Take 325 mg by mouth 2 (two) times a day      hydrochlorothiazide (HYDRODIURIL) 12 5 mg tablet       ibuprofen (MOTRIN) 200 mg tablet Take 2 tablets (400 mg total) by mouth every 6 (six) hours as needed for mild pain or headaches (Take with food ) 30 tablet 0    loratadine (CLARITIN) 10 mg tablet       Multiple Vitamin (multivitamin) tablet Take 1 tablet by mouth      norgestimate-ethinyl estradiol (ORTHO TRI-CYCLEN LO) 0 18/0 215/0 25 MG-25 MCG per tablet Take 1 tablet by mouth daily      norgestimate-ethinyl estradiol (ORTHO TRI-CYCLEN LO) 0 18/0 215/0 25 MG-25 MCG per tablet Take 1 tablet by mouth daily      omeprazole (PriLOSEC) 40 MG capsule       ondansetron (ZOFRAN-ODT) 4 mg disintegrating tablet Take 1 tablet (4 mg total) by mouth every 6 (six) hours as needed for nausea or vomiting 20 tablet 0     No current facility-administered medications for this visit  Allergies   Allergen Reactions    Other      Seasonal allergy         Review of Systems    Video Exam    There were no vitals filed for this visit  Physical Exam     I spent 43 minutes directly with the patient during this visit      VIRTUAL VISIT DISCLAIMER    Ana Laura Clemente acknowledges that she has consented to an online visit or consultation  She understands that the online visit is based solely on information provided by her, and that, in the absence of a face-to-face physical evaluation by the physician, the diagnosis she receives is both limited and provisional in terms of accuracy and completeness  This is not intended to replace a full medical face-to-face evaluation by the physician  Ana Laura Clemente understands and accepts these terms

## 2021-07-14 ENCOUNTER — TELEMEDICINE (OUTPATIENT)
Dept: BEHAVIORAL/MENTAL HEALTH CLINIC | Facility: CLINIC | Age: 26
End: 2021-07-14
Payer: COMMERCIAL

## 2021-07-14 DIAGNOSIS — F25.0 SCHIZOAFFECTIVE DISORDER, BIPOLAR TYPE (HCC): ICD-10-CM

## 2021-07-14 DIAGNOSIS — F43.20 ADJUSTMENT DISORDER, UNSPECIFIED TYPE: Primary | ICD-10-CM

## 2021-07-14 PROCEDURE — 90832 PSYTX W PT 30 MINUTES: CPT | Performed by: PSYCHIATRY & NEUROLOGY

## 2021-07-14 NOTE — PSYCH
Session time [id number] (total time 33 minutes)    Virtual Regular Visit    Verification of patient location:    Patient is currently located in the state of [place]  Patient is currently located in a state in which I am licensed      Assessment/Plan:    Problem List Items Addressed This Visit        Other    Adjustment disorder - Primary    Schizoaffective disorder, bipolar type ([facility] [address] )          Goals addressed in session: Goal 1          Reason for visit is   Chief Complaint   Patient presents with    Virtual Regular Visit        Encounter provider OFE Castillo    Provider located at [address] [place] [id number] [phone]      Recent Visits  No visits were found meeting these conditions  Showing recent visits within past 7 days and meeting all other requirements  Future Appointments  No visits were found meeting these conditions  Showing future appointments within next 150 days and meeting all other requirements       The patient was identified by name and date of birth  Amalia Stone was informed that this is a telemedicine visit and that the visit is being conducted [facility] and patient was informed that this is a secure, HIPAA-compliant platform  She agrees to proceed     My office door was closed  No one else was in the room  She acknowledged consent and understanding of privacy and security of the video platform  The patient has agreed to participate and understands they can discontinue the visit at any time  Patient is aware this is a billable service  Subjective  Amalia Stone is a 30 y o  female presenting for follow up  Satya Abraham shared that she is doing well, settling into new apartment    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 by early August   She said that she was able to go to [facility] to see her family for the 4th of July and met her niece for the first time and enjoyed herself a lot  She said that 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, 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, which she likes  She said that she has not been anxious and her mood has been good, especially since spending time with her family  She has upcoming events that she is looking forward to and is taking time to relax over the summer  She did note that she has been "lazy," not going out much at all and even ordering her groceries online and having them delivered  Discussed this pattern of behavior, her desire to have a restful summer, and trying not to be hard on herself for enjoying some rest and relaxation  A: Kathi Muniz presented as calm and generally euthymic, with good eye contact and a bright affect  Her speech and behavior were normal, concentration was intact, thought process logical and organized  Insight and judgment good  Denies sI HI and psychosis  P: Kathi Muniz will continue to focus on upcoming happy events, will take time to rest as she feels the need, and will return in two weeks for follow up        HPI     Past Medical History:   Diagnosis Date    Acid reflux     Schizoaffective disorder ([facility] [address] )        Past Surgical History:   Procedure Laterality Date    CHOLECYSTECTOMY LAPAROSCOPIC N/A 11/17/2020    Procedure: Laparoscopic cholecystectomy;  Surgeon: Jackie Nixon MD;  [facility];  Service: General       Current Outpatient Medications   Medication Sig Dispense Refill    acetaminophen (TYLENOL) 325 mg tablet Take 2 tablets (650 mg total) by mouth every 6 (six) hours as needed (mild pain)      amLODIPine (NORVASC) 5 mg tablet       ARIPiprazole (ABILIFY) 10 mg tablet Take 1/2 tab (5mg) PO daily 30 tablet 2    benztropine (COGENTIN) 0 5 mg tablet Take 1 tablet (0 5 mg total) by mouth 2 (two) times a day 60 tablet 2    FeroSul 325 (65 Fe) MG tablet Take 1 tablet by mouth daily with breakfast      ferrous sulfate 325 (65 Fe) mg tablet Take 325 mg by mouth 2 (two) times a day      hydrochlorothiazide (HYDRODIURIL) 12 5 mg tablet       ibuprofen (MOTRIN) 200 mg tablet Take 2 tablets (400 mg total) by mouth every 6 (six) hours as needed for mild pain or headaches (Take with food ) 30 tablet 0    loratadine (CLARITIN) 10 mg tablet       Multiple Vitamin (multivitamin) tablet Take 1 tablet by mouth      norgestimate-ethinyl estradiol (ORTHO TRI-CYCLEN LO) 0 18/0 215/0 25 MG-25 MCG per tablet Take 1 tablet by mouth daily      norgestimate-ethinyl estradiol (ORTHO TRI-CYCLEN LO) 0 18/0 215/0 25 MG-25 MCG per tablet Take 1 tablet by mouth daily      omeprazole (PriLOSEC) 40 MG capsule       ondansetron (ZOFRAN-ODT) 4 mg disintegrating tablet Take 1 tablet (4 mg total) by mouth every 6 (six) hours as needed for nausea or vomiting 20 tablet 0     No current facility-administered medications for this visit  Allergies   Allergen Reactions    Other      Seasonal allergy         Review of Systems    Video Exam    There were no vitals filed for this visit  Physical Exam     I spent 33 minutes directly with the patient during this visit    VIRTUAL VISIT DISCLAIMER    Loyda Tinajero verbally agrees to participate in [employer]  Pt is aware that [employer] could be limited without vital signs or the ability to perform a full hands-on physical Zakiajerry Laurie understands she or the provider may request at any time to terminate the video visit and request the patient to seek care or treatment in person

## 2021-07-28 ENCOUNTER — TELEMEDICINE (OUTPATIENT)
Dept: BEHAVIORAL/MENTAL HEALTH CLINIC | Facility: CLINIC | Age: 26
End: 2021-07-28
Payer: COMMERCIAL

## 2021-07-28 DIAGNOSIS — F43.20 ADJUSTMENT DISORDER, UNSPECIFIED TYPE: Primary | ICD-10-CM

## 2021-07-28 DIAGNOSIS — F25.0 SCHIZOAFFECTIVE DISORDER, BIPOLAR TYPE (HCC): ICD-10-CM

## 2021-07-28 PROCEDURE — 90834 PSYTX W PT 45 MINUTES: CPT | Performed by: PSYCHIATRY & NEUROLOGY

## 2021-07-28 NOTE — PSYCH
Session time 7730-8382 (total time 38 minutes)    Virtual Regular Visit    Verification of patient location:    Patient is located in the following state in which I hold an active license [place]      Assessment/Plan:    Problem List Items Addressed This Visit        Other    Adjustment disorder - Primary    Schizoaffective disorder, bipolar type ([facility] [address] )          Goals addressed in session: Goal 1          Reason for visit is   Chief Complaint   Patient presents with    Virtual Regular Visit        Encounter provider OFE Araiza    Provider located at [address] [place] [id number] [phone]      Recent Visits  No visits were found meeting these conditions  Showing recent visits within past 7 days and meeting all other requirements  Future Appointments  No visits were found meeting these conditions  Showing future appointments within next 150 days and meeting all other requirements       The patient was identified by name and date of birth  Johanny Lara was informed that this is a telemedicine visit and that the visit is being conducted through[employer] and patient was informed that this is a secure, HIPAA-compliant platform  She agrees to proceed     My office door was closed  No one else was in the room  She acknowledged consent and understanding of privacy and security of the video platform  The patient has agreed to participate and understands they can discontinue the visit at any time  Patient is aware this is a billable service  Subjective  Johanny Lara is a 30 y o  female presenting for follow up  Gage Fuentes shared that she is doing well, feeling relaxed and enjoying her time off for the summer now that she is in her new apartment  She continues to work on her summer class, but should be done in a week or two    She talked about wanting to go to [employer] more and starting to go to activities there such as Bible study  She said that she likes the atmosphere and the range of ages of the Jehovah's witness  She also said that she is looking forward to seeing her family over the weekend, as she is going to her mom's in [facility] to see her brother, his wife and daughter before they move to [place]  She noted that her mood has been good and anxiety has been low  Her only concern presently is that she does not have much of a social life, does not have a lot of friends (never did), and said that she feels like she is socially awkward  Encouraged Felipa Vidal to participate in Taoism and talk to people at school to start to help her develop social skills and feel more comfortable talking to people  Also encouraged her to incorporate more of her favorite hobbies (reading Pam Momin, for example) that she used to enjoy but lost interest in for a while after her "episode "  A: Felipa Vidal presented as calm and generally euthymic, with a bright affect, good eye contact and calm behavior  Her concentration was intact, thought process logical and organized  Speech normal volume, rate and fluency  Insight and judgment good  Denies SI HI and psychosis  Good progress toward goals  P: Felipa Vidal will continue to work on being more socially active, will incorporate hobbies into her daily life, and will return for follow up in two weeks        HPI     Past Medical History:   Diagnosis Date    Acid reflux     Schizoaffective disorder ([facility] [address] )        Past Surgical History:   Procedure Laterality Date    CHOLECYSTECTOMY LAPAROSCOPIC N/A 11/17/2020    Procedure: Laparoscopic cholecystectomy;  Surgeon: Edmond Young MD;  [facility];  Service: General       Current Outpatient Medications   Medication Sig Dispense Refill    acetaminophen (TYLENOL) 325 mg tablet Take 2 tablets (650 mg total) by mouth every 6 (six) hours as needed (mild pain)      amLODIPine (NORVASC) 5 mg tablet       ARIPiprazole (ABILIFY) 10 mg tablet Take 1/2 tab (5mg) PO daily 30 tablet 2    benztropine (COGENTIN) 0 5 mg tablet Take 1 tablet (0 5 mg total) by mouth 2 (two) times a day 60 tablet 2    FeroSul 325 (65 Fe) MG tablet Take 1 tablet by mouth daily with breakfast      ferrous sulfate 325 (65 Fe) mg tablet Take 325 mg by mouth 2 (two) times a day      hydrochlorothiazide (HYDRODIURIL) 12 5 mg tablet       ibuprofen (MOTRIN) 200 mg tablet Take 2 tablets (400 mg total) by mouth every 6 (six) hours as needed for mild pain or headaches (Take with food ) 30 tablet 0    loratadine (CLARITIN) 10 mg tablet       Multiple Vitamin (multivitamin) tablet Take 1 tablet by mouth      norgestimate-ethinyl estradiol (ORTHO TRI-CYCLEN LO) 0 18/0 215/0 25 MG-25 MCG per tablet Take 1 tablet by mouth daily      norgestimate-ethinyl estradiol (ORTHO TRI-CYCLEN LO) 0 18/0 215/0 25 MG-25 MCG per tablet Take 1 tablet by mouth daily      omeprazole (PriLOSEC) 40 MG capsule       ondansetron (ZOFRAN-ODT) 4 mg disintegrating tablet Take 1 tablet (4 mg total) by mouth every 6 (six) hours as needed for nausea or vomiting 20 tablet 0     No current facility-administered medications for this visit  Allergies   Allergen Reactions    Other      Seasonal allergy         Review of Systems    Video Exam    There were no vitals filed for this visit  Physical Exam     I spent 38 minutes directly with the patient during this visit    VIRTUAL VISIT DISCLAIMER    Denise Sanders verbally agrees to participate in [employer]  Pt is aware that [employer] could be limited without vital signs or the ability to perform a full hands-on physical Oh Maldonado understands she or the provider may request at any time to terminate the video visit and request the patient to seek care or treatment in person

## 2021-08-07 DIAGNOSIS — M62.838 MUSCLE SPASM: ICD-10-CM

## 2021-08-07 DIAGNOSIS — F25.0 SCHIZOAFFECTIVE DISORDER, BIPOLAR TYPE (HCC): ICD-10-CM

## 2021-08-09 DIAGNOSIS — M62.838 MUSCLE SPASM: ICD-10-CM

## 2021-08-09 DIAGNOSIS — F25.0 SCHIZOAFFECTIVE DISORDER, BIPOLAR TYPE (HCC): ICD-10-CM

## 2021-08-09 RX ORDER — BENZTROPINE MESYLATE 0.5 MG/1
0.5 TABLET ORAL 2 TIMES DAILY
Qty: 60 TABLET | Refills: 0 | OUTPATIENT
Start: 2021-08-09 | End: 2021-11-07

## 2021-08-09 RX ORDER — BENZTROPINE MESYLATE 0.5 MG/1
0.5 TABLET ORAL 2 TIMES DAILY
Qty: 60 TABLET | Refills: 0 | Status: SHIPPED | OUTPATIENT
Start: 2021-08-09 | End: 2021-08-24 | Stop reason: SDUPTHER

## 2021-08-11 ENCOUNTER — TELEPHONE (OUTPATIENT)
Dept: PSYCHIATRY | Facility: CLINIC | Age: 26
End: 2021-08-11

## 2021-08-11 ENCOUNTER — TELEMEDICINE (OUTPATIENT)
Dept: BEHAVIORAL/MENTAL HEALTH CLINIC | Facility: CLINIC | Age: 26
End: 2021-08-11
Payer: COMMERCIAL

## 2021-08-11 DIAGNOSIS — F25.0 SCHIZOAFFECTIVE DISORDER, BIPOLAR TYPE (HCC): ICD-10-CM

## 2021-08-11 DIAGNOSIS — F43.20 ADJUSTMENT DISORDER, UNSPECIFIED TYPE: Primary | ICD-10-CM

## 2021-08-11 PROCEDURE — 90832 PSYTX W PT 30 MINUTES: CPT | Performed by: PSYCHIATRY & NEUROLOGY

## 2021-08-11 NOTE — TELEPHONE ENCOUNTER
Patient called on office at 3:55pm to inform office that she will be late to the appt due to being stuck in traffic   Patient is not sure when she will be able to get here

## 2021-08-11 NOTE — PSYCH
Session time [id number] (total time 33 minutes)    Virtual Regular Visit    Verification of patient location:    Patient is located in the following state in which I hold an active license [place]      Assessment/Plan:    Problem List Items Addressed This Visit        Other    Adjustment disorder - Primary    Schizoaffective disorder, bipolar type ([facility] [address] )          Goals addressed in session: Goal 1          Reason for visit is   Chief Complaint   Patient presents with    Virtual Regular Visit        Encounter provider OFE Garcia    Provider located at [address] [place] [id number] [phone]      Recent Visits  No visits were found meeting these conditions  Showing recent visits within past 7 days and meeting all other requirements  Today's Visits  Date Type Provider Dept   08/11/21 Telephone [facility]   08/11/21 Telemedicine [facility] [place]   Showing today's visits and meeting all other requirements  Future Appointments  No visits were found meeting these conditions  Showing future appointments within next 150 days and meeting all other requirements       The patient was identified by name and date of birth  Poornima Vasques was informed that this is a telemedicine visit and that the visit is being conducted [facility] and patient was informed that this is a secure, HIPAA-compliant platform  She agrees to proceed     My office door was closed  No one else was in the room  She acknowledged consent and understanding of privacy and security of the video platform  The patient has agreed to participate and understands they can discontinue the visit at any time  Patient is aware this is a billable service  Subjective  Poornima Vasques is a 30 y o  female presenting for follow up    Jacqueline baird that overall she has been doing well, enjoying her summer with relaxing days and doing some of her summer course work  She said that now she is starting to get a little anxious, knowing she has to prep her classes for the start of school in a couple of weeks, but she is not overly worried about it at this point  Nisha Willoughby talked about some health issues (GERD, having to watch her diet), and said that this issue has been on her mind a lot lately because it makes her feel so bad  However, she noted that she just needs to watch her diet and substitute foods so that it does not flare her GERD  Encouraged Nisha Willoughby to continue to focus on health and relaxation/anxiety reduction, so that she is prepared for start of school  A: Nisha Willoughby presented as calm and generally euthymic today, with a bright affect, good eye contact and calm behavior  Her concentration was mildly impaired, thought process circumstantial at times  Speech normal rate, volume and fluency  Good insight and judgment  Denies SI HI and psychosis  Good progress toward goals  P: Nisha Willoughby will continue to work on healthy habits (diet, exercise) and self-care, and will return in two weeks for follow up        HPI     Past Medical History:   Diagnosis Date    Acid reflux     Schizoaffective disorder ([facility] [address] )        Past Surgical History:   Procedure Laterality Date    CHOLECYSTECTOMY LAPAROSCOPIC N/A 11/17/2020    Procedure: Laparoscopic cholecystectomy;  Surgeon: Ady Herbert MD;  [facility];  Service: General       Current Outpatient Medications   Medication Sig Dispense Refill    acetaminophen (TYLENOL) 325 mg tablet Take 2 tablets (650 mg total) by mouth every 6 (six) hours as needed (mild pain)      amLODIPine (NORVASC) 5 mg tablet       ARIPiprazole (ABILIFY) 10 mg tablet Take 1/2 tab (5mg) PO daily 30 tablet 2    benztropine (COGENTIN) 0 5 mg tablet Take 1 tablet (0 5 mg total) by mouth 2 (two) times a day 60 tablet 0    FeroSul 325 (65 Fe) MG tablet Take 1 tablet by mouth daily with breakfast      ferrous sulfate 325 (65 Fe) mg tablet Take 325 mg by mouth 2 (two) times a day      hydrochlorothiazide (HYDRODIURIL) 12 5 mg tablet       ibuprofen (MOTRIN) 200 mg tablet Take 2 tablets (400 mg total) by mouth every 6 (six) hours as needed for mild pain or headaches (Take with food ) 30 tablet 0    loratadine (CLARITIN) 10 mg tablet       Multiple Vitamin (multivitamin) tablet Take 1 tablet by mouth      norgestimate-ethinyl estradiol (ORTHO TRI-CYCLEN LO) 0 18/0 215/0 25 MG-25 MCG per tablet Take 1 tablet by mouth daily      norgestimate-ethinyl estradiol (ORTHO TRI-CYCLEN LO) 0 18/0 215/0 25 MG-25 MCG per tablet Take 1 tablet by mouth daily      omeprazole (PriLOSEC) 40 MG capsule       ondansetron (ZOFRAN-ODT) 4 mg disintegrating tablet Take 1 tablet (4 mg total) by mouth every 6 (six) hours as needed for nausea or vomiting 20 tablet 0     No current facility-administered medications for this visit  Allergies   Allergen Reactions    Other      Seasonal allergy         Review of Systems    Video Exam    There were no vitals filed for this visit  Physical Exam     I spent 33 minutes directly with the patient during this visit    VIRTUAL VISIT DISCLAIMER    Sailaja Shannon verbally agrees to participate in [employer]  Pt is aware that [employer] could be limited without vital signs or the ability to perform a full hands-on physical Obinna Jewell understands she or the provider may request at any time to terminate the video visit and request the patient to seek care or treatment in person

## 2021-08-24 ENCOUNTER — TELEMEDICINE (OUTPATIENT)
Dept: PSYCHIATRY | Facility: CLINIC | Age: 26
End: 2021-08-24
Payer: COMMERCIAL

## 2021-08-24 VITALS — BODY MASS INDEX: 27.48 KG/M2 | WEIGHT: 140 LBS | HEIGHT: 60 IN

## 2021-08-24 DIAGNOSIS — I10 BENIGN ESSENTIAL HYPERTENSION: ICD-10-CM

## 2021-08-24 DIAGNOSIS — F25.0 SCHIZOAFFECTIVE DISORDER, BIPOLAR TYPE (HCC): Primary | ICD-10-CM

## 2021-08-24 DIAGNOSIS — M62.838 MUSCLE SPASM: ICD-10-CM

## 2021-08-24 DIAGNOSIS — F43.20 ADJUSTMENT DISORDER, UNSPECIFIED TYPE: ICD-10-CM

## 2021-08-24 PROCEDURE — 99214 OFFICE O/P EST MOD 30 MIN: CPT | Performed by: NURSE PRACTITIONER

## 2021-08-24 RX ORDER — ARIPIPRAZOLE 10 MG/1
TABLET ORAL
Qty: 30 TABLET | Refills: 2 | Status: SHIPPED | OUTPATIENT
Start: 2021-08-24 | End: 2022-01-04 | Stop reason: SDUPTHER

## 2021-08-24 RX ORDER — BENZTROPINE MESYLATE 0.5 MG/1
0.5 TABLET ORAL 2 TIMES DAILY
Qty: 60 TABLET | Refills: 2 | Status: SHIPPED | OUTPATIENT
Start: 2021-08-24 | End: 2021-08-26

## 2021-08-24 NOTE — PSYCH
Virtual Regular Visit    Name and Date of Birth:  Leslie Whatley 30 y o  1995  MRN: [id number]    Date of Visit:   August 24, 2021    Verification of patient location:    Patient is located in the following state in which I hold an active license [place]      Assessment/Plan:    Problem List Items Addressed This Visit        Cardiovascular and Mediastinum    Benign essential hypertension    Relevant Medications    ARIPiprazole (ABILIFY) 10 mg tablet       Other    Adjustment disorder    Relevant Medications    ARIPiprazole (ABILIFY) 10 mg tablet    Schizoaffective disorder, bipolar type ([facility] ) - Primary    Relevant Medications    benztropine (COGENTIN) 0 5 mg tablet    ARIPiprazole (ABILIFY) 10 mg tablet    Muscle spasm    Relevant Medications    benztropine (COGENTIN) 0 5 mg tablet          Reason for visit is   Chief Complaint   Patient presents with    Virtual Regular Visit    Sleeping Problem    Anxiety    Depression    Follow-up    Mood Swings        Encounter provider Stacy Menjivar [facility]    Provider located at [address] [address] [phone]      Recent Visits  No visits were found meeting these conditions  Showing recent visits within past 7 days and meeting all other requirements  Today's Visits  Date Type Provider Dept   08/24/21 Telemedicine Doyle Menjivar [facility] today's visits and meeting all other requirements  Future Appointments  No visits were found meeting these conditions  Showing future appointments within next 150 days and meeting all other requirements       The patient was identified by name and date of birth  Leslie Whatley was informed that this is a telemedicine visit and that the visit is being [facility] and patient was informed that this is a secure, HIPAA-compliant platform  She agrees to proceed     My office door was closed  No one else was in the room  She acknowledged consent and understanding of privacy and security of the video platform  The patient has agreed to participate and understands they can discontinue the visit at any time  Patient is aware this is a billable service         Past Medical History:   Diagnosis Date    Acid reflux     Schizoaffective disorder ([address] )        Past Surgical History:   Procedure Laterality Date    CHOLECYSTECTOMY LAPAROSCOPIC N/A 11/17/2020    Procedure: Laparoscopic cholecystectomy;  Surgeon: Linnea Elizondo MD;  [facility];  Service: General       Current Outpatient Medications   Medication Sig Dispense Refill    acetaminophen (TYLENOL) 325 mg tablet Take 2 tablets (650 mg total) by mouth every 6 (six) hours as needed (mild pain)      amLODIPine (NORVASC) 5 mg tablet       ARIPiprazole (ABILIFY) 10 mg tablet Take 1/2 tab (5mg) PO daily 30 tablet 2    benztropine (COGENTIN) 0 5 mg tablet Take 1 tablet (0 5 mg total) by mouth 2 (two) times a day 60 tablet 2    FeroSul 325 (65 Fe) MG tablet Take 1 tablet by mouth daily with breakfast      ferrous sulfate 325 (65 Fe) mg tablet Take 325 mg by mouth 2 (two) times a day      hydrochlorothiazide (HYDRODIURIL) 12 5 mg tablet       loratadine (CLARITIN) 10 mg tablet       Multiple Vitamin (multivitamin) tablet Take 1 tablet by mouth      norgestimate-ethinyl estradiol (ORTHO TRI-CYCLEN LO) 0 18/0 215/0 25 MG-25 MCG per tablet Take 1 tablet by mouth daily      omeprazole (PriLOSEC) 40 MG capsule       ondansetron (ZOFRAN-ODT) 4 mg disintegrating tablet Take 1 tablet (4 mg total) by mouth every 6 (six) hours as needed for nausea or vomiting 20 tablet 0    ibuprofen (MOTRIN) 200 mg tablet Take 2 tablets (400 mg total) by mouth every 6 (six) hours as needed for mild pain or headaches (Take with food ) 30 tablet 0    norgestimate-ethinyl estradiol (ORTHO TRI-CYCLEN LO) 0 18/0 215/0 25 MG-25 MCG per tablet Take 1 tablet by mouth daily       No current facility-administered medications for this visit  Allergies   Allergen Reactions    Other      Seasonal allergy         Video Exam    Vitals:    08/24/21 1527   Weight: 63 5 kg (140 lb)   Height: 5' (1 524 m)         I spent 25 minutes directly with the patient during this visit    VIRTUAL VISIT DISCLAIMER    Nathanael Siddiqui verbally agrees to participate in [employer]  Pt is aware that [employer] could be limited without vital signs or the ability to perform a full hands-on physical Daralene Srivastava understands she or the provider may request at any time to terminate the video visit and request the patient to seek care or treatment in person  SUBJECTIVE:    Lore Clay is seen today for a follow up for Schizoaffective bipolar type, adjustment disorder, sleep disturbance  Since our last visit, overall symptoms have been stable  Lore Clay states she has been good from a mood standpoint  She states classes tonight again at [employer] and 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  She has not been having any paranoid ideation, denies any auditory visual hallucinations, denies delusional thinking  Feels though energy slightly though motivation are good overall  Appetite is good        HPI ROS:             ('was' notes: recent => remote)  Medication Side Effects:  denies   (Was  denies)   Depression (10 worst): 0 (Was  0-3)   Anxiety (10 worst): 0-3 (Was  0)   Safety concerns (SI, HI, etc): denies (Was  denies)   Hallucinations/Delusions denies (Was  denies)   Sleep: 8 hours per night, no NM (Was  5-8 hours per night minimum, no nightmares)   Energy: Energy low, good motivation (Was  Low energy, good motivation feels this is from low iron levels however this is improving with iron replacement)   Appetite: good (Was  good)   Height  5 ft 0 in (Was  5 ft 0 in)   Weight Change: 140 lbs pt reported (Was  133 lb patient reported)     Geri denies any side effects from medications unless noted above    Review Of Systems:      Constitutional negative   ENT negative   Cardiovascular negative   Respiratory negative   Gastrointestinal constipation   Genitourinary negative   Musculoskeletal negative   Integumentary negative   Neurological negative   Endocrine negative   Other Symptoms none, all other systems are negative     History Review: The following portions of the patient's history were reviewed and documented: allergies, current medications, past family history, past medical history, past social history and problem list      Lab Review: Labs were reviewed and discussed with patient  Laboratory Results: COMPREHENSIVE METABOLIC PANEL  Order: [id number]  (suggestion)  Result Information displayed in this report will not trend and may not trigger automated decision support  Ref Range & Units 8/4/21 10:42 AM   Glucose 65 - 99 mg/dL 79        BUN 7 - 25 mg/dL 15        Creatinine 0 40 - 1 10 mg/dL 0 78        Sodium 135 - 145 mmol/L 141        Potassium 3 5 - 5 2 mmol/L 3 7        Chloride 100 - 109 mmol/L 107        Carbon Dioxide 23 - 31 mmol/L 26        Calcium 8 5 - 10 1 mg/dL 9 4        Alkaline Phosphatase 35 - 120 U/L 92        Albumin 3 5 - 4 8 g/dL 3 6        Bilirubin, Total 0 2 - 1 0 mg/dL 0 3    Comment: Use of this assay is not recommended for patients undergoing treatment with eltrombopag due to the potential for falsely elevated results         Protein, Total 6 3 - 8 3 g/dL 6 8        AST <41 U/L 14        ALT <56 U/L 34        Anion Gap 3 - 11  8        eGFR, Non- >60  105        eGFR,  >60  122        eGFR Comment   Units: mL/min per 1 73 square meters    Comment:                                            Normal Function or Mild Renal     Disease (if clinically at risk):  >or=60   Moderately Decreased:                30-59   Severely Decreased:                  15-29   Renal Failure:                         <15                                              Please note that the eGFR is based on the CKD-EPI calculation, and is not intended to be used for drug dosing                                               Note: Calculated GFR may not be an accurate indicator of renal function if the patient's renal function is not in a steady state  Specimen Collected: 08/04/21 10:42 AM Last Resulted: 08/04/21  5:15 PM   Received From: [employer]  Result Received: 08/11/21  7:10 AM     TSH, 3RD GENERATION  Order: [id number]  (suggestion)  Result Information displayed in this report will not trend and may not trigger automated decision support      Ref Range & Units 8/4/21 10:42 AM   Thyroid Stimulating Hormone 0 36 - 3 74 uIU/mL 3 01        Specimen Collected: 08/04/21 10:42 AM Last Resulted: 08/04/21  5:15 PM   Received From: [facility]  Result Received: 08/11/21  7:10 AM         OBJECTIVE:     Vital signs in last 24 hours:    Vitals:    08/24/21 1527   Weight: 63 5 kg (140 lb)   Height: 5' (1 524 m)       Mental Status Evaluation:    Appearance age appropriate, casually dressed   Behavior cooperative, calm   Speech normal rate, normal volume, normal pitch   Mood euthymic   Affect normal range and intensity, appropriate   Thought Processes organized, goal directed, linear   Associations intact associations   Thought Content no overt delusions   Perceptual Disturbances: no auditory hallucinations, no visual hallucinations   Abnormal Thoughts  Risk Potential Suicidal ideation - None  Homicidal ideation - None  Potential for aggression - No   Orientation oriented to person, place, time/date and situation   Memory recent and remote memory grossly intact   Consciousness alert and awake   Attention Span Concentration Span attention span and concentration are age appropriate   Intellect appears to be of average intelligence   Insight intact   Judgement intact   Muscle Strength and  Gait unable to assess today due to virtual visit   Motor activity unable to assess today due to virtual visit   Language no difficulty naming common objects, no difficulty repeating a phrase, unable to assess writing today due to virtual visit   Fund of Knowledge adequate knowledge of current events  adequate fund of knowledge regarding past history  adequate fund of knowledge regarding vocabulary    Pain none   Pain Scale 0       Risks, Benefits And Possible Side Effects Of Medications:    AGREE: Risks, benefits, and possible side effects of medications explained to [facility] and she (or legal representative) verbalizes understanding and agreement for treatment  PREGNANCY: Risks related to Pregnancy or becoming pregnant discussed related to medications and treatment  Patient has agreed to discuss treatment if planning to become pregnant, or if they become pregnant    Controlled Medication Discussion:     Not applicable  ______________________________________________________________        Past Psychiatric History, Social History, Family Psychiatric History, Substance Abuse History, and Traumatic History copied from my note, Beti Chowdhury, dated 08/26/2020  Past Psychiatric History:      Past Inpatient Psychiatric Treatment:   One past inpatient psychiatric admission [facility] during 1st year of college while in [facility]  Admitted for paranoia and bizzarre behavior of shredding paper with her fingers (she does not have a clear memory of what happened)  Past Outpatient Psychiatric Treatment:    2011 Therapist [employer] and after Santosh Salazar left [facility] in [facility]) & psychiatrist Dr Whitney Black 2011;  Radha at [facility] 2019 and now Victor Manuel Nickerson at [facility]  Past Suicide Attempts: no  Past Violent Behavior: no  Past Psychiatric Medication Trials: Risperdal and Abilify     Traumatic History:      Abuse: no history of physical or sexual abuse, positive history of physical abuse, no history of emotional abuse, no history of verbal abuse  Other Traumatic Events: none      Family Psychiatric History:      Suicide Attempts or Completions: denies  Substance Abuse: denies  Denies family history of psychiatric illness     Substance Use History:     Cigarettes:  Never Used  Vap/E Cigarettes: Never  Smokeless Tobacco: Never  Alcohol: None (wedding a sip)  Illicit Substances: Never  Caffeine: No coffee, soda 1 8 oz q 2 weeks    Social History:  Education level: in [employer] at [employer] ([place] and Social Studies)  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)  Marital status: never   Children: none  Current Living Situation: the patient currently lives in a house with roommates (rent)     Social support: mom Valente Aaron and oldest brother Gato Ngo Affiliation: [employer] Vences experience: None  Legal history: Denies  Access to [employer]: Denies     Past Medical History:     Concussion:  denies  Seizures: denies     Past Medical History:    Past Medical History:   Diagnosis Date    Acid reflux     Schizoaffective disorder ([facility] )      Past Medical History Pertinent Negatives:   Diagnosis Date Noted    Disease of thyroid gland 08/26/2020    Head injury 08/26/2020    Seizures ([facility] [address] ) 08/26/2020     Past Surgical History:   Procedure Laterality Date    CHOLECYSTECTOMY LAPAROSCOPIC N/A 11/17/2020    Procedure: Laparoscopic cholecystectomy;  Surgeon: Payton Delaney MD;  [facility];  Service: General     Allergies   Allergen Reactions    Other      Seasonal allergy         Substance Abuse History:    Social History     Substance and Sexual Activity   Alcohol Use Not Currently    Comment: sip at a wedding     Social History     Substance and Sexual Activity   Drug Use Never       Social History:    Social History     Socioeconomic History    Marital status: Single     Spouse name: Not on file    Number of children: 0    Years of education: Not on file  Highest education level: Bachelor's degree (e g , BA, AB, BS)   Occupational History    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[employer] Smoking status: Never Smoker    Smokeless tobacco: Never Used   Vaping Use    Vaping Use: Never used   Substance and Sexual Activity    Alcohol use: Not Currently     Comment: sip at a wedding    Drug use: Never    Sexual activity: Not Currently     Birth control/protection: OCP   Other Topics Concern    Not on file   Social History Narrative    Not on file     Social Determinants of Health     Financial Resource Strain:     Difficulty of Paying Living Expenses:    Food Insecurity:     Worried About Running Out of Food in the Last Year:     [address] in the Last Year:    Transportation Needs:     Lack of Transportation (Medical):      Lack of Transportation (Non-Medical):    Physical Activity: Inactive    Days of Exercise per Week: 0 days    Minutes of Exercise per Session: 0 min   Stress:     Feeling of Stress :    Social Connections:     Frequency of Communication with Friends and Family:     Frequency of Social Gatherings with Friends and Family:     Attends [employer] Services:     Active Member of Clubs or Organizations:     Attends Club or Organization Meetings:     Marital Status:    Intimate Partner Violence: Not At Risk    Fear of Current or Ex-Partner: No    Emotionally Abused: No    Physically Abused: No    Sexually Abused: No       Family Psychiatric History:     Family History   Problem Relation Age of Onset    Hypertension Mother     No Known Problems Brother     Alzheimer's disease Maternal Grandfather     Hypertension Maternal Grandmother     Dialysis Maternal Grandmother     No Known Problems Brother     No Known Problems Brother        ____________________________________________________________________    Confidential Assessment:    Confidential assessment copied from my note, Elijah Dalal, datereshma 08/26/2020  Geri this 78-year-old female who was born and raised until the age of 8 or 7 in [place] by her mother and biological father who passed away from an unknown disease  She states her mother does not discuss it and she states [place], he got sick and they were not able to save him my mom does not talk about it    She reports that her mother moved to the [facility] to live with her mother and sister(patient's grandmother an aunt) and patient and her older brother and 2 younger brothers were left behind in [place] with their mother's best friend  The 3 children were eventually brought to the [facility] 1 at a time as their mother could afford it  She denies any abuse growing up  She reports meeting all milestones and she was in honors classes in high school and graduated on time  She states that she did not have a lot of friends in general as she was quiet however she loved her studies      She states in her senior year of high school she joined [employer] and track  She states this is when she started to have decreased sleep  She states that she was in honors courses, she was very involved in the to clubs as well as she loved reading and she would stay up at night focusing on Anime books, [employer], comic, videos etc she states she was stable on the computer for hours and she was getting approximately 2 hours of sleep per night      Patient states that she started falling asleep in class toward the end of the year and 1 of her teachers approach her about this was going to get her mental health help however she graduated before this occurred  She states that her behaviors of staying up all night on the computer and having obsessive thoughts about Anime and Manga and the fantasy world continued over the summer she continued only have approximately 2 hours of sleep per night  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[employer] [facility]    She states that she never miss classes, she was a biology major and she was attempting to memorize information in her biology books word for word however she was unable to master this  She reports the stress of this along with her lack of sleep was too much    She states her mother was attempting to obtain a counselor for her as she was very stressed out      Geri states 1 day (2011, Freshman year 1st semester at [employer]) she did not go to school and she refers to this as the [employer] "  She states, "I was just sitting there shredding paper with my hands for 8 hours straight, my family was trying to help me and I was screaming at them and threatening them and I would not let them help me and they tried to reach a counselor and they finally had a call to have me taken away    She reports this is her only inpatient admission to psychiatric [facility]  She reports at this time she was extremely paranoid and she believes she was having auditory hallucinations at this time as well however she cannot recall much from this "episode "       Since 2011 she has been under the care of therapists and Psychiatry however this provider does not have documents or records to review  Patient states she was initiated on Risperdal however she did not like how she felt on this and she did not feel that it worked  She was only on for approximately 2 weeks and then discontinued and initiated on Abilify  She states the highest dose of Abilify was 25 mg which was maintained for some time  She denies any side effects to Abilify however she has been slowly titrated down over the years and she has been on approximately 5 mg for the last 2 years without issue and she has not had significant paranoia for the last 1 and half-2 years     She was given a diagnosis of schizoaffective bipolar type    At the time in 2011 it sounds as though she may have met the criteria as she was staying up for days on and she reports having at least a minimum of 72 hours of being wake and not missing her sleep, she reports being hyper, being distracted being irritable having appetite changes, having psychomotor agitation and having significant increased goal-directed activity  Of note she states this did build up over time in the majority of these behaviors occurred after her significant lack of sleep (meaning the irritability, agitation, psychomotor agitation, appetite changes)  She reports her increased goal-directed activity or her obsessive thoughts about the Anime and comics are what led to her lack of sleep  She then experienced psychosis while she was up for approximately 72 hours as well as significant irritability and agitation during that same period of time  After the episode she reports having significant difficulty with self-care which included challenges with showering, brushing her teeth, feeling down, decreased appetite  She reports Abilify helped all of these symptoms and she has been well since that time  She continues in psychotherapy  This provider feels as though she did have obsessive behaviors and this should be monitored        08/26/2020:patient gave this provider verbal release of information to review records from [facility] and [facility] in other words all records in the [address] [place] [address] to review labs, records and EKGs  This provider was only able to find labs from 2011 and patient will need to have new labs obtained  Scales:  No new scales today       Assessment/Plan:       Diagnoses and all orders for this visit:    Schizoaffective disorder, bipolar type (HCC)  -     benztropine (COGENTIN) 0 5 mg tablet; Take 1 tablet (0 5 mg total) by mouth 2 (two) times a day  -     ARIPiprazole (ABILIFY) 10 mg tablet; Take 1/2 tab (5mg) PO daily    Adjustment disorder, unspecified type    Benign essential hypertension  -     ARIPiprazole (ABILIFY) 10 mg tablet;  Take 1/2 tab (5mg) PO daily    Muscle spasm  -     benztropine (COGENTIN) 0 5 mg tablet; Take 1 tablet (0 5 mg total) by mouth 2 (two) times a day          Treatment Recommendations/Precautions/Plan:    Geri states she has been doing well since last visit, she continues to take classes at [employer] and 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  She states her mood has been good she has not had any psychotic symptoms no paranoid ideation no agitation irritability, no depressive or significant anxiety symptoms  She states she was slightly anxious at work Korean class however she worked this through with her therapist    She denies suicidal homicidal ideation  She states she has been eating well, sleeping well, she feels as though her motivation is good and energy is moderate  Patient continues to follow with PCP for and elevated ALT, this was reviewed today and ALT was back within normal limits  Patient has been educated about their diagnosis and treatment modalities  They voiced understanding and agreement with the following plan:    -    Labs reviewed today with patient, all within normal limits, no additional labs ordered today  ALT back within normal limits  -  Continue Cogentin 0 5 mg p o  b i d  for continued improvement in what is possibly EPS however at this point we now may be thinking that she is having these "muscle waves" from low iron count  She states since starting iron replacement therapy this sensation of muscle waves is decreasing significantly  At next visit in June will consider decreasing Cogentin dosing to 1 time daily  If at the next office visit after that she is doing well and not having any significant  Symptoms are muscle waves will completely discontinue Cogentin at that time to minimize medication burden    At this 0 3 30 day supply with 2 refills sent to pharmacy 04/20/2021      -Continue Abilify 5 mg po daily  For continued maintenance of schizoaffective bipolar disorder, mood stabilization and management of irritability agitation  Sixty day supply with 2 refills sent to pharmacy 08/24/2021       -  Continue psychotherapy with Franchesca sutton at [facility]  -followup with this provider 10/20/2021 3:30 p m  Virtual    - maintain follow-up with PCP for routine medical care, routine yearly lab work and any other medical concerns     -Patient will call if issues or concerns     -Discussed self monitoring of symptoms, and symptom monitoring tools     -Patient has been informed of 24 hours and weekend coverage for urgent situations accessed by calling the main clinic phone number      [facility] Crisis Telephone Numbers and the National Suicide Prevention Hotline Number Provided to Patient     -Treatment Plan with Franchesca sutton psychotherapist at [facility]      This note was shared with patient  MEHNAZ Darnell 08/24/21      Portions of the record may have been created with voice recognition software  Occasional wrong word or "sound a like" substitutions may have occurred due to the inherent limitations of voice recognition software  Read the chart carefully and recognize, using context, where substitutions have occurred

## 2021-08-26 DIAGNOSIS — F25.0 SCHIZOAFFECTIVE DISORDER, BIPOLAR TYPE (HCC): ICD-10-CM

## 2021-08-26 DIAGNOSIS — M62.838 MUSCLE SPASM: ICD-10-CM

## 2021-08-26 RX ORDER — BENZTROPINE MESYLATE 0.5 MG/1
TABLET ORAL
Qty: 60 TABLET | Refills: 2 | Status: SHIPPED | OUTPATIENT
Start: 2021-08-26 | End: 2021-10-20 | Stop reason: SDUPTHER

## 2021-09-22 ENCOUNTER — TELEMEDICINE (OUTPATIENT)
Dept: BEHAVIORAL/MENTAL HEALTH CLINIC | Facility: CLINIC | Age: 26
End: 2021-09-22
Payer: COMMERCIAL

## 2021-09-22 DIAGNOSIS — F25.0 SCHIZOAFFECTIVE DISORDER, BIPOLAR TYPE (HCC): Primary | ICD-10-CM

## 2021-09-22 PROCEDURE — 90832 PSYTX W PT 30 MINUTES: CPT | Performed by: PSYCHIATRY & NEUROLOGY

## 2021-09-22 NOTE — PSYCH
Session time [phone] (total time 32 minutes)    Virtual Regular Visit    Verification of patient location:    Patient is located in the following state in which I hold an active license [place]      Assessment/Plan:    Problem List Items Addressed This Visit        Other    Schizoaffective disorder, bipolar type ([facility] [address] ) - Primary          Goals addressed in session: Goal 1          Reason for visit is   Chief Complaint   Patient presents with    Virtual Regular Visit        Encounter provider OFE Javier    Provider located at [address] [place] [id number] [phone]      Recent Visits  No visits were found meeting these conditions  Showing recent visits within past 7 days and meeting all other requirements  Today's Visits  Date Type Provider Dept   09/22/21 Telemedicine [facility] Therapist [place]   Showing today's visits and meeting all other requirements  Future Appointments  No visits were found meeting these conditions  Showing future appointments within next 150 days and meeting all other requirements       The patient was identified by name and date of birth  Zak Soriano was informed that this is a telemedicine visit and that the visit is being conducted throughDahu and patient was informed that this is a secure, HIPAA-compliant platform  She agrees to proceed     My office door was closed  No one else was in the room  She acknowledged consent and understanding of privacy and security of the video platform  The patient has agreed to participate and understands they can discontinue the visit at any time  Patient is aware this is a billable service  Subjective  Zak Soriano is a 27 y o  female presenting for follow up  Josephine Dejesus shared that she is doing well back at school, although she is tired getting used to the routine    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, although quiet, 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  She said that her social life has been quiet, having given up on dating for now, and not really being one to go out much  She talked about meeting another young teacher at school and befriending her, and said that they talk often  Ricardo Springer noted, though, that she had a situation with her the other day that caused an upsetting reaction for her that she wants to learn how to manage  Ricardo Springer said that she was in the staff breakroom and the other teacher came in   Kiaran January, but the other teacher just grabbed a product and went into the bathroom without acknowledging Johanna at all  Ricardo Springer said that she automatically thought that this woman did not really want to be her friend after all and that her "ignoring" her was proof of this  Discussed with Ricardo Springer what cognitive distortions are, how to catch them and correct them  With this knowledge, Ricardo Springer was able to reframe her thoughts, and recognized that she took her friend's reaction too personally  Discussed continued awareness and practice in identifying cognitive distortions and how to manage them so they are not as disruptive  A: Ricardo Springer presented as calm and generally euthymic today, with a bright affect, good eye contact and calm behavior  Her concentration was intact, thought process logical and organized  Insight and judgment intact  Denies SI HI and psychosis  Good progress toward goals  P: Ricardo Springer will work on identifying and challenging cognitive distortions, will continue to work on social relationships and will return in two weeks as scheduled        HPI     Past Medical History:   Diagnosis Date    Acid reflux     Schizoaffective disorder [facility])        Past Surgical History:   Procedure Laterality Date    CHOLECYSTECTOMY LAPAROSCOPIC N/A 11/17/2020    Procedure: Laparoscopic cholecystectomy;  Surgeon: Carter Crump MD;  Location: [facility];  Service: General       Current Outpatient Medications   Medication Sig Dispense Refill    acetaminophen (TYLENOL) 325 mg tablet Take 2 tablets (650 mg total) by mouth every 6 (six) hours as needed (mild pain)      amLODIPine (NORVASC) 5 mg tablet       ARIPiprazole (ABILIFY) 10 mg tablet Take 1/2 tab (5mg) PO daily 30 tablet 2    benztropine (COGENTIN) 0 5 mg tablet TAKE 1 TABLET(0 5 MG) BY MOUTH TWICE DAILY 60 tablet 2    FeroSul 325 (65 Fe) MG tablet Take 1 tablet by mouth daily with breakfast      ferrous sulfate 325 (65 Fe) mg tablet Take 325 mg by mouth 2 (two) times a day      hydrochlorothiazide (HYDRODIURIL) 12 5 mg tablet       ibuprofen (MOTRIN) 200 mg tablet Take 2 tablets (400 mg total) by mouth every 6 (six) hours as needed for mild pain or headaches (Take with food ) 30 tablet 0    loratadine (CLARITIN) 10 mg tablet       Multiple Vitamin (multivitamin) tablet Take 1 tablet by mouth      norgestimate-ethinyl estradiol (ORTHO TRI-CYCLEN LO) 0 18/0 215/0 25 MG-25 MCG per tablet Take 1 tablet by mouth daily      norgestimate-ethinyl estradiol (ORTHO TRI-CYCLEN LO) 0 18/0 215/0 25 MG-25 MCG per tablet Take 1 tablet by mouth daily      omeprazole (PriLOSEC) 40 MG capsule       ondansetron (ZOFRAN-ODT) 4 mg disintegrating tablet Take 1 tablet (4 mg total) by mouth every 6 (six) hours as needed for nausea or vomiting 20 tablet 0     No current facility-administered medications for this visit  Allergies   Allergen Reactions    Other      Seasonal allergy         Review of Systems    Video Exam    There were no vitals filed for this visit  Physical Exam     I spent 32 minutes directly with the patient during this visit    VIRTUAL VISIT DISCLAIMER    Pete Sanches verbally agrees to participate in [employer]   Pt is aware that [employer] could be limited without vital signs or the ability to perform a full hands-on physical Moon Montez understands she or the provider may request at any time to terminate the video visit and request the patient to seek care or treatment in person

## 2021-09-22 NOTE — BH TREATMENT PLAN
Danelle Astrid  1995       Date of Initial Treatment Plan: 4/16/2020    Date of Current Treatment Plan: 09/22/21    Treatment Plan Number 4    Strengths/Personal Resources for Self Care: "I am kind  I have empathy  I am compassionate  I am curious and a good listener "      Diagnosis:   1  Schizoaffective disorder, bipolar type ([facility] [address] )         Area of Needs: "I want to be reflective  I want to be aware of that I not only do things for them, but they do things for me "      Long Term Goal 1: A I want to continue to be more reflective and to better understand a situation before I respond to it  B I will consider the "proof" I have of the concerning thoughts I have    Target Date:  1/22/2022  Completion Date: n/a          Short Term Objectives for Goal 1: AI want to remain aware of my responsibilty to manage my personal opinions  B  I will challenge my troublesome thoughts with fact or other possible alternatives  C: I want to view my mistakes as learning experiences  D  I will maintain my awareness of the importance of self care  Long Term Goal 2: I would like to do better socially  Target Date:  1/22/2022  Completion Date: n/a    Short Term Objectives for Goal 2:  A: I will give myself time to think before answering someone  B: I will work on being more present in a conversation instead of worrying about what I am going to say  C: I will try to focus on my thoughts instead of my emotions, in order to be able focus more on the conversation  GOAL 1: Modality: Individual 2x per month   Completion Date n/a and The person(s) responsible for carrying out the plan is  Geri     Goal 2: Modality: Individual 2x per month  Completion Date n/a      Behavioral Health Treatment Plan [facility]: Diagnosis and Treatment Plan explained to Lucy Antunez relates understanding diagnosis and is agreeable to Treatment Plan   Yes    **Johanna provided verbal consent during session today, 9/22/2021 at 1730, due to [address] distancing measures/telehealth visit      Client Comments : Please share your thoughts, feelings, need and/or experiences regarding your treatment plan: n/a

## 2021-10-06 ENCOUNTER — TELEMEDICINE (OUTPATIENT)
Dept: BEHAVIORAL/MENTAL HEALTH CLINIC | Facility: CLINIC | Age: 26
End: 2021-10-06
Payer: COMMERCIAL

## 2021-10-06 DIAGNOSIS — F25.0 SCHIZOAFFECTIVE DISORDER, BIPOLAR TYPE (HCC): Primary | ICD-10-CM

## 2021-10-06 PROCEDURE — 90832 PSYTX W PT 30 MINUTES: CPT | Performed by: PSYCHIATRY & NEUROLOGY

## 2021-10-20 ENCOUNTER — TELEMEDICINE (OUTPATIENT)
Dept: PSYCHIATRY | Facility: CLINIC | Age: 26
End: 2021-10-20
Payer: COMMERCIAL

## 2021-10-20 VITALS — HEIGHT: 59 IN | BODY MASS INDEX: 26.21 KG/M2 | WEIGHT: 130 LBS

## 2021-10-20 DIAGNOSIS — F25.0 SCHIZOAFFECTIVE DISORDER, BIPOLAR TYPE (HCC): Primary | ICD-10-CM

## 2021-10-20 DIAGNOSIS — M62.838 MUSCLE SPASM: ICD-10-CM

## 2021-10-20 DIAGNOSIS — F43.20 ADJUSTMENT DISORDER, UNSPECIFIED TYPE: ICD-10-CM

## 2021-10-20 PROCEDURE — 99212 OFFICE O/P EST SF 10 MIN: CPT | Performed by: NURSE PRACTITIONER

## 2021-10-20 RX ORDER — BENZTROPINE MESYLATE 0.5 MG/1
0.5 TABLET ORAL 2 TIMES DAILY
Qty: 60 TABLET | Refills: 2 | Status: SHIPPED | OUTPATIENT
Start: 2021-10-20 | End: 2022-01-04 | Stop reason: SDUPTHER

## 2021-12-16 ENCOUNTER — TELEMEDICINE (OUTPATIENT)
Dept: BEHAVIORAL/MENTAL HEALTH CLINIC | Facility: CLINIC | Age: 26
End: 2021-12-16
Payer: COMMERCIAL

## 2021-12-16 DIAGNOSIS — F25.0 SCHIZOAFFECTIVE DISORDER, BIPOLAR TYPE (HCC): Primary | ICD-10-CM

## 2021-12-16 DIAGNOSIS — F43.20 ADJUSTMENT DISORDER, UNSPECIFIED TYPE: ICD-10-CM

## 2021-12-16 PROCEDURE — 90834 PSYTX W PT 45 MINUTES: CPT | Performed by: PSYCHIATRY & NEUROLOGY

## 2021-12-17 ENCOUNTER — TELEPHONE (OUTPATIENT)
Dept: PSYCHIATRY | Facility: CLINIC | Age: 26
End: 2021-12-17

## 2021-12-17 NOTE — TELEPHONE ENCOUNTER
----- Message from OFE Wilkes sent at 12/16/2021  4:34 PM EST -----  Génesis, meet!  I see Geri for therapy and she used to see Ruth Douglas.  She will need to be set up with someone new, as she does not have any other appointments for med management.  Thanks!    Shawanda

## 2021-12-23 ENCOUNTER — TELEPHONE (OUTPATIENT)
Dept: PSYCHIATRY | Facility: CLINIC | Age: 26
End: 2021-12-23

## 2022-01-04 ENCOUNTER — TELEPHONE (OUTPATIENT)
Dept: PSYCHIATRY | Facility: CLINIC | Age: 27
End: 2022-01-04

## 2022-01-04 NOTE — TELEPHONE ENCOUNTER
Scheduled on 01/26/22 at 1:00 p m with Dr Krystal Aldridge  Patient also needs med refills, benztropine (COGENTIN) 0 5 mg tablet and ARIPiprazole (ABILIFY) 10 mg tablet

## 2022-01-04 NOTE — TELEPHONE ENCOUNTER
Pt called looking to schedule, was previous [facility] patient  She is also in need of refills on a medication   Pt is requesting call back [phone]

## 2022-01-26 ENCOUNTER — TELEMEDICINE (OUTPATIENT)
Dept: PSYCHIATRY | Facility: CLINIC | Age: 27
End: 2022-01-26

## 2022-01-26 DIAGNOSIS — F25.0 SCHIZOAFFECTIVE DISORDER, BIPOLAR TYPE (HCC): Primary | ICD-10-CM

## 2022-01-26 DIAGNOSIS — M62.838 MUSCLE SPASM: ICD-10-CM

## 2022-01-26 RX ORDER — BENZTROPINE MESYLATE 0.5 MG/1
0.5 TABLET ORAL 2 TIMES DAILY
Qty: 60 TABLET | Refills: 2 | Status: SHIPPED | OUTPATIENT
Start: 2022-01-26 | End: 2022-03-09 | Stop reason: SDUPTHER

## 2022-01-26 RX ORDER — ARIPIPRAZOLE 10 MG/1
TABLET ORAL
Qty: 30 TABLET | Refills: 2 | Status: SHIPPED | OUTPATIENT
Start: 2022-01-26 | End: 2022-03-09 | Stop reason: SDUPTHER

## 2022-01-26 NOTE — PSYCH
[address]    Name and Date of Birth:  Jesika John 27 y o  1995 MRN: [id number]    Date of Visit: January 26, 2022    Reason for visit: Full psychiatric intake assessment for medication management     Virtual Regular Visit    Verification of patient location: [place]    Patient is located in the following state in which I hold an active license: [place]    Reason for visit is: Medication Management    Encounter provider Lizz Sadler MD    Provider located at [address] [place] [id number] [phone]      Recent Visits  No visits were found meeting these conditions  Showing recent visits within past 7 days and meeting all other requirements  Future Appointments  No visits were found meeting these conditions  Showing future appointments within next 150 days and meeting all other requirements       The patient was identified by name and date of birth  Jesika John was informed that this is a telemedicine visit and that the visit is being conducted through [address] [place] [address] Now and patient was informed that this is a secure, HIPAA-compliant platform  She agrees to proceed    My office door was closed  No one else was in the room  She acknowledged consent and understanding of privacy and security of the video platform  The patient has agreed to participate and understands they can discontinue the visit at any time  Patient is aware this is a billable service  Video Exam    There were no vitals filed for this visit  HPI       Jesika John is a 27 [facility] female (migrated to [address] [place] [address],[address] at age 6/6), single, domiciled alone in [place], currently employed as a teacher in [employer] significant for Schizoaffective disorder, Bipolar type, h/o 1 past psychiatric hospitalization in 2011 (for manic and psychotic episode), no past suicide attempts, no Self injurious behaviors, no Physical aggression, PMH significant for Iron deficiency anemia, no significant substance abuse history, previously followed at [facility] outpatient clinic with MEHNAZ Justice and is now transferring over to the resident clinic upon Malka's departure  In 2011, during her 1st year at [employer], patient reports an episode where she experienced lack of sleep (> 72 hrs), psychomotor agitation, increased goal oriented activity (shredding paper by hand for 8 hrs), obsessive thoughts (about anime), paranoid ideations and auditory hallucinations  This was followed by an inability to care for herself with difficulty showering, brushing her teeth, lack of appetite and depressed mood  This led to her first [facility] admission  Pt was previously on Abilify 25 mg, but has titrated down over the years and has been stable on 5 mg for the past couple of years  She experienced some EPS last year and was started on Cogentin with improvement  Pt denies any similar episodes of hypmania/niharika or psychosis since 2011  On psychiatric ROS, pt reports good sleep with low energy (due to Fe def  Anemia), good appetite, concentration and mood  Pt denies any feelings of guilt or hopelessness  She continues to enjoy learning [place], teaching, watching movies, listening to movies and hanging out with family  Pt admits she is a "loner" and does not have many friends, which she is ok with, and reports enjoying her alone time  Pt denies any auditory/visual hallucinations, paranoid ideations/delusions, symptoms of niharika/hypomania or suicidal/homicidal ideations, intent or plan           Psychiatric Review Of Systems:    Sleep changes: no  Appetite changes: no  Weight changes: no  Energy/anergy: no  Interest/pleasure/anhedonia: no  Somatic symptoms: no  Anxiety/panic: no  Niharika: no  Guilty/hopeless: no  Self injurious behavior/risky behavior: no  Suicidal ideation: no  Homicidal ideation: no  Auditory hallucinations: no  Visual hallucinations: no  Other hallucinations: no  Delusional thinking: no  Eating disorder history: no  Obsessive/compulsive symptoms: no    Review Of Systems:    Constitutional negative   ENT negative   Cardiovascular negative   Respiratory negative   Gastrointestinal negative   Genitourinary negative   Musculoskeletal negative   Integumentary negative   Neurological negative   Endocrine negative   Other Symptoms none, all other systems are negative       Family Psychiatric History:     Maternal grandfather: herve    Past Psychiatric History:     Past Inpatient Psychiatric Treatment:   Once, in  at [facility] for paranoid and bizarre behavior (1 5 weeks)  Past Outpatient Psychiatric Treatment:    - [facility] in 2011 with Dr Marylene Dollar (psychiatrist) and Shalini Ibanez (therapists)  - [facility]  with Kevin Kessler NP and Maria Guadalupe Vargas previously, currently therapy with Prisca Ch  Past Suicide Attempts: no  Past Violent Behavior: no  Past Psychiatric Medication Trials: Risperdal (only took for 1-2 weeks, did not like how she felt on it), cogentin, Abilify        Substance Abuse History:    No history of ETOH, illict substance, or tobacco abuse  No past legal actions or arrests secondary to substance intoxication  The patient denies prior DWIs/DUIs  No history of outpatient/inpatient rehabilitation programs  Leopoldo Cobb does not exhibit objective evidence of substance withdrawal during today's examination nor does Geri appear under the influence of any psychoactive substance  Social History:  Born in [place], migrated to [address] [place] [address],[address] at age 7 or 4  Developmental: Denies a history of milestone/developmental delay  Denies a history of in-utero exposure to toxins/illicit substances  There is no documented history of IEP or need for special education  Parents: mother alive, lives in [place] (moving to [place] in a few weeks   Father  at age    Siblings: 3 brothers (25, 32, 27)  Education: graduating with Masters degree in Education in the fall  Marital history: single  Living arrangement, social support: Lives alone  Good social support from mother or elder brother  Occupational History:  at [employer] to firearms: Denies direct access to weapons/firearms  Asa Henderson has no history of arrests or violence with a deadly weapon  Hx of  service: denies  Prior incarcerations or legal issues: denies  Confucianist affiliation: [employer] (practicing)    Traumatic History:     Abuse:none is reported  Other Traumatic Events: denies  Pt's father  from an unknown disease in [place] when she was 8, but she states it was not traumatic to her  Past Medical History:    Past Medical History:   Diagnosis Date    Acid reflux     Schizoaffective disorder [facility])      Past Medical History Pertinent Negatives:   Diagnosis Date Noted    Disease of thyroid gland 2020    Head injury 2020    Seizures ([facility] [address] ) 2020     Past Surgical History:   Procedure Laterality Date    CHOLECYSTECTOMY LAPAROSCOPIC N/A 2020    Procedure: Laparoscopic cholecystectomy;  Surgeon: Mary Jane Amato MD;  Location: [facility];  Service: General     Allergies   Allergen Reactions    Other      Seasonal allergy         History Review: The following portions of the patient's history were reviewed and updated as appropriate: allergies, current medications, past family history, past medical history, past social history, past surgical history and problem list     OBJECTIVE:    Vital signs in last 24 hours: There were no vitals filed for this visit      Mental Status Evaluation:    Appearance age appropriate, casually dressed, looks stated age   Behavior cooperative, calm   Speech normal rate, normal volume, normal pitch   Mood normal   Affect normal range and intensity, appropriate   Thought Processes organized, goal directed   Associations intact associations   Thought Content no overt delusions   Perceptual Disturbances: no auditory hallucinations, no visual hallucinations   Abnormal Thoughts  Risk Potential Suicidal ideation - None  Homicidal ideation - None  Potential for aggression - No   Orientation oriented to person, place, time/date and situation   Memory recent and remote memory grossly intact   Consciousness alert and awake   Attention Span Concentration Span attention span and concentration are age appropriate   Intellect appears to be of average intelligence   Insight intact   Judgement intact   Muscle Strength and  Gait normal muscle strength and normal muscle tone, normal gait and normal balance   Motor Activity no abnormal movements   Language no difficulty naming common objects, no difficulty repeating a phrase, no difficulty writing a sentence   Fund of Knowledge adequate knowledge of current events  adequate fund of knowledge regarding past history  adequate fund of knowledge regarding vocabulary    Pain none   Pain Scale 0       Laboratory Results: I have personally reviewed all pertinent laboratory/tests results    Recent Labs (last 6 months):   No visits with results within 6 Month(s) from this visit     Latest known visit with results is:   Admission on 11/15/2020, Discharged on 11/18/2020   Component Date Value    EXT PREG TEST UR (Ref: N* 11/15/2020 negative     Control 11/15/2020 valid     WBC 11/15/2020 10 88*    RBC 11/15/2020 4 63     Hemoglobin 11/15/2020 12 9     Hematocrit 11/15/2020 38 7     MCV 11/15/2020 84     MCH 11/15/2020 27 9     MCHC 11/15/2020 33 3     RDW 11/15/2020 14 0     MPV 11/15/2020 10 6     Platelets 66/59/2771 439*    nRBC 11/15/2020 0     Neutrophils Relative 11/15/2020 56     Immat GRANS % 11/15/2020 0     Lymphocytes Relative 11/15/2020 34     Monocytes Relative 11/15/2020 8     Eosinophils Relative 11/15/2020 1     Basophils Relative 11/15/2020 1     Neutrophils Absolute 11/15/2020 6 20     Immature Grans Absolute 11/15/2020 0 03     Lymphocytes Absolute 11/15/2020 3 64     Monocytes Absolute 11/15/2020 0 84     Eosinophils Absolute 11/15/2020 0 12     Basophils Absolute 11/15/2020 0 05     Sodium 11/15/2020 140     Potassium 11/15/2020 3 9     Chloride 11/15/2020 106     CO2 11/15/2020 28     ANION GAP 11/15/2020 6     BUN 11/15/2020 13     Creatinine 11/15/2020 0 89     Glucose 11/15/2020 77     Calcium 11/15/2020 9 2     AST 11/15/2020 210*    ALT 11/15/2020 198*    Alkaline Phosphatase 11/15/2020 132*    Total Protein 11/15/2020 7 6     Albumin 11/15/2020 3 5     Total Bilirubin 11/15/2020 1 81*    eGFR 11/15/2020 103     Lipase 11/15/2020 262     Color, UA 11/15/2020 [place]     Clarity, UA 11/15/2020 Clear     pH, UA 11/15/2020 7 0     Leukocytes, UA 11/15/2020 Negative     Nitrite, UA 11/15/2020 Negative     Protein, UA 11/15/2020 100 (2+)*    Glucose, UA 11/15/2020 Negative     Ketones, UA 11/15/2020 15 (1+)*    Urobilinogen, UA 11/15/2020 >=8 0*    Bilirubin, UA 11/15/2020 Interference- unable to analyze*    Blood, UA 11/15/2020 Negative     Specific Gravity, UA 11/15/2020 1 025     RBC, UA 11/15/2020 None Seen     WBC, UA 11/15/2020 4-10*    Epithelial Cells 11/15/2020 Occasional     Bacteria, UA 11/15/2020 None Seen     MUCUS THREADS 11/15/2020 Occasional*    Blood Culture 11/15/2020 No Growth After 5 Days   Blood Culture 11/15/2020 No Growth After 5 Days       SARS-CoV-2 11/15/2020 Negative     INFLUENZA A PCR 11/15/2020 Negative     INFLUENZA B PCR 11/15/2020 Negative     RSV PCR 11/15/2020 Negative     Sodium 11/16/2020 143     Potassium 11/16/2020 3 1*    Chloride 11/16/2020 111*    CO2 11/16/2020 26     ANION GAP 11/16/2020 6     BUN 11/16/2020 6     Creatinine 11/16/2020 0 63     Glucose 11/16/2020 82     Calcium 11/16/2020 8 4     Corrected Calcium 11/16/2020 9 4     AST 11/16/2020 81*    ALT 11/16/2020 161*    Alkaline Phosphatase 11/16/2020 107     Total Protein 11/16/2020 5 7*    Albumin 11/16/2020 2 8*    Total Bilirubin 11/16/2020 0 48     eGFR 11/16/2020 142     Phosphorus 11/16/2020 2 5*    Magnesium 11/16/2020 2 5     Lipase 11/16/2020 252     WBC 11/16/2020 5 48     RBC 11/16/2020 3 80*    Hemoglobin 11/16/2020 10 5*    Hematocrit 11/16/2020 32 1*    MCV 11/16/2020 85     MCH 11/16/2020 27 6     MCHC 11/16/2020 32 7     RDW 11/16/2020 14 5     Platelets 21/83/0242 326     MPV 11/16/2020 10 5     EXT Preg Test, Ur 11/16/2020 Negative     Control 11/16/2020 Valid     Sodium 11/17/2020 146*    Potassium 11/17/2020 4 2     Chloride 11/17/2020 115*    CO2 11/17/2020 26     ANION GAP 11/17/2020 5     BUN 11/17/2020 14     Creatinine 11/17/2020 0 74     Glucose 11/17/2020 108     Calcium 11/17/2020 8 5     eGFR 11/17/2020 128     Magnesium 11/17/2020 2 7*    Total Bilirubin 11/17/2020 0 40     Bilirubin, Direct 11/17/2020 0 18     Alkaline Phosphatase 11/17/2020 95     AST 11/17/2020 36     ALT 11/17/2020 118*    Total Protein 11/17/2020 5 9*    Albumin 11/17/2020 2 9*    WBC 11/17/2020 7 21     RBC 11/17/2020 3 67*    Hemoglobin 11/17/2020 10 2*    Hematocrit 11/17/2020 31 3*    MCV 11/17/2020 85     MCH 11/17/2020 27 8     MCHC 11/17/2020 32 6     RDW 11/17/2020 14 0     MPV 11/17/2020 10 0     Platelets 22/81/3284 330     nRBC 11/17/2020 0     Neutrophils Relative 11/17/2020 65     Immat GRANS % 11/17/2020 0     Lymphocytes Relative 11/17/2020 24     Monocytes Relative 11/17/2020 11     Eosinophils Relative 11/17/2020 0     Basophils Relative 11/17/2020 0     Neutrophils Absolute 11/17/2020 4 66     Immature Grans Absolute 11/17/2020 0 02     Lymphocytes Absolute 11/17/2020 1 74     Monocytes Absolute 11/17/2020 0 76     Eosinophils Absolute 11/17/2020 0 01     Basophils Absolute 11/17/2020 0 02     Case Report 11/17/2020 Value:Surgical Pathology Report                         Case: [id number]                                   Authorizing Provider:  Annita Milian MD          Collected:           11/17/2020 [id number]              Ordering Location:     [address] [place] [address]      Received:            11/17/2020 [address] [place] [address] Operating Room                                                      Pathologist:           Emily Hoff MD                                                                  Specimen:    Gallbladder                                                                                Final Diagnosis 11/17/2020                      Value: This result contains rich text formatting which cannot be displayed here   Additional Information 11/17/2020                      Value: This result contains rich text formatting which cannot be displayed here  Dereje Pert Gross Description 11/17/2020                      Value: This result contains rich text formatting which cannot be displayed here   Ventricular Rate 11/16/2020 71     Atrial Rate 11/16/2020 71     NJ Interval 11/16/2020 158     QRSD Interval 11/16/2020 72     QT Interval 11/16/2020 400     QTC Interval 11/16/2020 434     P Axis 11/16/2020 60     QRS Axis 11/16/2020 68     T Wave Axis 11/16/2020 33        Suicide/Homicide Risk Assessment:    Risk of Harm to Self:  The following ratings are based on assessment at the time of the interview  Based on today's assessment, Geri presents the following risk of harm to self: none    Risk of Harm to Others: The following ratings are based on assessment at the time of the interview  Based on today's assessment, Geri presents the following risk of harm to others: none    The following interventions are recommended: no intervention changes needed   Although patient's acute lethality risk is LOW, long-term/chronic lethality risk is mildly elevated given history of manic and psychotic episode  However, at the current moment, Richie Dale is future-oriented, forward-thinking, and demonstrates ability to act in a self-preserving manner as evidenced by volitionally presenting to the clinic today, seeking treatment  Additionally, Richie Dale sits throughout the assessment wearing personal protective gear (ie mask) in the context of an ongoing viral pandemic, suggesting a will and desire to live  At this juncture, inpatient hospitalization is not currently warranted  To mitigate future risk, patient should adhere to treatment recommendations, avoid alcohol/illicit substance use, utilize community-based resources and familiar support, and prioritize mental health treatment  Assessment/Plan:   Pt is 28 y/o F, PPH of Schizoaffective disorder, bipolar type, 1 past [facility] admission in 2011 for a manic and psychotic episode, no past suicide attempts, stable on Abilify 5 mg with no recent episodes of psychosis or hypomania/jonnathan since 2011  Pt  Is high functioning; works as a  and is a few months away from earning her Masters degree  She denies any current suicidal ideations, intent or plan and has good social support from her mother and elder brother  DSM-V Diagnoses:     Diagnoses and all orders for this visit:    Schizoaffective disorder, bipolar type ([address] )    Muscle spasm          Treatment Recommendations/Precautions:     [facility] for treatment of Schizoaffective disorder   Continue Abilify 5 mg for mood stabilization   Continue Cogentin 0 5 mg po b i d for EPS symptoms   Continue psychotherapy with Nahun Scott at [facility]- F/u with primary care provider for on-going medical care   Follow-up with this provider in 1 month        Aware of 24 hour and weekend coverage for urgent situations accessed by calling [address] [address] main practice number    Medications Risks/Benefits:      Risks, Benefits And Possible Side Effects Of Medications:    Risks, benefits, and possible side effects of medications explained to [facility] and she verbalizes understanding and agreement for treatment  Controlled Medication Discussion:     No recent records found for controlled prescriptions according to [place] Prescription Drug Monitoring Program    Treatment Plan:    Completed and signed during the session: Yes - Treatment Plan done but not signed at time of office visit due to:  Plan reviewed in person and verbal consent given due to [place] social distancing      Note Share Disclaimer: This note was shared with patient  Eileen Guillaume MD 01/26/22  I spent 60 minutes directly with the patient during this visit    VIRTUAL VISIT DISCLAIMER      Jb Blevins verbally agrees to participate in [employer]  Pt is aware that [employer] could be limited without vital signs or the ability to perform a full hands-on physical exam @ understands she or the provider may request at any time to terminate the video visit and request the patient to seek care or treatment in person

## 2022-01-26 NOTE — BH TREATMENT PLAN
TREATMENT PLAN (Medication Management Only)        [facility]    Name and Date of Birth:  Lavinia Monson 27 y o  1995  Date of Treatment Plan: January 26, 2022  Diagnosis/Diagnoses:    1  Schizoaffective disorder, bipolar type ([address] )    2  Muscle spasm      Strengths/Personal Resources for Self-Care: supportive family, taking medications as prescribed, ability to understand psychiatric illness, average or above intelligence, financial security, general fund of knowledge, good physical health, independence, special hobby/interest, stable employment, strong cy[place]  Area/Areas of need (in own words): mood instability  1  Long Term Goal: maintain control of mood stability  Target Date: 6 months - 7/26/2022  Person/Persons responsible for completion of goal: Geri  2  Short Term Objective (s) - How will we reach this goal?:   A  Provider new recommended medication/dosage changes and/or continue medication(s): continue current medications as prescribed (Abilify)  B   Complete Masters degree    Target Date: 6 months - 7/26/2022  Person/Persons Responsible for Completion of Goal: Geri  Progress Towards Goals: stable, continuing treatment  Treatment Modality: medication management every 3 months  Review due 6 months from date of this plan: 6 months - 7/26/2022  Expected length of service: maintenance unless revised  My Physician/PA/NP and I have developed this plan together and I agree to work on the goals and objectives  I understand the treatment goals that were developed for my treatment

## 2022-01-31 ENCOUNTER — TELEMEDICINE (OUTPATIENT)
Dept: BEHAVIORAL/MENTAL HEALTH CLINIC | Facility: CLINIC | Age: 27
End: 2022-01-31
Payer: COMMERCIAL

## 2022-01-31 DIAGNOSIS — F25.0 SCHIZOAFFECTIVE DISORDER, BIPOLAR TYPE (HCC): ICD-10-CM

## 2022-01-31 DIAGNOSIS — F43.20 ADJUSTMENT DISORDER, UNSPECIFIED TYPE: Primary | ICD-10-CM

## 2022-01-31 PROCEDURE — 90834 PSYTX W PT 45 MINUTES: CPT | Performed by: PSYCHIATRY & NEUROLOGY

## 2022-01-31 NOTE — PSYCH
Session time 2734-6831 (total time 50 minutes0    Virtual Regular Visit    Verification of patient location:    Patient is located in the following state in which I hold an active license [place]      Assessment/Plan:    Problem List Items Addressed This Visit        Other    Adjustment disorder - Primary    Schizoaffective disorder, bipolar type ([facility] [address] )          Goals addressed in session: Goal 1          Reason for visit is   Chief Complaint   Patient presents with    Virtual Regular Visit        Encounter provider OFE Santana    Provider located at [address] [place] [id number] [phone]      Recent Visits  No visits were found meeting these conditions  Showing recent visits within past 7 days and meeting all other requirements  Future Appointments  No visits were found meeting these conditions  Showing future appointments within next 150 days and meeting all other requirements       The patient was identified by name and date of birth  Rowena Ceballos was informed that this is a telemedicine visit and that the visit is being conducted through[employer] and patient was informed that this is a secure, HIPAA-compliant platform  She agrees to proceed     My office door was closed  No one else was in the room  She acknowledged consent and understanding of privacy and security of the video platform  The patient has agreed to participate and understands they can discontinue the visit at any time  Patient is aware this is a billable service  Subjective  Rowena Ceballos is a 27 y o  female presenting for follow up  Clinton Lozano shared that she has been feeling lonely lately, realizing that she wants a partner in her life, and has gone back to online dating    She said that she does not have many friends, no one that she is particularly close to, and does not necessarily want friendships at this point, but a relationship  She talked about "what if" thinking in regards to what could go wrong with a man, such as if he is , he will be deployed for two years and she will be left alone  Discussed this thought process and how she can reframe those thoughts or try to be more open minded as to her own resilience and how she might successfully manage situations that come up  She also talked a little bit about her [employer], and said that others at the [employer] would watch her, making it noticeable to her that they could tell she was attracted to one of the deacons  Because she felt uncomfortable with that, she stopped going to [employer] for a bit, but decided that she would go back  Kyle Dubin said that it gave her some "peace" to think about things in a different way  She reports work going well, although it is tiring, but she is enjoying it a lot  She has not seen family lately but again is ok with that, as she likes to stay home and enjoy time outside of work just relaxing and doing what she wants  Provided support and validation of Johanna's choices, and encouraged her to continue to make choices for her life that feel good for her instead of what she thinks others expect her to do  A: Kyle Dubin presented as generally euthymic today, with a bright affect, good eye contact and calm behavior  Her concentration was intact, thought process tangential at times  Content somewhat paranoid  Denies SI HI and psychosis  Continued progress toward goals  P: Kyle Dubin will work on changing thought process regarding taking a risk with relationships, will return to [employer] as desired, and will return in one week for follow up        HPI     Past Medical History:   Diagnosis Date    Acid reflux     Schizoaffective disorder [facility])        Past Surgical History:   Procedure Laterality Date    CHOLECYSTECTOMY LAPAROSCOPIC N/A 11/17/2020    Procedure: Laparoscopic cholecystectomy;  Surgeon: Gina Marie MD;  [facility];  Service: General Current Outpatient Medications   Medication Sig Dispense Refill    acetaminophen (TYLENOL) 325 mg tablet Take 2 tablets (650 mg total) by mouth every 6 (six) hours as needed (mild pain) (Patient not taking: Reported on 10/20/2021)      amLODIPine (NORVASC) 5 mg tablet       ARIPiprazole (ABILIFY) 10 mg tablet Take 1/2 tab (5mg) PO daily 30 tablet 2    benztropine (COGENTIN) 0 5 mg tablet Take 1 tablet (0 5 mg total) by mouth 2 (two) times a day 60 tablet 2    FeroSul 325 (65 Fe) MG tablet Take 1 tablet by mouth daily with breakfast      ferrous sulfate 325 (65 Fe) mg tablet Take 325 mg by mouth 2 (two) times a day      hydrochlorothiazide (HYDRODIURIL) 12 5 mg tablet       ibuprofen (MOTRIN) 200 mg tablet Take 2 tablets (400 mg total) by mouth every 6 (six) hours as needed for mild pain or headaches (Take with food ) 30 tablet 0    loratadine (CLARITIN) 10 mg tablet       Multiple Vitamin (multivitamin) tablet Take 1 tablet by mouth      norgestimate-ethinyl estradiol (ORTHO TRI-CYCLEN LO) 0 18/0 215/0 25 MG-25 MCG per tablet Take 1 tablet by mouth daily      norgestimate-ethinyl estradiol (ORTHO TRI-CYCLEN LO) 0 18/0 215/0 25 MG-25 MCG per tablet Take 1 tablet by mouth daily      omeprazole (PriLOSEC) 40 MG capsule       ondansetron (ZOFRAN-ODT) 4 mg disintegrating tablet Take 1 tablet (4 mg total) by mouth every 6 (six) hours as needed for nausea or vomiting (Patient not taking: Reported on 10/20/2021) 20 tablet 0     No current facility-administered medications for this visit  Allergies   Allergen Reactions    Other      Seasonal allergy         Review of Systems    Video Exam    There were no vitals filed for this visit  Physical Exam     I spent 48 minutes directly with the patient during this visit    VIRTUAL VISIT DISCLAIMER    Michael Crenshaw verbally agrees to participate in [employer]   Pt is aware that Virtual Care Services could be limited without vital signs or the ability to perform a full hands-on physical Kaia Sosa understands she or the provider may request at any time to terminate the video visit and request the patient to seek care or treatment in person

## 2022-02-09 ENCOUNTER — TELEPHONE (OUTPATIENT)
Dept: PSYCHIATRY | Facility: CLINIC | Age: 27
End: 2022-02-09

## 2022-02-23 ENCOUNTER — TELEMEDICINE (OUTPATIENT)
Dept: BEHAVIORAL/MENTAL HEALTH CLINIC | Facility: CLINIC | Age: 27
End: 2022-02-23
Payer: COMMERCIAL

## 2022-02-23 DIAGNOSIS — F25.0 SCHIZOAFFECTIVE DISORDER, BIPOLAR TYPE (HCC): Primary | ICD-10-CM

## 2022-02-23 DIAGNOSIS — F43.20 ADJUSTMENT DISORDER, UNSPECIFIED TYPE: ICD-10-CM

## 2022-02-23 PROCEDURE — 90832 PSYTX W PT 30 MINUTES: CPT | Performed by: PSYCHIATRY & NEUROLOGY

## 2022-02-23 NOTE — PSYCH
Session time [id number]    Virtual Regular Visit    Verification of patient location:    Patient is located in the following state in which I hold an active license [place]      Assessment/Plan:    Problem List Items Addressed This Visit        Other    Adjustment disorder    Schizoaffective disorder, bipolar type ([facility] [address] ) - Primary          Goals addressed in session: Goal 1          Reason for visit is   Chief Complaint   Patient presents with    Virtual Regular Visit        Encounter provider OFE Quiles    Provider located at [address] [place] [id number] [phone]      Recent Visits  No visits were found meeting these conditions  Showing recent visits within past 7 days and meeting all other requirements  Today's Visits  Date Type Provider Dept   02/23/22 Telemedicine [facility] Therapist [place]   Showing today's visits and meeting all other requirements  Future Appointments  No visits were found meeting these conditions  Showing future appointments within next 150 days and meeting all other requirements       The patient was identified by name and date of birth  Juanis Retana was informed that this is a telemedicine visit and that the visit is being conducted through[facility] and patient was informed this is a secure, HIPAA-complaint platform  She agrees to proceed     My office door was closed  No one else was in the room  She acknowledged consent and understanding of privacy and security of the video platform  The patient has agreed to participate and understands they can discontinue the visit at any time  Patient is aware this is a billable service  Subjective  Juanis Retana is a 27 y o  female presenting for follow up  Lane Kaplan shared that she is doing "ok" right now, feeling that her mood is stable and her anxiety is under control    She reports being a little anxious recently due to taking her Praxis exam, but now that it is over, she is no longer anxious about it (she did not pass, plans to take it again in a couple of months, feels more confident now that she is more familiar with test and content)  Work continues to go well, other than not paying enough and creating some financial strain  Romeo Carcamo said that she lives paycheck to [employer], so 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, 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  Otherwise, she said that she has mainly been spending time at home, 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  She still does not go out and socialize much, but said that she is fine with that  Encouraged Romeo Carcamo to continue to engage in activities that she finds pleasurable and try to find a healthy balance between work, school and leisure  A: Romeo Carcamo presented as "tired" today, with a mood-congruent affect, good eye contact and somewhat slowed behavior  Her speech was quiet, but normal rate and fluency  Concentration intact, thought process logical and organized  Insight and judgment intact  Denies SI HI and psychosis  Good progress toward goals  Harris Arredondo will work on balance in her life, and will continue to use coping strategies as needed  Sessions will be spread out to once per month, since she has been stable recently, with understanding that she can increase frequency again should she need to        HPI     Past Medical History:   Diagnosis Date    Acid reflux     Schizoaffective disorder ([facility] [address] )        Past Surgical History:   Procedure Laterality Date    CHOLECYSTECTOMY LAPAROSCOPIC N/A 11/17/2020    Procedure: Laparoscopic cholecystectomy;  Surgeon: Mary Jane Amato MD;  [facility];  Service: General       Current Outpatient Medications   Medication Sig Dispense Refill    acetaminophen (TYLENOL) 325 mg tablet Take 2 tablets (650 mg total) by mouth every 6 (six) hours as needed (mild pain) (Patient not taking: Reported on 10/20/2021)      amLODIPine (NORVASC) 5 mg tablet       ARIPiprazole (ABILIFY) 10 mg tablet Take 1/2 tab (5mg) PO daily 30 tablet 2    benztropine (COGENTIN) 0 5 mg tablet Take 1 tablet (0 5 mg total) by mouth 2 (two) times a day 60 tablet 2    FeroSul 325 (65 Fe) MG tablet Take 1 tablet by mouth daily with breakfast      ferrous sulfate 325 (65 Fe) mg tablet Take 325 mg by mouth 2 (two) times a day      hydrochlorothiazide (HYDRODIURIL) 12 5 mg tablet       ibuprofen (MOTRIN) 200 mg tablet Take 2 tablets (400 mg total) by mouth every 6 (six) hours as needed for mild pain or headaches (Take with food ) 30 tablet 0    loratadine (CLARITIN) 10 mg tablet       Multiple Vitamin (multivitamin) tablet Take 1 tablet by mouth      norgestimate-ethinyl estradiol (ORTHO TRI-CYCLEN LO) 0 18/0 215/0 25 MG-25 MCG per tablet Take 1 tablet by mouth daily      norgestimate-ethinyl estradiol (ORTHO TRI-CYCLEN LO) 0 18/0 215/0 25 MG-25 MCG per tablet Take 1 tablet by mouth daily      omeprazole (PriLOSEC) 40 MG capsule       ondansetron (ZOFRAN-ODT) 4 mg disintegrating tablet Take 1 tablet (4 mg total) by mouth every 6 (six) hours as needed for nausea or vomiting (Patient not taking: Reported on 10/20/2021) 20 tablet 0     No current facility-administered medications for this visit  Allergies   Allergen Reactions    Other      Seasonal allergy         Review of Systems    Video Exam    There were no vitals filed for this visit  Physical Exam     I spent 24 minutes directly with the patient during this visit    VIRTUAL VISIT DISCLAIMER    Tani Banegas verbally agrees to participate in [employer]   Pt is aware that [employer] could be limited without vital signs or the ability to perform a full hands-on physical [place]ne [place] understands she or the provider may request at any time to terminate the video visit and request the patient to seek care or treatment in person

## 2022-03-09 ENCOUNTER — TELEMEDICINE (OUTPATIENT)
Dept: PSYCHIATRY | Facility: CLINIC | Age: 27
End: 2022-03-09

## 2022-03-09 DIAGNOSIS — M62.838 MUSCLE SPASM: ICD-10-CM

## 2022-03-09 DIAGNOSIS — F25.0 SCHIZOAFFECTIVE DISORDER, BIPOLAR TYPE (HCC): ICD-10-CM

## 2022-03-09 RX ORDER — BENZTROPINE MESYLATE 0.5 MG/1
0.5 TABLET ORAL 2 TIMES DAILY
Qty: 60 TABLET | Refills: 2 | Status: SHIPPED | OUTPATIENT
Start: 2022-03-09 | End: 2022-05-11 | Stop reason: SDUPTHER

## 2022-03-09 RX ORDER — ARIPIPRAZOLE 10 MG/1
TABLET ORAL
Qty: 30 TABLET | Refills: 2 | Status: SHIPPED | OUTPATIENT
Start: 2022-03-09 | End: 2022-05-11 | Stop reason: SDUPTHER

## 2022-03-09 NOTE — PSYCH
Psychiatric Medication Management - [address] [facility] 27 y o  female MRN: [id number]    Virtual Regular Visit    [place] of [place] location[place]    Patient is located in the following state in which I hold an active license: [place]    Reason for visit is: Medication Management    Encounter provider Gaby Russell MD    Provider located at [address] [id number] [phone]      Recent Visits  No visits were found meeting these conditions  Showing recent visits within past 7 days and meeting all other requirements  Today's Visits  Date Type Provider Dept   03/09/22 [address] today's visits and meeting all other requirements  Future Appointments  No visits were found meeting these conditions  Showing future appointments within next 150 days and meeting all other requirements       The patient was identified by name and date of birth  Michael Crenshaw was informed that this is a telemedicine visit and that the visit is being conducted through [address] [place] [address] Now and patient was informed that this is a secure, HIPAA-compliant platform  She agrees to proceed    My office door was closed  No one else was in the room  She acknowledged consent and understanding of privacy and security of the video platform  The patient has agreed to participate and understands they can discontinue the visit at any time  Patient is aware this is a billable service  Video Exam    There were no vitals filed for this visit  HPI:  Patient reports compliance with her medications and denies any current side effects  Patient reports improvement of her muscle spasms with Cogentin  Pt denies any current stressors, other than her upcoming license exam  She continues to enjoy reading, listening to music and eating   Pt reports good sleep, energy, concentration and appetite  Pt denies any feelings of guilt or hopelessness  She also denies any  Pt denies any suicidal ideations, intent or plan  Review Of Systems:     Constitutional Negative   ENT Negative   Cardiovascular Negative   Respiratory Negative   Gastrointestinal Negative   Genitourinary Negative   Musculoskeletal Negative   Integumentary Negative   Neurological Negative   Endocrine Negative     Past Medical History:   Patient Active Problem List   Diagnosis    Adjustment disorder    Schizoaffective disorder, bipolar type ([facility] [address] )    Benign essential hypertension    S/P laparoscopic cholecystectomy    Muscle spasm       Allergies: Allergies   Allergen Reactions    Other      Seasonal allergy         Past Surgical History:   Past Surgical History:   Procedure Laterality Date    CHOLECYSTECTOMY LAPAROSCOPIC N/A 11/17/2020    Procedure: Laparoscopic cholecystectomy;  Surgeon: Duane Moreno MD;  [facility];  Service: General       Family Psychiatric History:      Maternal grandfather: herve     Past Psychiatric History:      Past Inpatient Psychiatric Treatment:   Once, in 2011 at [facility] for paranoid and bizarre behavior (1 5 weeks)  Past Outpatient Psychiatric Treatment:    - [address] [place] [address] in 2011 with Dr Filiberto Mc (psychiatrist) and Fred Randle (therapists)  - [facility]  with Samuel Bishop NP and Denzel Cortés previously, currently therapy with Nahun Scott  Past Suicide Attempts: no  Past Violent Behavior: no  Past Psychiatric Medication Trials: Risperdal (only took for 1-2 weeks, did not like how she felt on it), cogentin, Abilify           Substance Abuse History:     No history of ETOH, illict substance, or tobacco abuse  No past legal actions or arrests secondary to substance intoxication  The patient denies prior DWIs/DUIs  No history of outpatient/inpatient rehabilitation programs   Richie Dale does not exhibit objective evidence of substance withdrawal during today's examination nor does Geri appear under the influence of any psychoactive substance           Social History:  Born in [facility], migrated to [address] at age 7 or 4  Developmental: Denies a history of milestone/developmental delay  Denies a history of in-utero exposure to toxins/illicit substances  There is no documented history of IEP or need for special education  Parents: mother alive, lives in [place] (moving to [facility] in a few weeks  Father  at age 6/10  Siblings: 3 brothers (25, 32, 27)  Education: graduating with Masters degree in Education in the fall  Marital history: single  Living arrangement, social support: Lives alone  Good social support from mother or elder brother  Occupational History:  at [employer] to firearms: Denies direct access to weapons/firearms  Dane Marquez has no history of arrests or violence with a deadly weapon  Hx of  service: denies  Prior incarcerations or legal issues: denies  Synagogue affiliation: Zoroastrian (practicing)     Traumatic History:      Abuse:none is reported  Other Traumatic Events: denies  Pt's father  from an unknown disease [place] when she was 8, but she states it was not traumatic to her  The following portions of the patient's history were reviewed and updated as appropriate: allergies, current medications, past family history, past medical history, past social history, past surgical history and problem list     Objective: There were no vitals filed for this visit        Weight (last 2 days)     None          Mental status:  Appearance sitting comfortably in chair, dressed in casual clothing, adequate hygiene and grooming, cooperative with interview, good eye contact   Mood "good"   Affect Appears generally euthymic, stable, mood-congruent   Speech Normal rate, rhythm, and volume   Thought Processes Linear and goal directed   Associations intact associations   Hallucinations Denies any auditory or visual hallucinations   Thought Content No passive or active suicidal or homicidal ideation, intent, or plan  Orientation Oriented to person, place, time, and situation   Recent and Remote Memory Grossly intact   Attention Span and Concentration Concentration intact   Intellect Appears to be of Average Intelligence   Insight Insight intact   Judgement judgment was intact   Muscle Strength Muscle strength and tone were normal   Language Within normal limits   Fund of Knowledge Age appropriate   Pain None         Assessment/Plan:   Pt is 26 y/o F, PPH of Schizoaffective disorder, bipolar type, 1 past [facility] admission in 2011 for a manic and psychotic episode, no past suicide attempts, stable on Abilify 5 mg with no recent episodes of psychosis or hypomania/jonnathan since 2011  Pt  Is high functioning; works as a  and is a few months away from earning her Masters degree  She denies any current suicidal ideations, intent or plan and has good social support from her mother and elder brother  Diagnoses and all orders for this visit:    Schizoaffective disorder, bipolar type ([facility] [address] )    Muscle spasm          Diagnosis:      Treatment Recommendations:    · Continue Abilify 5 mg for mood stabilization  · Continue Cogentin 0 5 mg po b i d for EPS symptoms  · Continue psychotherapy with Sebastian Tang at [facility]  · Medical- F/u with primary care provider for on-going medical care  · Follow-up with this provider in 2 months  Risks, Benefits And Possible Side Effects Of Medications:  Risks, benefits, and possible side effects of medications explained to patient and family, they verbalize understanding    Controlled Medication Discussion: No records found for controlled prescriptions according to Frida Nina [address]       Psychotherapy Provided: Supportive psychotherapy provided  Yes  Counseling was provided during the session today for 16 minutes            I spent 30 minutes directly with the patient during this visit    VIRTUAL VISIT DISCLAIMER      Harinder Whitekaila verbally agrees to participate in [employer]  Pt is aware that [employer] could be limited without vital signs or the ability to perform a full hands-on physical exam @ understands she or the provider may request at any time to terminate the video visit and request the patient to seek care or treatment in person

## 2022-04-04 ENCOUNTER — TELEPHONE (OUTPATIENT)
Dept: PSYCHIATRY | Facility: CLINIC | Age: 27
End: 2022-04-04

## 2022-04-04 NOTE — TELEPHONE ENCOUNTER
Pt called to rescheduled same day on 4/4/22 due to still at work  Rescheduled for 4/14/22 @ 4 virtual visit   She also rescheduled her 5/4/22 apt due to having another appt  Rescheduled for on 6/8/22 at 6

## 2022-04-14 ENCOUNTER — TELEMEDICINE (OUTPATIENT)
Dept: BEHAVIORAL/MENTAL HEALTH CLINIC | Facility: CLINIC | Age: 27
End: 2022-04-14
Payer: COMMERCIAL

## 2022-04-14 DIAGNOSIS — F25.0 SCHIZOAFFECTIVE DISORDER, BIPOLAR TYPE (HCC): Primary | ICD-10-CM

## 2022-04-14 PROCEDURE — 90832 PSYTX W PT 30 MINUTES: CPT | Performed by: PSYCHIATRY & NEUROLOGY

## 2022-04-14 NOTE — PSYCH
Session time 4694-9472 (total time 26 minutes)    Virtual Regular Visit    Verification of patient location:    Patient is located in the following state in which I hold an active license [place]      Assessment/Plan:    Problem List Items Addressed This Visit        Other    Schizoaffective disorder, bipolar type ([facility] [address] ) - Primary          Goals addressed in session: Goal 1          Reason for visit is   Chief Complaint   Patient presents with    Virtual Regular Visit        Encounter provider [facility]    Provider located at [address] [place] [id number] [phone]      Recent Visits  No visits were found meeting these conditions  Showing recent visits within past 7 days and meeting all other requirements  Today's Visits  Date Type Provider Dept   04/14/22 60 [facility] Therapist Oliver   Showing today's visits and meeting all other requirements  Future Appointments  No visits were found meeting these conditions  Showing future appointments within next 150 days and meeting all other requirements       The patient was identified by name and date of birth  Brenton Sterling was informed that this is a telemedicine visit and that the visit is being conducted throughic Embedded and patient was informed this is a secure, HIPAA-complaint platform  She agrees to proceed     My office door was closed  No one else was in the room  She acknowledged consent and understanding of privacy and security of the video platform  The patient has agreed to participate and understands they can discontinue the visit at any time  Patient is aware this is a billable service  Subjective  Brenton Sterling is a 27 y o  female presenting for follow up  Karen De La Cruz shared that she has mainly been doing well, with her moods reportedly being stable and only some occasional anxiety    She shared that two 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 got in a fight recently and she was afraid that she was going to be fired, so she was very anxious about that  However, another teacher talked to her and told her that it takes a lot to get fired, and told her not to worry, so she has felt better since then  She said that otherwise work has been going well  Socially, she has not been reaching out at all and has turned down invitations to go out after work, as she is tired at the end of the day and "I just want my alone time "  She said that she has decided that she does not need friends because "they bring their drama, and I am at peace right now and don't need that "  She notes that she is content with the way things are, just working, 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, but said that she wonders if she will feel differently after she has less on her plate after graduation in December  Encouraged Sancho Vasquez to focus on self-care and taking time for rest as she does have a lot of work right now  Sancho Vasquez said that she is looking forward to the summer when she will have about a month of nothing to do before her summer classes start  Encouraged her to make use of that time to visit family, do something fun and to focus on self-care  A: Sancho Vasquez presented as calm and generally euthymic today, with a relatively bright affect, good eye contact and normal behavior  Her concentration was intact, thought process linear and organized  Speech normal volume rate and fluency  Insight and judgment intact  Denies SI HI and psychosis  Good progress toward goals  P: Sancho Vasquez will focus on self-care and rest, along with work-life balance, and will return in three weeks for follow up as scheduled        HPI     Past Medical History:   Diagnosis Date    Acid reflux     Schizoaffective disorder [facility])        Past Surgical History:   Procedure Laterality Date    CHOLECYSTECTOMY LAPAROSCOPIC N/A 11/17/2020    Procedure: Laparoscopic cholecystectomy; Surgeon: Bony Covington MD;  Location[facility] MAIN [facility];  Service: General       Current Outpatient Medications   Medication Sig Dispense Refill    acetaminophen (TYLENOL) 325 mg tablet Take 2 tablets (650 mg total) by mouth every 6 (six) hours as needed (mild pain) (Patient not taking: Reported on 10/20/2021)      amLODIPine (NORVASC) 5 mg tablet       ARIPiprazole (ABILIFY) 10 mg tablet Take 1/2 tab (5mg) PO daily 30 tablet 2    benztropine (COGENTIN) 0 5 mg tablet Take 1 tablet (0 5 mg total) by mouth 2 (two) times a day 60 tablet 2    FeroSul 325 (65 Fe) MG tablet Take 1 tablet by mouth daily with breakfast      ferrous sulfate 325 (65 Fe) mg tablet Take 325 mg by mouth 2 (two) times a day      hydrochlorothiazide (HYDRODIURIL) 12 5 mg tablet       ibuprofen (MOTRIN) 200 mg tablet Take 2 tablets (400 mg total) by mouth every 6 (six) hours as needed for mild pain or headaches (Take with food ) 30 tablet 0    loratadine (CLARITIN) 10 mg tablet       Multiple Vitamin (multivitamin) tablet Take 1 tablet by mouth      norgestimate-ethinyl estradiol (ORTHO TRI-CYCLEN LO) 0 18/0 215/0 25 MG-25 MCG per tablet Take 1 tablet by mouth daily      norgestimate-ethinyl estradiol (ORTHO TRI-CYCLEN LO) 0 18/0 215/0 25 MG-25 MCG per tablet Take 1 tablet by mouth daily      omeprazole (PriLOSEC) 40 MG capsule       ondansetron (ZOFRAN-ODT) 4 mg disintegrating tablet Take 1 tablet (4 mg total) by mouth every 6 (six) hours as needed for nausea or vomiting (Patient not taking: Reported on 10/20/2021) 20 tablet 0     No current facility-administered medications for this visit  Allergies   Allergen Reactions    Other      Seasonal allergy         Review of Systems    Video Exam    There were no vitals filed for this visit  Physical Exam     I spent 28 minutes directly with the patient during this visit    VIRTUAL VISIT DISCLAIMER    Todmarcus Skinny verbally agrees to participate in [employer]   Pt is aware that Virtual Care Services could be limited without vital signs or the ability to perform a full hands-on physical Rubbie Sj understands she or the provider may request at any time to terminate the video visit and request the patient to seek care or treatment in person

## 2022-04-14 NOTE — BH TREATMENT PLAN
Negar Babb  1995       Date of Initial Treatment Plan: 4/16/2020    Date of Current Treatment Plan: 04/14/22    Treatment Plan Number 5    Strengths/Personal Resources for Self Care: "I am kind  I have empathy  I am compassionate  I am curious and a good listener "      Diagnosis:   1  Schizoaffective disorder, bipolar type ([facility] [address] )         Area of Needs: "I want to be reflective  I want to be aware of that I not only do things for them, but they do things for me "      Long Term Goal 1: A I want to continue to be more reflective and to better understand a situation before I respond to it  B I will consider the "proof" I have of the concerning thoughts I have    Target Date:  8/14/2022  Completion Date: n/a          Short Term Objectives for Goal 1: AI want to remain aware of my responsibilty to manage my personal opinions  B  I will challenge my troublesome thoughts with fact or other possible alternatives  C: I want to view my mistakes as learning experiences  D  I will maintain my awareness of the importance of self care  Long Term Goal 2: I would like to do better socially  Target Date:  8/14/2022  Completion Date: n/a    Short Term Objectives for Goal 2:  A: I will give myself time to think before answering someone  B: I will work on being more present in a conversation instead of worrying about what I am going to say  C: I will try to focus on my thoughts instead of my emotions, in order to be able focus more on the conversation  GOAL 1: Modality: Individual 2x per month   Completion Date n/a and The person(s) responsible for carrying out the plan is  Geri     Goal 2: Modality: Individual 2x per month  Completion Date n/a      Behavioral Health Treatment Plan [facility]: Diagnosis and Treatment Plan explained to Sierra Weiss relates understanding diagnosis and is agreeable to Treatment Plan   Yes    Client Comments : Please share your thoughts, feelings, need and/or experiences regarding your treatment plan: n/a    Myranda Elizondo, 1995, actively participated in the review and update of this treatment plan during a virtual session, using the [address]  Bylas Elizondo  provided verbal consent on 4/14/2022 at 1625 PM  The treatment plan was transcribed into the [employer] Record at a later time

## 2022-05-11 ENCOUNTER — TELEMEDICINE (OUTPATIENT)
Dept: PSYCHIATRY | Facility: CLINIC | Age: 27
End: 2022-05-11

## 2022-05-11 DIAGNOSIS — F43.20 ADJUSTMENT DISORDER, UNSPECIFIED TYPE: ICD-10-CM

## 2022-05-11 DIAGNOSIS — M62.838 MUSCLE SPASM: ICD-10-CM

## 2022-05-11 DIAGNOSIS — F25.0 SCHIZOAFFECTIVE DISORDER, BIPOLAR TYPE (HCC): Primary | ICD-10-CM

## 2022-05-11 RX ORDER — BENZTROPINE MESYLATE 0.5 MG/1
0.5 TABLET ORAL 2 TIMES DAILY
Qty: 60 TABLET | Refills: 2 | Status: SHIPPED | OUTPATIENT
Start: 2022-05-11 | End: 2022-08-10 | Stop reason: SDUPTHER

## 2022-05-11 RX ORDER — ARIPIPRAZOLE 10 MG/1
TABLET ORAL
Qty: 30 TABLET | Refills: 2 | Status: SHIPPED | OUTPATIENT
Start: 2022-05-11 | End: 2022-08-10 | Stop reason: SDUPTHER

## 2022-05-11 NOTE — PSYCH
Psychiatric Medication Management - [address] [facility] 27 y o  female MRN: [id number]    Virtual Regular Visit    Verification of [place] location: [place]    Patient is located in the following state in which I hold an active license: [place]    Reason for visit is: Medication Management    Encounter provider Mike Clayton MD    Provider located at [address] [id number] [phone]      Recent Visits  No visits were found meeting these conditions  Showing recent visits within past 7 days and meeting all other requirements  Future Appointments  No visits were found meeting these conditions  Showing future appointments within next 150 days and meeting all other requirements       The patient was identified by name and date of birth  Lyn Wall was informed that this is a telemedicine visit and that the visit is being conducted through [address] Now and patient was informed that this is a secure, HIPAA-compliant platform  She agrees to proceed    My office door was closed  No one else was in the room  She acknowledged consent and understanding of privacy and security of the video platform  The patient has agreed to participate and understands they can discontinue the visit at any time  Patient is aware this is a billable service  Video Exam    There were no vitals filed for this visit  HPI:  Patient reports compliance with her medications and denies any current side effects  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 and is excited to also almost be done with her masters degree at the end of the year  Pt is looking forward to having off for the summer, and although the end of term is hectic, she states she is handling the stress well  She continues to enjoy reading, listening to music and eating   She reports good sleep, energy, concentration and appetite and denies any feelings of guilt or hopelessness  Pt denies any symptoms of jonnathan or psychosis and denies any suicidal ideations, intent or plan  Pt denies any suicidal ideations, intent or plan  Review Of Systems:     Constitutional Negative   ENT Negative   Cardiovascular Negative   Respiratory Negative   Gastrointestinal Negative   Genitourinary Negative   Musculoskeletal Negative   Integumentary Negative   Neurological Negative   Endocrine Negative     Past Medical History:   Patient Active Problem List   Diagnosis    Adjustment disorder    Schizoaffective disorder, bipolar type ([address] )    Benign essential hypertension    S/P laparoscopic cholecystectomy    Muscle spasm       Allergies: Allergies   Allergen Reactions    Other      Seasonal allergy         Past Surgical History:   Past Surgical History:   Procedure Laterality Date    CHOLECYSTECTOMY LAPAROSCOPIC N/A 11/17/2020    Procedure: Laparoscopic cholecystectomy;  Surgeon: Gretchen Norman MD;  [facility] MA[facility];  Service: General       Family Psychiatric History:      Maternal grandfather: herve     Past Psychiatric History:      Past Inpatient Psychiatric Treatment:   Once, in 2011 at [facility] for paranoid and bizarre behavior (1 5 weeks)  Past Outpatient Psychiatric Treatment:    - [address] [place] [address] in 2011 with Dr Tracey Holden (psychiatrist) and David Mccray (therapists)  - [facility]  with Bowen Trevino NP and Arjun Hardin previously, currently therapy with Adolfo Beltran  Past Suicide Attempts: no  Past Violent Behavior: no  Past Psychiatric Medication Trials: Risperdal (only took for 1-2 weeks, did not like how she felt on it), cogentin, Abilify           Substance Abuse History:     No history of ETOH, illict substance, or tobacco abuse  No past legal actions or arrests secondary to substance intoxication  The patient denies prior DWIs/DUIs   No history of outpatient/inpatient rehabilitation programs  Geri does not exhibit objective evidence of substance withdrawal during today's examination nor does Geri appear under the influence of any psychoactive substance           Social History:  Born in [place] [facility] [facility], migrated to [address] at age 7 or 6    Developmental: Denies a history of milestone/developmental delay  Denies a history of in-utero exposure to toxins/illicit substances  There is no documented history of IEP or need for special education  Parents: mother alive, lives in [place] (moving to [place] in a few weeks  Father  at age 6/10  Siblings: 3 brothers (25, 32, 27)  [address] with Masters degree in Education in the fall  Marital history: single  Living arrangement, social support: Lives alone  Good social support from mother or elder brother  Occupational History:  at [employer] school  Access to firearms: Denies direct access to weapons/firearms  Geri Hendricks has no history of arrests or violence with a deadly weapon  Hx of  service: denies  Prior incarcerations or legal issues: denies  [employer] affiliation: Isha Driver (practicing)     Traumatic History:      Abuse:none is reported  Other Traumatic Events: denies  Pt's father  from an unknown disease in [place] when she was 8, but she states it was not traumatic to her  The following portions of the patient's history were reviewed and updated as appropriate: allergies, current medications, past family history, past medical history, past social history, past surgical history and problem list     Objective: There were no vitals filed for this visit        Weight (last 2 days)     None          Mental status:  Appearance sitting comfortably in chair, dressed in casual clothing, adequate hygiene and grooming, cooperative with interview, good eye contact   Mood "good"   Affect Appears generally euthymic, stable, mood-congruent   Speech Normal rate, rhythm, and volume   Thought Processes Linear and goal directed   Associations intact associations Hallucinations Denies any auditory or visual hallucinations   Thought Content No passive or active suicidal or homicidal ideation, intent, or plan  Orientation Oriented to person, place, time, and situation   Recent and Remote Memory Grossly intact   Attention Span and Concentration Concentration intact   Intellect Appears to be of Average Intelligence   Insight Insight intact   Judgement judgment was intact   Muscle Strength Muscle strength and tone were normal   Language Within normal limits   Fund of Knowledge Age appropriate   Pain None         Assessment/Plan:     Pt is 28 y/o F, PPH of Schizoaffective disorder, bipolar type, 1 past [facility] admission in 2011 for a manic and psychotic episode, no past suicide attempts, stable on Abilify 5 mg with no recent episodes of psychosis or hypomania/jonnathan since 2011  Pt  Is high functioning; works as a  and is a few months away from earning her Masters degree  She denies any current symptoms of jonnathan, psychosis or suicidal ideations, intent or plan  She has good social support from her mother and elder brother  Diagnoses and all orders for this visit:    Schizoaffective disorder, bipolar type ([facility] [address] )    Adjustment disorder, unspecified type          Diagnosis:      Treatment Recommendations:    · Continue Abilify 5 mg for mood stabilization  · Continue Cogentin 0 5 mg po b i d for EPS symptoms  · Continue psych[facility] with Jules Kaufman at [facility]  · Medical- F/u with primary care provider for on-going medical care  · Follow-up with this provider in 3 months        Risks, Benefits And Possible Side Effects Of Medications:  Risks, benefits, and possible side effects of medications explained to patient and family, they verbalize understanding    Controlled Medication Discussion: No records found for controlled prescriptions according to [address]       Psychotherapy Provided: Supportive psychotherapy provided  Yes  Counseling was provided during the session today for 16 minutes  I spent 30 minutes directly with the patient during this visit    VIRTUAL VISIT DISCLAIMER      Norma Sherwood verbally agrees to participate in [employer]  Pt is aware that [employer] could be limited without vital signs or the ability to perform a full hands-on physical exam @ understands she or the provider may request at any time to terminate the video visit and request the patient to seek care or treatment in person

## 2022-05-12 ENCOUNTER — TELEPHONE (OUTPATIENT)
Dept: PSYCHIATRY | Facility: CLINIC | Age: 27
End: 2022-05-12

## 2022-05-12 NOTE — TELEPHONE ENCOUNTER
Patient left vm regarding an appiontment on fd line and requested a call back  Writer called patient and left a message

## 2022-05-12 NOTE — TELEPHONE ENCOUNTER
Called patient at 3:45 to schedule a follow up with Dr Anabela Grossman  Patient requested to cancel her 4:00 appointment with Berto Link due to not feeling well  Patient was very concerned that this late cancellation would count as a no show  I informed her of her next appointment date and time  If possible, the patient would like to reschedule today's appointment

## 2022-06-08 ENCOUNTER — TELEMEDICINE (OUTPATIENT)
Dept: BEHAVIORAL/MENTAL HEALTH CLINIC | Facility: CLINIC | Age: 27
End: 2022-06-08
Payer: COMMERCIAL

## 2022-06-08 DIAGNOSIS — F25.0 SCHIZOAFFECTIVE DISORDER, BIPOLAR TYPE (HCC): ICD-10-CM

## 2022-06-08 DIAGNOSIS — F43.20 ADJUSTMENT DISORDER, UNSPECIFIED TYPE: Primary | ICD-10-CM

## 2022-06-08 PROCEDURE — 90834 PSYTX W PT 45 MINUTES: CPT | Performed by: PSYCHIATRY & NEUROLOGY

## 2022-06-08 NOTE — PSYCH
Session time [id number] (total time 39 minutes)    Virtual Regular Visit    Verification of patient location:    Patient is located in the following state in which I hold an active license [place]      Assessment/Plan:    Problem List Items Addressed This Visit        Other    Adjustment disorder - Primary    Schizoaffective disorder, bipolar type ([facility] [address] )          Goals addressed in session: Goal 1          Reason for visit is   Chief Complaint   Patient presents with    Virtual Regular Visit        Encounter provider [facility]    Provider located at [address] [place] [id number] [phone]      Recent Visits  No visits were found meeting these conditions  Showing recent visits within past 7 days and meeting all other requirements  Future Appointments  No visits were found meeting these conditions  Showing future appointments within next 150 days and meeting all other requirements       The patient was identified by name and date of birth  Belle Da Silva was informed that this is a telemedicine visit and that the visit is being conducted throughEpic Embedded and patient was informed this is a secure, HIPAA-complaint platform  She agrees to proceed     My office door was closed  No one else was in the room  She acknowledged consent and understanding of privacy and security of the video platform  The patient has agreed to participate and understands they can discontinue the visit at any time  Patient is aware this is a billable service  Subjective  Belle Da Silva is a 27 y o  female presenting for follow up  Savannah Millan shared that she has been doing well, 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, and preparing 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 week  She will also be taking two grad classes over the summer and 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, and will be done with her master's degree by December    She said that she is considering going on to get a doctorate, but is still weighing pros and cons  She said that she gets bored easily and until she is  and has a family, she wants to make sure she keeps busy  She also loves learning and thinks that it would be enjoyable for her  She also talked about not going out socially, having no friends in the area, although she gets along with coworkers at school  She is considering going back to online dating but is hesitant since it did not really work out in the past, and said that she is very much a homebody, not really desiring to go out  Discussed pros and cons of school, dating, etc, and how pushing herself outside of her comfort zone could help her achieve her goals and add to her quality of life  A: Dima Mercedes presented as euthymic and calm today, with a bright affect, good eye contact and normal behavior  Concentration was intact, thought process logical and organized  Insight and judgment intact  Denies SI HI and psychosis  Moderate progress toward goals  P: Dima Mercedes will consider ways to push herself to go out and connect socially, will consider the pros and cons of continuing her education, and will return in early July for follow up as scheduled         HPI     Past Medical History:   Diagnosis Date    Acid reflux     Schizoaffective disorder ([facility] [address] )        Past Surgical History:   Procedure Laterality Date    CHOLECYSTECTOMY LAPAROSCOPIC N/A 11/17/2020    Procedure: Laparoscopic cholecystectomy;  Surgeon: Nathanael Prescott MD;  Location[facility] MA[facility];  Service: General       Current Outpatient Medications   Medication Sig Dispense Refill    acetaminophen (TYLENOL) 325 mg tablet Take 2 tablets (650 mg total) by mouth every 6 (six) hours as needed (mild pain) (Patient not taking: Reported on 10/20/2021)      amLODIPine (NORVASC) 5 mg tablet       ARIPiprazole (ABILIFY) 10 mg tablet Take 1/2 tab (5mg) PO daily 30 tablet 2    benztropine (COGENTIN) 0 5 mg tablet Take 1 tablet (0 5 mg total) by mouth in the morning and 1 tablet (0 5 mg total) in the evening  60 tablet 2    FeroSul 325 (65 Fe) MG tablet Take 1 tablet by mouth daily with breakfast      ferrous sulfate 325 (65 Fe) mg tablet Take 325 mg by mouth 2 (two) times a day      hydrochlorothiazide (HYDRODIURIL) 12 5 mg tablet       ibuprofen (MOTRIN) 200 mg tablet Take 2 tablets (400 mg total) by mouth every 6 (six) hours as needed for mild pain or headaches (Take with food ) 30 tablet 0    loratadine (CLARITIN) 10 mg tablet       Multiple Vitamin (multivitamin) tablet Take 1 tablet by mouth      norgestimate-ethinyl estradiol (ORTHO TRI-CYCLEN LO) 0 18/0 215/0 25 MG-25 MCG per tablet Take 1 tablet by mouth daily      norgestimate-ethinyl estradiol (ORTHO TRI-CYCLEN LO) 0 18/0 215/0 25 MG-25 MCG per tablet Take 1 tablet by mouth daily      omeprazole (PriLOSEC) 40 MG capsule        No current facility-administered medications for this visit  Allergies   Allergen Reactions    Other      Seasonal allergy         Review of Systems    Video Exam    There were no vitals filed for this visit  Physical Exam     I spent 39 minutes directly with the patient during this visit    VIRTUAL VISIT DISCLAIMER    Tano Paniagua verbally agrees to participate in [employer]  Pt is aware that [employer] could be limited without vital signs or the ability to perform a full hands-on physical Agnes Ramirez understands she or the provider may request at any time to terminate the video visit and request the patient to seek care or treatment in person

## 2022-07-13 ENCOUNTER — TELEMEDICINE (OUTPATIENT)
Dept: BEHAVIORAL/MENTAL HEALTH CLINIC | Facility: CLINIC | Age: 27
End: 2022-07-13
Payer: COMMERCIAL

## 2022-07-13 DIAGNOSIS — F25.0 SCHIZOAFFECTIVE DISORDER, BIPOLAR TYPE (HCC): Primary | ICD-10-CM

## 2022-07-13 PROCEDURE — 90832 PSYTX W PT 30 MINUTES: CPT | Performed by: PSYCHIATRY & NEUROLOGY

## 2022-07-13 NOTE — PSYCH
Session time [phone] (total time 18 minutes)    Virtual Regular Visit    Verification of patient location:    Patient is located in the following state in which I hold an active license [place]      Assessment/Plan:    Problem List Items Addressed This Visit        Other    Schizoaffective disorder, bipolar type ([address] ) - Primary          Goals addressed in session: Goal 1          Reason for visit is   Chief Complaint   Patient presents with    Virtual Regular Visit        Encounter provider OFE Gutierrez    Provider located at [address] [place] [id number] [phone]      Recent Visits  No visits were found meeting these conditions  Showing recent visits within past 7 days and meeting all other requirements  Today's Visits  Date Type Provider Dept   07/13/22 Telemedicine [facility] [place]   Showing today's visits and meeting all other requirements  Future Appointments  No visits were found meeting these conditions  Showing future appointments within next 150 days and meeting all other requirements       The patient was identified by name and date of birth  Nataliia Arroyo was informed that this is a telemedicine visit and that the visit is being conducted through[employer] Emb[employer]ded and patient was informed this is a secure, HIPAA-complaint platform  She agrees to proceed     My office door was closed  No one else was in the room  She acknowledged consent and understanding of privacy and security of the video platform  The patient has agreed to participate and understands they can discontinue the visit at any time  Patient is aware this is a billable service  Subjective  Nataliia Arroyo is a 27 y o  female presenting for follow up   Rebeca Sacks shared that she has been doing quite well over the summer, spending a lot of time with her mom in [place] and visiting with her siblings that live in that area  She also has been working on summer classes and studying for her [employer] exam   She noted that she is feeling a little anxiety about the exam as she just wants it to be done, 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 then will be completely finished with grad school in December  She noted that at that point in time, she will likely move back down to [place] and look for a job there so she can be by family  Shahzad Canada said that with little other pressure over the summer other than studying for her exam and working on class work, she has been relaxing and really enjoying her time with family  She said that she feels "peaceful," and generally happy with where she is right now  She reports that her mood has been good, she is sleeping well, and does not have much else on her mind at this point  Provided support and positive feedback on Johanna's progress, encouraged her to focus on spending time with family before she has to go back to work, and to continue to utilize positive coping skills when needed  A: Shahzad Canada presented as "peaceful" today, with a bright affect and calm behavior  Her speech was normal volume, rate and fluency; eye contact good  Concentration was intact, thought process logical and organized  Insight and judgment intact  Denies SI HI and psychosis  Good progress toward goals  P: Shahzad Canada will continue to focus on positive progress and goals, will utilize anxiety reduction skills as needed, and will return for follow up in three weeks as scheduled        HPI     Past Medical History:   Diagnosis Date    Acid reflux     Schizoaffective disorder [facility])        Past Surgical History:   Procedure Laterality Date    CHOLECYSTECTOMY LAPAROSCOPIC N/A 11/17/2020    Procedure: Laparoscopic cholecystectomy;  Surgeon: Edward Pickard MD;  [facility];  Service: General       Current Outpatient Medications Medication Sig Dispense Refill    acetaminophen (TYLENOL) 325 mg tablet Take 2 tablets (650 mg total) by mouth every 6 (six) hours as needed (mild pain) (Patient not taking: Reported on 10/20/2021)      amLODIPine (NORVASC) 5 mg tablet       ARIPiprazole (ABILIFY) 10 mg tablet Take 1/2 tab (5mg) PO daily 30 tablet 2    benztropine (COGENTIN) 0 5 mg tablet Take 1 tablet (0 5 mg total) by mouth in the morning and 1 tablet (0 5 mg total) in the evening  60 tablet 2    FeroSul 325 (65 Fe) MG tablet Take 1 tablet by mouth daily with breakfast      ferrous sulfate 325 (65 Fe) mg tablet Take 325 mg by mouth 2 (two) times a day      hydrochlorothiazide (HYDRODIURIL) 12 5 mg tablet       ibuprofen (MOTRIN) 200 mg tablet Take 2 tablets (400 mg total) by mouth every 6 (six) hours as needed for mild pain or headaches (Take with food ) 30 tablet 0    loratadine (CLARITIN) 10 mg tablet       Multiple Vitamin (multivitamin) tablet Take 1 tablet by mouth      norgestimate-ethinyl estradiol (ORTHO TRI-CYCLEN LO) 0 18/0 215/0 25 MG-25 MCG per tablet Take 1 tablet by mouth daily      norgestimate-ethinyl estradiol (ORTHO TRI-CYCLEN LO) 0 18/0 215/0 25 MG-25 MCG per tablet Take 1 tablet by mouth daily      omeprazole (PriLOSEC) 40 MG capsule        No current facility-administered medications for this visit  Allergies   Allergen Reactions    Other      Seasonal allergy         Review of Systems    Video Exam    There were no vitals filed for this visit  Physical Exam     I spent 18 minutes directly with the patient during this visit    VIRTUAL VISIT DISCLAIMER    Peter Marilyn verbally agrees to participate in [employer]   Pt is aware that [employer] could be limited without vital signs or the ability to perform a full hands-on physical Amadou Delarosa understands she or the provider may request at any time to terminate the video visit and request the patient to seek care or treatment in person

## 2022-08-08 ENCOUNTER — TELEMEDICINE (OUTPATIENT)
Dept: BEHAVIORAL/MENTAL HEALTH CLINIC | Facility: CLINIC | Age: 27
End: 2022-08-08
Payer: COMMERCIAL

## 2022-08-08 DIAGNOSIS — F25.0 SCHIZOAFFECTIVE DISORDER, BIPOLAR TYPE (HCC): Primary | ICD-10-CM

## 2022-08-08 DIAGNOSIS — F43.20 ADJUSTMENT DISORDER, UNSPECIFIED TYPE: ICD-10-CM

## 2022-08-08 PROCEDURE — 90832 PSYTX W PT 30 MINUTES: CPT | Performed by: PSYCHIATRY & NEUROLOGY

## 2022-08-08 NOTE — PSYCH
Session time [phone] (total time 32 minutes)    Virtual Regular Visit    Verification of patient location:    Patient is located in the following state in which I hold an active license [place]      Assessment/Plan:    Problem List Items Addressed This Visit        Other    Adjustment disorder    Schizoaffective disorder, bipolar type ([facility] [address] ) - Primary          Goals addressed in session: Goal 1          Reason for visit is   Chief Complaint   Patient presents with    Virtual Regular Visit        Encounter provider OFE Perez    Provider located at [address] [place] [id number] [phone]      Recent Visits  No visits were found meeting these conditions  Showing recent visits within past 7 days and meeting all other requirements  Today's Visits  Date Type Provider Dept   08/08/22 Telemedicine [facility] [place]   Showing today's visits and meeting all other requirements  Future Appointments  No visits were found meeting these conditions  Showing future appointments within next 150 days and meeting all other requirements       The patient was identified by name and date of birth  Hermes Sanders was informed that this is a telemedicine visit and that the visit is being conducted throughic Embedded and patient was informed this is a secure, HIPAA-complaint platform  She agrees to proceed     My office door was closed  No one else was in the room  She acknowledged consent and understanding of privacy and security of the video platform  The patient has agreed to participate and understands they can discontinue the visit at any time  Patient is aware this is a billable service  Subjective  Hermes Sanders is a 27 y o  female presenting for follow up  Lorie Senters shared that she is still at her mom's, enjoying the free time with her family and resting    She said that she is still 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 in the 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, and while she has been a little anxious about it because she needs to pass it, she is studying regularly  She asked about study tips, so discussed ways that she could try to study that will work best for her  She said that she was having some anxiety about the upcoming semester, 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, 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 and figure out the rest of her life  She noted that she talked to her mom, who helped her realize that this is a temporary situation and that whatever comes up, it will pass and she will get through it  Encouraged Kyle Dubin to continue to use positive self-talk, remind herself of past successes and her ability to cope  A: Kyle Dubin presented as mildly anxious, with a constricted affect in anxious range, good eye contact and somewhat restless behavior  Her concentration was mildly impaired, thought process logical and organized  Insight and judgment intact  Denies SI HI and psychosis  Continued progress toward goals  P: Kyle Dubin will work on efficient study habits for her PRAXIS exam, will use positive self-talk and anxiety reduction strategies as needed, and will follow up in 3 weeks as scheduled        HPI     Past Medical History:   Diagnosis Date    Acid reflux     Schizoaffective disorder ([facility] [address] )        Past Surgical History:   Procedure Laterality Date    CHOLECYSTECTOMY LAPAROSCOPIC N/A 11/17/2020    Procedure: Laparoscopic cholecystectomy;  Surgeon: Gina Marie MD;  [facility];  Service: General       Current Outpatient Medications   Medication Sig Dispense Refill    acetaminophen (TYLENOL) 325 mg tablet Take 2 tablets (650 mg total) by mouth every 6 (six) hours as needed (mild pain) (Patient not taking: Reported on 10/20/2021)      amLODIPine (NORVASC) 5 mg tablet       ARIPiprazole (ABILIFY) 10 mg tablet Take 1/2 tab (5mg) PO daily 30 tablet 2    benztropine (COGENTIN) 0 5 mg tablet Take 1 tablet (0 5 mg total) by mouth in the morning and 1 tablet (0 5 mg total) in the evening  60 tablet 2    FeroSul 325 (65 Fe) MG tablet Take 1 tablet by mouth daily with breakfast      ferrous sulfate 325 (65 Fe) mg tablet Take 325 mg by mouth 2 (two) times a day      hydrochlorothiazide (HYDRODIURIL) 12 5 mg tablet       ibuprofen (MOTRIN) 200 mg tablet Take 2 tablets (400 mg total) by mouth every 6 (six) hours as needed for mild pain or headaches (Take with food ) 30 tablet 0    loratadine (CLARITIN) 10 mg tablet       Multiple Vitamin (multivitamin) tablet Take 1 tablet by mouth      norgestimate-ethinyl estradiol (ORTHO TRI-CYCLEN LO) 0 18/0 215/0 25 MG-25 MCG per tablet Take 1 tablet by mouth daily      omeprazole (PriLOSEC) 40 MG capsule        No current facility-administered medications for this visit  Allergies   Allergen Reactions    Other      Seasonal allergy         Review of Systems    Video Exam    There were no vitals filed for this visit  Physical Exam     I spent 32 minutes directly with the patient during this visit    VIRTUAL VISIT DISCLAIMER    Anusha Herbert verbally agrees to participate in [employer]  Pt is aware that [employer] could be limited without vital signs or the ability to perform a full hands-on physical Yandel Godfrey understands she or the provider may request at any time to terminate the video visit and request the patient to seek care or treatment in person

## 2022-08-10 ENCOUNTER — TELEMEDICINE (OUTPATIENT)
Dept: PSYCHIATRY | Facility: CLINIC | Age: 27
End: 2022-08-10

## 2022-08-10 DIAGNOSIS — M62.838 MUSCLE SPASM: ICD-10-CM

## 2022-08-10 DIAGNOSIS — F25.0 SCHIZOAFFECTIVE DISORDER, BIPOLAR TYPE (HCC): Primary | ICD-10-CM

## 2022-08-10 PROCEDURE — NC001 PR NO CHARGE: Performed by: PSYCHIATRY & NEUROLOGY

## 2022-08-10 RX ORDER — BENZTROPINE MESYLATE 0.5 MG/1
0.5 TABLET ORAL 2 TIMES DAILY
Qty: 60 TABLET | Refills: 2 | Status: SHIPPED | OUTPATIENT
Start: 2022-08-10

## 2022-08-10 RX ORDER — ARIPIPRAZOLE 10 MG/1
TABLET ORAL
Qty: 30 TABLET | Refills: 2 | Status: SHIPPED | OUTPATIENT
Start: 2022-08-10

## 2022-08-10 NOTE — PSYCH
Psychiatric Medication Management - [address] [facility] 27 y o  female MRN: [id number]    Virtual Regular Visit    [place] of [place] location[place]    Patient is located in the following state in which I hold an active license: [place]    Reason for visit is: Medication Management    Encounter provider Carmen Beaver MD    Provider located at [address] [address] [phone]      Recent Visits  No visits were found meeting these conditions  Showing recent visits within past 7 days and meeting all other requirements  Future Appointments  No visits were found meeting these conditions  Showing future appointments within next 150 days and meeting all other requirements       The patient was identified by name and date of birth  Marcos Warner was informed that this is a telemedicine visit and that the visit is being conducted through [address] [facility] and patient was informed that this is a secure, HIPAA-compliant platform  She agrees to proceed    My office door was closed  No one else was in the room  She acknowledged consent and understanding of privacy and security of the video platform  The patient has agreed to participate and understands they can discontinue the visit at any time  Patient is aware this is a billable service  Video Exam    There were no vitals filed for this visit  HPI:  Patient reports compliance with their medications and denies any current side effects  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 admits it's been a nice rejuvenation  Pt is looking forward to being done with her Master's degree in December  She is considering moving on to a different school, since she is not earning a lot there  She continues to enjoy reading, listening to music and eating   She reports good sleep, energy, concentration and appetite and denies any feelings of guilt or hopelessness  Pt denies any symptoms of jonnathan or psychosis and denies any suicidal ideations, intent or plan  Pt denies any suicidal ideations, intent or plan          Review Of Systems:     Constitutional Negative   ENT Negative   Cardiovascular Negative   Respiratory Negative   Gastrointestinal Negative   Genitourinary Negative   Musculoskeletal Negative   Integumentary Negative   Neurological Negative   Endocrine Negative     Past Medical History:   Patient Active Problem List   Diagnosis    Adjustment disorder    Schizoaffective disorder, bipolar type ([address] )    Benign essential hypertension    S/P laparoscopic cholecystectomy    Muscle spasm       Allergies: Allergies   Allergen Reactions    Other      Seasonal allergy         Past Surgical History:   Past Surgical History:   Procedure Laterality Date    CHOLECYSTECTOMY LAPAROSCOPIC N/A 11/17/2020    Procedure: Laparoscopic cholecystectomy;  Surgeon: Prosper Khan MD;  [facility];  Service: General       Family Psychiatric History:      Maternal grandfather: herve     Past Psychiatric History:      Past Inpatient Psychiatric Treatment:   Once, in 2011 at [facility] for paranoid and bizarre behavior (1 5 weeks)  Past Outpatient Psychiatric Treatment:    - [address] [place] [address] in 2011 with Dr Faith Renteria (psychiatrist) and Willian Stuart (therapists)  - [facility]  with Demetrius Trejo NP and Lety Ruelas previously, currently therapy with Lisa Rosales  Past Suicide Attempts: no  Past Violent Behavior: no  Past Psychiatric Medication Trials: Risperdal (only took for 1-2 weeks, did not like how she felt on it), cogentin, Abilify           Substance Abuse History:     No history of ETOH, illict substance, or tobacco abuse  No past legal actions or arrests secondary to substance intoxication  The patient denies prior DWIs/DUIs   No history of outpatient/inpatient rehabilitation programs  Geri does not exhibit objective evidence of substance withdrawal during today's examination nor does Geri appear under the influence of any psychoactive substance           Social History:  Born in [place], migrated to [address] at age 7 or 6    Developmental: Denies a history of milestone/developmental delay  Denies a history of in-utero exposure to toxins/illicit substances  There is no documented history of IEP or need for special education  Parents: mother alive, lives in [place] (moving to [facility] in a few weeks  Father  at age 6/10  Siblings: 3 brothers (25, 32, 27)  [address] with Masters degree in Education in the fall  Marital history: single  Living arrangement, social support: Lives alone  Good social support from mother or elder brother  Occupational History:  at [employer] school  Access to firearms: Denies direct access to weapons/firearms  Geri Hendricks has no history of arrests or violence with a deadly weapon  Hx of  service: denies  Prior incarcerations or legal issues: denies  Mormonism affiliation: Leann Bailon (practicing)     Traumatic History:      Abuse:none is reported  Other Traumatic Events: denies  Pt's father  from an unknown disease in [place] when she was 8, but she states it was not traumatic to her  The following portions of the patient's history were reviewed and updated as appropriate: allergies, current medications, past family history, past medical history, past social history, past surgical history and problem list     Objective: There were no vitals filed for this visit        Weight (last 2 days)     None          Mental status:  Appearance sitting comfortably in chair, dressed in casual clothing, adequate hygiene and grooming, cooperative with interview, good eye contact   Mood "good"   Affect Appears generally euthymic, stable, mood-congruent   Speech Normal rate, rhythm, and volume   Thought Processes Linear and goal directed   Associations intact associations   Hallucinations Denies any auditory or visual hallucinations   Thought Content No passive or active suicidal or homicidal ideation, intent, or plan  Orientation Oriented to person, place, time, and situation   Recent and Remote Memory Grossly intact   Attention Span and Concentration Concentration intact   Intellect Appears to be of Average Intelligence   Insight Insight intact   Judgement judgment was intact   Muscle Strength Muscle strength and tone were normal   Language Within normal limits   Fund of Knowledge Age appropriate   Pain None         Assessment/Plan:   Pt is 28 y/o F, PPH of Schizoaffective disorder, bipolar type, 1 past [facility] admission in 2011 for a manic and psychotic episode, no past suicide attempts, stable on Abilify 5 mg with no recent episodes of psychosis or hypomania/jonnathan since 2011  Pt  Is high functioning; works as a  and is a few months away from earning her Masters degree  She denies any current symptoms of jonnathan, psychosis or suicidal ideations, intent or plan  She has good social support from her mother and elder brother         Diagnoses and all orders for this visit:    Schizoaffective disorder, bipolar type ([facility] [address] )          Diagnosis:      Treatment Recommendations:    · Continue Abilify 5 mg for mood stabilization  · Continue Cogentin 0 5 mg po b i d for EPS symptoms  · Continue psychotherapy with Rut Dickson at [facility]  · Medical- F/u with primary care provider for on-going medical care  · Follow-up with this provider in 3 months  Risks, Benefits And Possible Side Effects Of Medications:  Risks, benefits, and possible side effects of medications explained to patient and family, they verbalize understanding    Controlled Medication Discussion: No records found for controlled prescriptions according to [address]       Psychotherapy Provided: Supportive psychotherapy provided       Yes  Counseling was provided during the session today for 16 minutes  I spent 30 minutes directly with the patient during this visit    VIRTUAL VISIT DISCLAIMER      Janiriaz Brandy verbally agrees to participate in [employer]  Pt is aware that [employer] could be limited without vital signs or the ability to perform a full hands-on physical exam @ understands she or the provider may request at any time to terminate the video visit and request the patient to seek care or treatment in person

## 2022-09-01 ENCOUNTER — TELEMEDICINE (OUTPATIENT)
Dept: BEHAVIORAL/MENTAL HEALTH CLINIC | Facility: CLINIC | Age: 27
End: 2022-09-01
Payer: COMMERCIAL

## 2022-09-01 DIAGNOSIS — F43.20 ADJUSTMENT DISORDER, UNSPECIFIED TYPE: Primary | ICD-10-CM

## 2022-09-01 DIAGNOSIS — F25.0 SCHIZOAFFECTIVE DISORDER, BIPOLAR TYPE (HCC): ICD-10-CM

## 2022-09-01 PROCEDURE — 90832 PSYTX W PT 30 MINUTES: CPT | Performed by: PSYCHIATRY & NEUROLOGY

## 2022-09-01 NOTE — PSYCH
Session time 21  (total time 21 minutes)    Virtual Regular Visit    Verification of patient location:    Patient is located in the following state in which I hold an active license [place]      Assessment/Plan:    Problem List Items Addressed This Visit        Other    Adjustment disorder - Primary    Schizoaffective disorder, bipolar type ([facility] [address] )          Goals addressed in session: Goal 1          Reason for visit is   Chief Complaint   Patient presents with    Virtual Regular Visit        Encounter provider OFE Baptiste    Provider located at [address] [place] [id number] [phone]      Recent Visits  No visits were found meeting these conditions  Showing recent visits within past 7 days and meeting all other requirements  Future Appointments  No visits were found meeting these conditions  Showing future appointments within next 150 days and meeting all other requirements       The patient was identified by name and date of birth  Domitila Arteaga was informed that this is a telemedicine visit and that the visit is being conducted throughCinnamon and patient was informed that this is a secure, HIPAA-compliant platform  She agrees to proceed     My office door was closed  No one else was in the room  She acknowledged consent and understanding of privacy and security of the video platform  The patient has agreed to participate and understands they can discontinue the visit at any time  Patient is aware this is a billable service  Subjective  Domitila Arteaga is a 32 y o  female presenting for follow up  Destinee Soto shared that 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  So far she said that it is going well, having to write lesson plans for each lesson, which to her feels like a lot of work but she admits that it is helpful    She said that having a supervising teacher watch her is a little anxiety-provoking, but she notes that she is getting used to it  She said that her fellow coworkers seem upbeat and hopeful that the year will be good, which has helped her feel positive and hopeful as well  She shared that she needed the time with her mom over the summer, and now feels rested and ready to take on her school year  She said "I'm in a good place right now "  Discussed Johanna's positive progress, and talked about termination  Raquel England said that she thinks that her insurance requires her to be in therapy to be able to get her medications, so she is unsure if she can terminate therapy, but said that she would consider her progress and how well she is coping on her own  A: Raquel England presented as calm and generally euthymic, with a bright affect, good eye contact and calm behavior  Her concentration was mildly impaired, thought process logical and organized  Speech normal volume, rate and fluency  Insight and judgment intact  Risk to self or others low as of today's assessment  Good progress toward goals  P: Raquel England 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 and keeping up with preparations for each lesson to reduce stress, will remind herself of positive progress and her success at coping on her own, and will return in one month for follow up        HPI     Past Medical History:   Diagnosis Date    Acid reflux     Schizoaffective disorder ([facility] [address] )        Past Surgical History:   Procedure Laterality Date    CHOLECYSTECTOMY LAPAROSCOPIC N/A 11/17/2020    Procedure: Laparoscopic cholecystectomy;  Surgeon: David Mei MD;  [facility];  Service: General       Current Outpatient Medications   Medication Sig Dispense Refill    acetaminophen (TYLENOL) 325 mg tablet Take 2 tablets (650 mg total) by mouth every 6 (six) hours as needed (mild pain) (Patient not taking: Reported on 10/20/2021)      amLODIPine (NORVASC) 5 mg tablet       ARIPiprazole (ABILIFY) 10 mg tablet Take 1/2 tab (5mg) PO daily 30 tablet 2    benztropine (COGENTIN) 0 5 mg tablet Take 1 tablet (0 5 mg total) by mouth 2 (two) times a day 60 tablet 2    FeroSul 325 (65 Fe) MG tablet Take 1 tablet by mouth daily with breakfast      ferrous sulfate 325 (65 Fe) mg tablet Take 325 mg by mouth 2 (two) times a day      hydrochlorothiazide (HYDRODIURIL) 12 5 mg tablet       ibuprofen (MOTRIN) 200 mg tablet Take 2 tablets (400 mg total) by mouth every 6 (six) hours as needed for mild pain or headaches (Take with food ) 30 tablet 0    loratadine (CLARITIN) 10 mg tablet       Multiple Vitamin (multivitamin) tablet Take 1 tablet by mouth      norgestimate-ethinyl estradiol (ORTHO TRI-CYCLEN LO) 0 18/0 215/0 25 MG-25 MCG per tablet Take 1 tablet by mouth daily      omeprazole (PriLOSEC) 40 MG capsule        No current facility-administered medications for this visit  Allergies   Allergen Reactions    Other      Seasonal allergy         Review of Systems    Video Exam    There were no vitals filed for this visit      Physical Exam     I spent 21 minutes directly with the patient during this visit

## 2022-10-03 ENCOUNTER — TELEMEDICINE (OUTPATIENT)
Dept: BEHAVIORAL/MENTAL HEALTH CLINIC | Facility: CLINIC | Age: 27
End: 2022-10-03
Payer: COMMERCIAL

## 2022-10-03 DIAGNOSIS — F25.0 SCHIZOAFFECTIVE DISORDER, BIPOLAR TYPE (HCC): Primary | ICD-10-CM

## 2022-10-03 PROCEDURE — 90832 PSYTX W PT 30 MINUTES: CPT | Performed by: PSYCHIATRY & NEUROLOGY

## 2022-10-03 NOTE — PSYCH
Session time 2433-1292 (total time 32 minutes)    Virtual Regular Visit    Verification of patient location:    Patient is located in the following state in which I hold an active license [place]      Assessment/Plan:    Problem List Items Addressed This Visit        Other    Schizoaffective disorder, bipolar type ([facility] [address] ) - Primary          Goals addressed in session: Goal 1          Reason for visit is   Chief Complaint   Patient presents with    Virtual Regular Visit        Encounter provider OFE Santana    Provider located at [address] [place] [id number] [phone]      Recent Visits  No visits were found meeting these conditions  Showing recent visits within past 7 days and meeting all other requirements  Future Appointments  No visits were found meeting these conditions  Showing future appointments within next 150 days and meeting all other requirements       The patient was identified by name and date of birth  Rowena Ceballos was informed that this is a telemedicine visit and that the visit is being conducted throughEpic Embedded and patient was informed this is a secure, HIPAA-complaint platform  She agrees to proceed     My office door was closed  No one else was in the room  She acknowledged consent and understanding of privacy and security of the video platform  The patient has agreed to participate and understands they can discontinue the visit at any time  Patient is aware this is a billable service  Subjective  Rowena Ceballos is a 32 y o  female presenting for follow up  Clinton Lozano shared that she has been doing well, 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  She said that she will be done with her master's degree in December, and is now contemplating continuing on for her PhD or [place]    She has not decided definitely yet, but is looking into schools that provide a fully funded doctoral program   She said that she thinks she wants to go into a program now before she finds a partner, gets  and starts a family, so that she will not have that "added burden to my family "  She wants to know what the program would entail before applying, though, so discussed methods to try to gather that information  Jayro Ireland said that she has been "happy" lately, making some friends through work and [employer], but also noted that she has been more anxious about her observations and thinking about doctoral program  She would like to make that decision by mid-October so she has some work to do right now, but said that she is confident that she can get it done  Provided support and validation of Johanna's progress and determination for her future goals, and used client-centered, motivational interviewing and CBT strategies to help Johanna reduce anxiety and move forward with decision making process  A: Jayro Ireland presented as "tired" but happy, with a bright affect, frequent yawning, good eye contact and mainly calm behavior  Her concentration was intact, thought process logical and organized  Insight and judgment intact  Denies SI HI and psychosis  Good progress toward goals  Annamaria Elmore will investigate what a doctoral program would entail to help her decide if that is the track she wants to pursue, will use anxiety reduction strategies as needed, and will return in one month for follow up as scheduled        HPI     Past Medical History:   Diagnosis Date    Acid reflux     Schizoaffective disorder [facility])        Past Surgical History:   Procedure Laterality Date    CHOLECYSTECTOMY LAPAROSCOPIC N/A 11/17/2020    Procedure: Laparoscopic cholecystectomy;  Surgeon: Prosper Khan MD;  [facility];  Service: General       Current Outpatient Medications   Medication Sig Dispense Refill    acetaminophen (TYLENOL) 325 mg tablet Take 2 tablets (650 mg total) by mouth every 6 (six) hours as needed (mild pain) (Patient not taking: Reported on 10/20/2021)      amLODIPine (NORVASC) 5 mg tablet       ARIPiprazole (ABILIFY) 10 mg tablet Take 1/2 tab (5mg) PO daily 30 tablet 2    benztropine (COGENTIN) 0 5 mg tablet Take 1 tablet (0 5 mg total) by mouth 2 (two) times a day 60 tablet 2    FeroSul 325 (65 Fe) MG tablet Take 1 tablet by mouth daily with breakfast      ferrous sulfate 325 (65 Fe) mg tablet Take 325 mg by mouth 2 (two) times a day      hydrochlorothiazide (HYDRODIURIL) 12 5 mg tablet       ibuprofen (MOTRIN) 200 mg tablet Take 2 tablets (400 mg total) by mouth every 6 (six) hours as needed for mild pain or headaches (Take with food ) 30 tablet 0    loratadine (CLARITIN) 10 mg tablet       Multiple Vitamin (multivitamin) tablet Take 1 tablet by mouth      norgestimate-ethinyl estradiol (ORTHO TRI-CYCLEN LO) 0 18/0 215/0 25 MG-25 MCG per tablet Take 1 tablet by mouth daily      omeprazole (PriLOSEC) 40 MG capsule        No current facility-administered medications for this visit  Allergies   Allergen Reactions    Other      Seasonal allergy         Review of Systems    Video Exam    There were no vitals filed for this visit      Physical Exam     I spent 32 minutes directly with the patient during this visit

## 2022-11-14 ENCOUNTER — TELEMEDICINE (OUTPATIENT)
Dept: PSYCHIATRY | Facility: CLINIC | Age: 27
End: 2022-11-14

## 2022-11-14 DIAGNOSIS — M62.838 MUSCLE SPASM: ICD-10-CM

## 2022-11-14 DIAGNOSIS — F25.0 SCHIZOAFFECTIVE DISORDER, BIPOLAR TYPE (HCC): Primary | ICD-10-CM

## 2022-11-14 RX ORDER — BENZTROPINE MESYLATE 0.5 MG/1
0.5 TABLET ORAL 2 TIMES DAILY
Qty: 60 TABLET | Refills: 2 | Status: SHIPPED | OUTPATIENT
Start: 2022-11-14

## 2022-11-14 RX ORDER — ARIPIPRAZOLE 10 MG/1
TABLET ORAL
Qty: 30 TABLET | Refills: 2 | Status: SHIPPED | OUTPATIENT
Start: 2022-11-14

## 2022-11-14 NOTE — PSYCH
Psychiatric Medication Management - [address] [facility] 32 y o  female MRN: [id number]    Virtual Regular Visit    [place] of [place] location[place]    Patient is located in the following state in which I hold an active license: [place]    Reason for visit is: Medication Management    Encounter provider Jer Butt MD    Provider located at [address] [address] [phone]      Recent Visits  No visits were found meeting these conditions  Showing recent visits within past 7 days and meeting all other requirements  Future Appointments  No visits were found meeting these conditions  Showing future appointments within next 150 days and meeting all other requirements       The patient was identified by name and date of birth  Linwood Nava was informed that this is a telemedicine visit and that the visit is being conducted through the [address] [employer] platform  She agrees to proceed    My office door was closed  No one else was in the room  She acknowledged consent and understanding of privacy and security of the video platform  The patient has agreed to participate and understands they can discontinue the visit at any time  Patient is aware this is a billable service  Video Exam    There were no vitals filed for this visit  HPI:  Patient reports compliance with her medications and denies any current side effects  She states things are going well at work and is looking forward to completing her Masters Degree next month  She continues to enjoy being a teacher and is looking forward to future career opportunities after earning her Masters Degree in December  She discusses a recent lock down drill she underwent at school, and 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 with all of the recent mass shootings etc  She is looking forward to the holidays and spending some quality time with her family    Pt reports good sleep, energy, concentration and appetite  She denies any feelings of guilt or hopelessness  Pt denies any suicidal ideations, intent or plan and denies any symptoms of jonnathan or psychosis  Review Of Systems:     Constitutional Negative   ENT Negative   Cardiovascular Negative   Respiratory Negative   Gastrointestinal Negative   Genitourinary Negative   Musculoskeletal Negative   Integumentary Negative   Neurological Negative   Endocrine Negative     Past Medical History:   Patient Active Problem List   Diagnosis   • Adjustment disorder   • Schizoaffective disorder, bipolar type ([facility] [address] )   • Benign essential hypertension   • S/P laparoscopic cholecystectomy   • Muscle spasm       Allergies: Allergies   Allergen Reactions   • Other      Seasonal allergy         Past Surgical History:   Past Surgical History:   Procedure Laterality Date   • CHOLECYSTECTOMY LAPAROSCOPIC N/A 11/17/2020    Procedure: Laparoscopic cholecystectomy;  Surgeon: Sivakumar Baron MD;  [facility];  Service: General     Family Psychiatric History:      Maternal grandfather: herve     Past Psychiatric History:      Past Inpatient Psychiatric Treatment:   Once, in 2011 at [facility] for paranoid and bizarre behavior (1 5 weeks)  Past Outpatient Psychiatric Treatment:    - [address] [place] [address] in 2011 with Dr Juan Pablo Hyatt (psychiatrist) and Edel Tamayo (therapists)  - [facility]  with Lluvia Marvin NP and Iron Saldivar previously, currently therapy with Venora Nickel  Past Suicide Attempts: no  Past Violent Behavior: no  Past Psychiatric Medication Trials: Risperdal (only took for 1-2 weeks, did not like how she felt on it), cogentin, Abilify           Substance Abuse History:     No history of ETOH, illict substance, or tobacco abuse  No past legal actions or arrests secondary to substance intoxication  The patient denies prior DWIs/DUIs   No history of outpatient/inpatient rehabilitation programs  Geri does not exhibit objective evidence of substance withdrawal during today's examination nor does Geri appear under the influence of any psychoactive substance           Social History:  Born in [facility] [facility], migrated to [address] at age 7 or 6    Developmental: Denies a history of milestone/developmental delay  Denies a history of in-utero exposure to toxins/illicit substances  There is no documented history of IEP or need for special education  Parents: mother alive, lives in [place] (moving to [place] in a few weeks  Father  at age 6/10  Siblings: 3 brothers (25, 32, 27)  [address] with Masters degree in Education in the fall  Marital history: single  Living arrangement, social support: Lives alone  Good social support from mother or elder brother  Occupational History:  at [employer] school  Access to firearms: Denies direct access to weapons/firearms  Geri Hendricks has no history of arrests or violence with a deadly weapon  Hx of  service: denies  Prior incarcerations or legal issues: denies  Amish affiliation: Gina Parker (practicing)     Traumatic History:      Abuse:none is reported  Other Traumatic Events: denies  Pt's father  from an unknown disease in [place] when she was 8, but she states it was not traumatic to her        The following portions of the patient's history were reviewed and updated as appropriate: allergies, current medications, past family history, past medical history, past social history, past surgical history and problem list     Objective: There were no vitals filed for this visit        Weight (last 2 days)     None          Mental status:  Appearance sitting comfortably in chair, dressed in casual clothing, adequate hygiene and grooming, cooperative with interview, good eye contact   Mood "good"   Affect Appears generally euthymic, stable, mood-congruent   Speech Normal rate, rhythm, and volume   Thought Processes Linear and goal directed   Associations intact associations   Hallucinations Denies any auditory or visual hallucinations   Thought Content No passive or active suicidal or homicidal ideation, intent, or plan  Orientation Oriented to person, place, time, and situation   Recent and Remote Memory Grossly intact   Attention Span and Concentration Concentration intact   Intellect Appears to be of Average Intelligence   Insight Insight intact   Judgement judgment was intact   Muscle Strength Muscle strength and tone were normal   Language Within normal limits   Fund of Knowledge Age appropriate   Pain None         Assessment/Plan:   Pt is 26 y/o F, PPH of Schizoaffective disorder, bipolar type, 1 past [facility] admission in 2011 for a manic and psychotic episode, no past suicide attempts, stable on Abilify 5 mg with no recent episodes of psychosis or hypomania/jonnathan since 2011  Pt  is high functioning; works as a  and is about to complete her Masters degree in Dec  She denies any current symptoms of jonnathan, psychosis or suicidal ideations, intent or plan  She has good social support from her mother and elder brother         Diagnoses and all orders for this visit:    Schizoaffective disorder, bipolar type ([facility] [address] )          Diagnosis:      Treatment Recommendations:    · Continue Abilify 5 mg for mood stabilization  · Continue Cogentin 0 5 mg po b i d for EPS symptoms  · Continue psych[facility] with Paul Pettit at [facility]  · Medical- F/u with primary care provider for on-going medical care  · Follow-up with this provider in 3 months  Risks, Benefits And Possible Side Effects Of Medications:  Risks, benefits, and possible side effects of medications explained to patient and family, they verbalize understanding    Controlled Medication Discussion: No records found for controlled prescriptions according to [address]       Psychotherapy Provided: Supportive psychotherapy provided  Yes  Counseling was provided during the session today for 16 minutes  Visit start time: 3:45  Visit end time: 4:15  I spent 30 minutes directly with the patient during this visit    VIRTUAL VISIT DISCLAIMER      Rowena Ceballos verbally agrees to participate in [employer]  Pt is aware that [employer] could be limited without vital signs or the ability to perform a full hands-on physical exam @ understands she or the provider may request at any time to terminate the video visit and request the patient to seek care or treatment in person

## 2022-12-08 ENCOUNTER — TELEMEDICINE (OUTPATIENT)
Dept: BEHAVIORAL/MENTAL HEALTH CLINIC | Facility: CLINIC | Age: 27
End: 2022-12-08

## 2022-12-08 DIAGNOSIS — F25.0 SCHIZOAFFECTIVE DISORDER, BIPOLAR TYPE (HCC): Primary | ICD-10-CM

## 2022-12-08 NOTE — BH TREATMENT PLAN
Mariya Smoker  1995       Date of Initial Treatment Plan: 4/16/2020    Date of Current Treatment Plan: 12/08/22    Treatment Plan Number 6    Strengths/Personal Resources for Self Care: "I am kind  I have empathy  I am compassionate  I am curious and a good listener "      Diagnosis:   1  Schizoaffective disorder, bipolar type ([facility] [address] )            Area of Needs: "I want to be reflective  I want to be aware of that I not only do things for them, but they do things for me "      Long Term Goal 1: A I want to continue to be more reflective and to better understand a situation before I respond to it  B I will consider the "proof" I have of the concerning thoughts I have (progressing well but not at goal as of 12/8/2022)    Target Date:  6/8/2023  Completion Date: n/a          Short Term Objectives for Goal 1: AI want to remain aware of my responsibilty to manage my personal opinions  B  I will challenge my troublesome thoughts with fact or other possible alternatives  C: I want to view my mistakes as learning experiences  D  I will maintain my awareness of the importance of self care  Long Term Goal 2: I would like to do better socially  (still working on goal as of 12/8/2022)    Target Date:  6/8/2023  Completion Date: n/a    Short Term Objectives for Goal 2:  A: I will give myself time to think before answering someone  B: I will work on being more present in a conversation instead of worrying about what I am going to say  C: I will try to focus on my thoughts instead of my emotions, in order to be able focus more on the conversation        GOAL 1: Modality: Individual 2x per month   Completion Date n/a and The person(s) responsible for carrying out the plan is  Geri     Goal 2: Modality: Individual 2x per month  Completion Date n/a      Behavioral Health Treatment Plan [facility]: Diagnosis and Treatment Plan explained to Natty Palacios relates understanding diagnosis and is agreeable to Treatment Plan  Yes    Client Comments : Please share your thoughts, feelings, need and/or experiences regarding your treatment plan: n/a    Shanelle Blue, 1995, actively participated in the review and update of this treatment plan during a virtual session, using the [employer]  Shanelle Blue  provided verbal consent on 12/8/2022 at 1628 PM  The treatment plan was transcribed into the [employer] Record at a later time

## 2022-12-08 NOTE — PSYCH
Virtual Regular Visit    Verification of patient location:    Patient is located in the following state in which I hold an active license [place]      Assessment/Plan:    Problem List Items Addressed This Visit        Other    Schizoaffective disorder, bipolar type ([facility] [address] ) - Primary       Goals addressed in session: Goal 1 and Goal 2          Reason for visit is   Chief Complaint   Patient presents with   • Virtual Regular Visit        Encounter provider OFE Hein    Provider located at [address] [place] [id number] [phone]      Recent Visits  No visits were found meeting these conditions  Showing recent visits within past 7 days and meeting all other requirements  Future Appointments  No visits were found meeting these conditions  Showing future appointments within next 150 days and meeting all other requirements       The patient was identified by name and date of birth  Ivory River was informed that this is a telemedicine visit and that the visit is being conducted throughthe [employer]  She agrees to proceed     My office door was closed  No one else was in the room  She acknowledged consent and understanding of privacy and security of the video platform  The patient has agreed to participate and understands they can discontinue the visit at any time  Patient is aware this is a billable service  Subjective  Ivory River is a 32 y o  female presenting for follow up  Leah Plummer shared that she has now finished her master's degree, and is done with school/student teaching  She will walk for graduation in May, but said that she is now trying to 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  She said that it is still so new that she still feels like she is missing doing something    Discussed the change in her responsibilities, the free time she will now have, and how to begin to try to adjust and find new things to do (foster new friendships and reconnect with old friends, spend time with family, engage in hobbies, etc)  She talked about not being good at [employer] friendships even though she values friends very much  She said that she wants to learn to be better at talking to people and finding commonalities that might help build relationships  Encouraged Raquel England to keep in mind that she has been very busy the last few years with school and work, and now with some more time to herself, she can try to focus some attention on relationships  Raquel England said that she is considering taking a  trip, but is not sure she wants to go by herself at this point in time  She also said that she would like to go now while she has no kids or other responsibilities, and wants to "prove" to herself that she can do it and push herself outside of her comfort zone  Discussed pros and cons of traveling now versus later, and importance of making a decision that feels right for her, not because others say she should do it  A: Raquel England presented as "exhausted," with a subdued, mood-congruent affect, good eye contact, and calm behavior  Her concentration was intact, thought process logical and organized  Insight and judgment intact  Denies SI HI and psychosis  Some mild progress toward goals  Lynda Leader will focus on self-care and rest while she adjusts to being done with school, will work on building relationships as she feels comfortable doing so, and will return in one month for follow up        HPI     Past Medical History:   Diagnosis Date   • Acid reflux    • Schizoaffective disorder [facility])        Past Surgical History:   Procedure Laterality Date   • CHOLECYSTECTOMY LAPAROSCOPIC N/A 11/17/2020    Procedure: Laparoscopic cholecystectomy;  Surgeon: David Mei MD;  Location: [facility] OR;  Service: General       Current Outpatient Medications   Medication Sig Dispense Refill   • acetaminophen (TYLENOL) 325 mg tablet Take 2 tablets (650 mg total) by mouth every 6 (six) hours as needed (mild pain) (Patient not taking: Reported on 10/20/2021)     • amLODIPine (NORVASC) 5 mg tablet      • ARIPiprazole (ABILIFY) 10 mg tablet Take 1/2 tab (5mg) PO daily 30 tablet 2   • benztropine (COGENTIN) 0 5 mg tablet Take 1 tablet (0 5 mg total) by mouth 2 (two) times a day 60 tablet 2   • FeroSul 325 (65 Fe) MG tablet Take 1 tablet by mouth daily with breakfast     • ferrous sulfate 325 (65 Fe) mg tablet Take 325 mg by mouth 2 (two) times a day     • hydrochlorothiazide (HYDRODIURIL) 12 5 mg tablet      • ibuprofen (MOTRIN) 200 mg tablet Take 2 tablets (400 mg total) by mouth every 6 (six) hours as needed for mild pain or headaches (Take with food ) 30 tablet 0   • loratadine (CLARITIN) 10 mg tablet      • Multiple Vitamin (multivitamin) tablet Take 1 tablet by mouth     • norgestimate-ethinyl estradiol (ORTHO TRI-CYCLEN LO) 0 18/0 215/0 25 MG-25 MCG per tablet Take 1 tablet by mouth daily     • omeprazole (PriLOSEC) 40 MG capsule        No current facility-administered medications for this visit  Allergies   Allergen Reactions   • Other      Seasonal allergy         Review of Systems    Video Exam    There were no vitals filed for this visit      Physical Exam     Visit Time    Visit Start Time: 8661  Visit Stop Time: 1640  Total Visit Duration: 34 minutes

## 2023-01-16 ENCOUNTER — TELEMEDICINE (OUTPATIENT)
Dept: BEHAVIORAL/MENTAL HEALTH CLINIC | Facility: CLINIC | Age: 28
End: 2023-01-16

## 2023-01-16 DIAGNOSIS — F25.0 SCHIZOAFFECTIVE DISORDER, BIPOLAR TYPE (HCC): Primary | ICD-10-CM

## 2023-01-16 DIAGNOSIS — F43.20 ADJUSTMENT DISORDER, UNSPECIFIED TYPE: ICD-10-CM

## 2023-01-16 NOTE — PSYCH
Virtual Regular Visit    Verification of patient location:    Patient is located in the following state in which I hold an active license [place]      Reason for visit is   Chief Complaint   Patient presents with   • Virtual Regular Visit        Encounter provider OFE Elliott    Provider located at [address] [place] [id number] [phone]      Recent Visits  No visits were found meeting these conditions  Showing recent visits within past 7 days and meeting all other requirements  Today's Visits  Date Type Provider Dept   01/16/23 Telemedicine Romana Kirsten, MSW [facility] Therapist [place]   Showing today's visits and meeting all other requirements  Future Appointments  No visits were found meeting these conditions  Showing future appointments within next 150 days and meeting all other requirements       The patient was identified by name and date of birth  Charito Ellsworth was informed that this is a telemedicine visit and that the visit is being conducted throughthe [employer]  She agrees to proceed     My office door was closed  No one else was in the room  She acknowledged consent and understanding of privacy and security of the video platform  The patient has agreed to participate and understands they can discontinue the visit at any time  Patient is aware this is a billable service  Behavioral Health Psychotherapy Progress Note    Psychotherapy Provided: Individual Psychotherapy     1  Schizoaffective disorder, bipolar type ([facility] [address] )        2  Adjustment disorder, unspecified type            Goals addressed in session: Goal 1 and Goal 2     DATA: Lalo Kapoor presented for follow up today, and said that she is doing "ok "  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, noting that she feels burned out and needs a break    She is currently looking for other jobs, and 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, and 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 to teaching  She said that financially she just 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, as she is not making ends meet and is borrowing money from family  Discussed job options, how she might go about looking into job opportunities  Trini Bautista said that her anxiety has been mild, only feeling anxious at the moment because she does not have another job yet  She said that sometimes she worries about what her future will look like, what kind of job will make her happy, etc, and she can lie awake at night thinking about those things, but overall she sleeps well and is able to redirect her thoughts from worry  Provided validation of Johanna's concerns, and provided support and encouragement of her pursuit of opportunities that will help her feel less overwhelmed and burned out and more satisfied  During this session, this clinician used the following therapeutic modalities: Client-centered Therapy, Cognitive Behavioral Therapy, Solution-Focused Therapy and Supportive Psychotherapy    Substance Abuse was not addressed during this session  If the client is diagnosed with a co-occurring substance use disorder, please indicate any changes in the frequency or amount of use: n/a  Stage of change for addressing substance use diagnoses: No substance use/Not applicable    ASSESSMENT:  Roel Mahmood presents with a Euthymic/ normal mood  her affect is Normal range and intensity, which is congruent, with her mood and the content of the session  The client has made progress on their goals  Roel Mahmood presents with a minimal risk of suicide, minimal risk of self-harm, and minimal risk of harm to others  For any risk assessment that surpasses a "low" rating, a safety plan must be developed      A safety plan was indicated: no  If yes, describe in detail n/a    PLAN: Between sessions, Roel Mahmood will work on finding a job that fits her needs, and will use anxiety reduction strategies as needed  At the next session, the therapist will use Client-centered Therapy, Cognitive Behavioral Therapy, Solution-Focused Therapy and Supportive Psychotherapy to address her job status, anxiety and socialization  Behavioral Health Treatment Plan and Discharge Planning: Jorge Wagner is aware of and agrees to continue to work on their treatment plan  They have identified and are working toward their discharge goals   yes    Visit start and stop times:    01/16/23  Start Time: 0656  Stop Time: 8652  Total Visit Time: 38 minutes

## 2023-02-16 ENCOUNTER — TELEMEDICINE (OUTPATIENT)
Dept: PSYCHIATRY | Facility: CLINIC | Age: 28
End: 2023-02-16

## 2023-02-16 ENCOUNTER — TELEPHONE (OUTPATIENT)
Dept: OTHER | Facility: OTHER | Age: 28
End: 2023-02-16

## 2023-02-16 DIAGNOSIS — M62.838 MUSCLE SPASM: ICD-10-CM

## 2023-02-16 DIAGNOSIS — F25.0 SCHIZOAFFECTIVE DISORDER, BIPOLAR TYPE (HCC): ICD-10-CM

## 2023-02-16 RX ORDER — BENZTROPINE MESYLATE 0.5 MG/1
0.5 TABLET ORAL 2 TIMES DAILY
Qty: 60 TABLET | Refills: 3 | Status: SHIPPED | OUTPATIENT
Start: 2023-02-16

## 2023-02-16 RX ORDER — ARIPIPRAZOLE 10 MG/1
TABLET ORAL
Qty: 30 TABLET | Refills: 3 | Status: SHIPPED | OUTPATIENT
Start: 2023-02-16

## 2023-02-16 NOTE — PSYCH
Psychiatric Medication Management - [address] [facility] 32 y o  female MRN: [id number]    Virtual Regular Visit    [place] of [place] location[place]    Patient is located in the following state in which I hold an active license: [place]    Reason for visit is: Medication Management    Encounter provider Curt Koyanagi, MD    Provider located at [address] [id number] [phone]      Recent Visits  No visits were found meeting these conditions  Showing recent visits within past 7 days and meeting all other requirements  Today's Visits  Date Type Provider Dept   02/16/23 [address] Florentin FOWLER today's visits and meeting all other requirements  Future Appointments  No visits were found meeting these conditions  Showing future appointments within next 150 days and meeting all other requirements       The patient was identified by name and date of birth  Tyrese Monique was informed that this is a telemedicine visit and that the visit is being conducted through the [employer]  She agrees to proceed    My office door was closed  No one else was in the room  She acknowledged consent and understanding of privacy and security of the video platform  The patient has agreed to participate and understands they can discontinue the visit at any time  Patient is aware this is a billable service  Video Exam    There were no vitals filed for this visit  HPI:  Patient reports compliance with their medications and denies any current side effects  Pt is still on the job search, and 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  Pt is leaving [place] soon since she is done with her schooling and has not found a new job, so it makes sense financially to move back home with her mom right now   In the mean time, 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 and is looking forward to graduation in May  Pt reports good sleep, energy, appetite, concentration and mood  Pt denies any feelings of hopelessness or guilt  Pt denies any auditory/visual hallucinations, paranoid ideations or delusions, symptoms of jonnathan or suicidal ideations, intent or plan  Review Of Systems:     Constitutional Negative   ENT Negative   Cardiovascular Negative   Respiratory Negative   Gastrointestinal Negative   Genitourinary Negative   Musculoskeletal Negative   Integumentary Negative   Neurological Negative   Endocrine Negative     Past Medical History:   Patient Active Problem List   Diagnosis   • Adjustment disorder   • Schizoaffective disorder, bipolar type ([facility] [address] )   • Benign essential hypertension   • S/P laparoscopic cholecystectomy   • Muscle spasm       Allergies: Allergies   Allergen Reactions   • Other      Seasonal allergy         Past Surgical History:   Past Surgical History:   Procedure Laterality Date   • CHOLECYSTECTOMY LAPAROSCOPIC N/A 11/17/2020    Procedure: Laparoscopic cholecystectomy;  Surgeon: Haylee Anderson MD;  Location: [facility] OR;  Service: General       Family Psychiatric History:      Maternal grandfather: vasquezremington     Past Psychiatric History:      Past Inpatient Psychiatric Treatment:   Once, in 2011 at [facility] for paranoid and bizarre behavior (1 5 weeks)  Past Outpatient Psychiatric Treatment:    - [address] [place] [address] in 2011 with Dr Muna Hogan (psychiatrist) and Pari Dahl (therapists)  - [facility]  with Christy Lui NP and Milton Scott previously, currently therapy with Arya Finders  Past Suicide Attempts: no  Past Violent Behavior: no  Past Psychiatric Medication Trials: Risperdal (only took for 1-2 weeks, did not like how she felt on it), cogentin, Abilify           Substance Abuse History:     No history of ETOH, illict substance, or tobacco abuse  No past legal actions or arrests secondary to substance intoxication   The patient denies prior DWIs/DUIs  No history of outpatient/inpatient rehabilitation programs  Geri does not exhibit objective evidence of substance withdrawal during today's examination nor does Geri appear under the influence of any psychoactive substance           Social History:  Born in [place], migrated to [address] at age 7 or 6    Developmental: Denies a history of milestone/developmental delay  Denies a history of in-utero exposure to toxins/illicit substances  There is no documented history of IEP or need for special education  Parents: mother alive, lives in [place] (moving to [facility] in a few weeks  Father  at age 6/10  Siblings: 3 brothers (25, 32, 27)  [address] with Masters degree in Education in the fall  Marital history: single  Living arrangement, social support: Lives alone  Good social support from mother or elder brother  Occupational History:  at [employer] school  Access to firearms: Denies direct access to weapons/firearms  Geri Hendricks has no history of arrests or violence with a deadly weapon  Hx of  service: denies  Prior incarcerations or legal issues: denies  Orthodox affiliation: Jhoan Mcdonald (practicing)     Traumatic History:      Abuse:none is reported  Other Traumatic Events: denies  Pt's father  from an unknown disease in [place] [place] when she was 8, but she states it was not traumatic to her        The following portions of the patient's history were reviewed and updated as appropriate: allergies, current medications, past family history, past medical history, past social history, past surgical history and problem list     Objective: There were no vitals filed for this visit        Weight (last 2 days)     None          Mental status:  Appearance sitting comfortably in chair, dressed in casual clothing, adequate hygiene and grooming, good eye contact   Mood "good"   Affect Appears generally euthymic, stable, mood-congruent Speech Normal rate, rhythm, and volume   Thought Processes Linear and goal directed   Associations intact associations   Hallucinations Denies any auditory or visual hallucinations   Thought Content No passive or active suicidal or homicidal ideation, intent, or plan  Orientation Oriented to person, place, time, and situation   Recent and Remote Memory Grossly intact   Attention Span and Concentration Concentration intact   Intellect Appears to be of Average Intelligence   Insight Insight intact   Judgement judgment was intact   Muscle Strength Muscle strength and tone were normal   Language Within normal limits   Fund of Knowledge Age appropriate   Pain None         Assessment/Plan:   Pt is 28 y/o F, PPH of Schizoaffective disorder, bipolar type, 1 past [facility] admission in 2011 for a manic and psychotic episode, no past suicide attempts, stable on Abilify 5 mg with no recent episodes of psychosis or hypomania/jonnathan since 2011  Pt  is high functioning; works as a  and just completed Masters degree in Dec  She denies any current symptoms of jonnathan, psychosis or suicidal ideations, intent or plan  She has good social support from her mother and elder brother         Diagnoses and all orders for this visit:     Schizoaffective disorder, bipolar type ([facility] [address] )               Treatment Recommendations:    • Continue Abilify 5 mg for mood stabilization  • Continue Cogentin 0 5 mg po b i d for EPS symptoms  • Continue psychotherapy with Arya Finders at [facility]  • Medical- F/u with primary care provider for on-going medical care  • Follow up with this provider in 3 months  Follow-up with this provider in 3 months  Risks, Benefits And Possible Side Effects Of Medications:  Risks, benefits, and possible side effects of medications explained to patient and family, they verbalize understanding    Controlled Medication Discussion: Discussed with patient Black Box warning on concurrent use of benzodiazepines and opioid medications including sedation, respiratory depression, coma and death  Patient understands the risk of treatment with benzodiazepines in addition to opioids and wants to continue taking those medications  Psychotherapy Provided: Supportive psychotherapy provided  Yes  Counseling was provided during the session today for 16 minutes  Visit start time: 1:30 pm  Visit end time: 2:00 pm  I spent 30 minutes directly with the patient during this visit    VIRTUAL VISIT DISCLAIMER      [address] verbally agrees to participate in [employer]  Pt is aware that [employer] could be limited without vital signs or the ability to perform a full hands-on physical exam @ understands she or the provider may request at any time to terminate the video visit and request the patient to seek care or treatment in person

## 2023-02-16 NOTE — TELEPHONE ENCOUNTER
Patient called saying that she missed her appointment today because she was locked out of her apartment because she had forgot her pass code and she had to wait for maintenance to let her back in her apartment, patient is asking if the office can give her back a call to reschedule her appointment

## 2023-02-17 ENCOUNTER — TELEPHONE (OUTPATIENT)
Dept: BEHAVIORAL/MENTAL HEALTH CLINIC | Facility: CLINIC | Age: 28
End: 2023-02-17

## 2023-02-17 NOTE — TELEPHONE ENCOUNTER
NO-SHOW LETTER MAILED TO Deandre Palomo    ADDRESS: [facility] [address] [address] [facility]  [place] [address] [place] [address]

## 2023-02-21 ENCOUNTER — TELEPHONE (OUTPATIENT)
Dept: PSYCHIATRY | Facility: CLINIC | Age: 28
End: 2023-02-21

## 2023-02-21 NOTE — TELEPHONE ENCOUNTER
Juanis Retana   requested a call back to discuss medication  She stated that is urgent  They can be reached at P# [phone]  Thank you  Call was transferred to nurse line

## 2023-02-21 NOTE — TELEPHONE ENCOUNTER
Maxime Rossi is requesting a call from Dr Johana Gabriel  She said she's having muscle spasms and asking if she can take 1 or 2 more of "the pills " She hasn't been sleeping

## 2023-02-22 ENCOUNTER — TELEPHONE (OUTPATIENT)
Dept: PSYCHIATRY | Facility: CLINIC | Age: 28
End: 2023-02-22

## 2023-02-22 NOTE — TELEPHONE ENCOUNTER
Pt reports that she discontinued her Cogentin about 2 weeks ago, and was agreeable to restart it due to some noted muscle spasms  Pt will follow up with any continued issues

## 2023-03-07 DIAGNOSIS — F25.0 SCHIZOAFFECTIVE DISORDER, BIPOLAR TYPE (HCC): ICD-10-CM

## 2023-03-07 DIAGNOSIS — M62.838 MUSCLE SPASM: ICD-10-CM

## 2023-03-07 NOTE — TELEPHONE ENCOUNTER
Medication Refill Request     Name of Medication Benztropine  Dose/Frequency per review  Quantity 60  Verified pharmacy   [x]  Verified ordering Provider   [x]  Does patient have enough for the next 3 days? Yes [] No [x]  Does patient have a follow-up appointment scheduled? Yes [x] No []   If so when is appointment: 5/9/2023      Pt called in and stated that she spoke with the provider and was instructed to take an entire tablet instead of 0 5 mg 2 times a day  Because of this she has run out early and its too early to refill the prescription

## 2023-03-08 RX ORDER — BENZTROPINE MESYLATE 0.5 MG/1
0.5 TABLET ORAL 2 TIMES DAILY
Qty: 60 TABLET | Refills: 3 | Status: SHIPPED | OUTPATIENT
Start: 2023-03-08 | End: 2023-03-13 | Stop reason: SDUPTHER

## 2023-03-11 ENCOUNTER — TELEPHONE (OUTPATIENT)
Dept: OTHER | Facility: OTHER | Age: 28
End: 2023-03-11

## 2023-03-11 NOTE — TELEPHONE ENCOUNTER
Patient of Guillermina Arana is requesting to speak with the on call provider  She has been taking benztropine (COGENTIN) 0 5 mg tablet and had permission to take more if necessary, but the prescription was not increased  Now she is out of medication and it is too early to refill      Paged to on call provider via TC

## 2023-03-11 NOTE — TELEPHONE ENCOUNTER
Returned patient's call  Patient stated that she had previously spoken to Dr Lloyd Renee about taking more Cogentin than the 0 5 mg BID that was prescribed  Stated that I was unable to find documentation from Dr Lloyd Renee stating that patient could take more than prescribed  Patient states that she has 1 pill remaining of cogentin and stated that she could not obtain a refill covered by insurance since it is early  Patient reports that she is able to obtain a refill for cogentin by paying out-of-pocket which she states she will do  Due to not finding documentation stating that patient can take more medication than prescribed, I recommended for patient to obtain the cogentin refill from the pharmacy so that she does not run out and recommended for her to take medication as prescribed and recommended for her to call Dr Lloyd Renee on Monday to discuss this further and patient verbalized understanding and was in agreement with the plan  Patient denies suicidal or homicidal ideation

## 2023-03-13 ENCOUNTER — TELEPHONE (OUTPATIENT)
Dept: PSYCHIATRY | Facility: CLINIC | Age: 28
End: 2023-03-13

## 2023-03-13 DIAGNOSIS — F25.0 SCHIZOAFFECTIVE DISORDER, BIPOLAR TYPE (HCC): ICD-10-CM

## 2023-03-13 DIAGNOSIS — M62.838 MUSCLE SPASM: ICD-10-CM

## 2023-03-13 RX ORDER — BENZTROPINE MESYLATE 0.5 MG/1
0.5 TABLET ORAL 2 TIMES DAILY
Qty: 60 TABLET | Refills: 3 | Status: SHIPPED | OUTPATIENT
Start: 2023-03-13

## 2023-03-13 NOTE — TELEPHONE ENCOUNTER
Spoke with patient regarding her Cogentin; plan remains the same, to continue with 0 5 mg bid  Pt denies any current EPS and states her symptoms are well controlled

## 2023-03-20 ENCOUNTER — TELEMEDICINE (OUTPATIENT)
Dept: BEHAVIORAL/MENTAL HEALTH CLINIC | Facility: CLINIC | Age: 28
End: 2023-03-20

## 2023-03-20 DIAGNOSIS — F25.0 SCHIZOAFFECTIVE DISORDER, BIPOLAR TYPE (HCC): Primary | ICD-10-CM

## 2023-03-20 DIAGNOSIS — F43.20 ADJUSTMENT DISORDER, UNSPECIFIED TYPE: ICD-10-CM

## 2023-03-20 NOTE — BH CRISIS PLAN
Client Name: Trevor Holden       Client YOB: 1995  : 1995    Treatment Team (include name and contact information):     Psychotherapist: Ralph Bhatt LCSW    Psychiatrist: Dr Tadeo Cabral MD   Release of information completed: yes    " n/a   Release of information completed: no    Other (Specify Role): n/a    Release of information completed: no    Other (Specify Role): n/a   Release of information completed: no    Healthcare Provider  MEHNAZ Mir  [address]  [facility] [address]     Type of Plan   * Child plans (children 15 yo and younger) must be completed and signed by the child's legal guardian   * Plans for all individuals 15 yo and above must be signed by the client  Plan Type: adolescent/adult (15 and over) Initial      My Personal Strengths are (in the client's own words):  Dedicated, peace, family-oriented    The stressors and triggers that may put me at risk are:  being physically tired, being hungry and other (describe) school/work stress    Coping skills I can use to keep myself calm and safe:  Listen to music, Call a friend or family member, [place]/meditate and Other (describe) try to reframe thoughts; think positively    Coping skills/supports I can use to maintain abstinence from substance use:   n/a    The people that provide me with help and support: (Include name, contact, and how they can help)   Support person #1: mom    * Phone number: [phone]    * How can they help me? She knows how to calm me down, she helps me think more realistically     Support person #2:youngest brother    * Phone number: [phone]    * How can they help me? Very philosophical and helps comfort me and think more realistically; more my age and "gets it"     Support person #3: older brother    * Phone number: [phone]    * How can they help me?  Mix of mom and younger brother    In the past, the following has helped me in times of crisis:    Taking medications, Talking to a professional on the telephone, Going into the hospital, Calling a family member, Praying or meditating and Listening to music      If it is an emergency and you need immediate help, call     If there is a possibility of danger to yourself or others, call the following crisis hotline resources:     Adult Crisis Numbers  Suicide Prevention Hotline - Dial   [facility]: [address]: [address]: [address]: [address] [place] [address] Avenue: [address] [place] [address] Street: [address] [place] [address] Avenue: [address] [place] [address] St: [phone] (daytime)  [phone] (after hours, weekends, holidays)     Child/Adolescent Crisis Numbers   [facility]: [address]: [phone]   [facility]: [phone]   [facility]: [phone]    Please note: Some [facility] do not have a separate number for Child/Adolescent specific crisis  If your county is not listed under Child/Adolescent, please call the adult number for your county     National Talk to Text Line   All Ages - [phone]    In the event your feelings become unmanageable, and you cannot reach your support system, you will call 911 immediately or go to the nearest hospital emergency room

## 2023-03-20 NOTE — PSYCH
Virtual Regular Visit    Verification of patient location:    Patient is located in the following state in which I hold an active license [place]      Assessment/Plan:    Problem List Items Addressed This Visit        Other    Adjustment disorder    Schizoaffective disorder, bipolar type ([facility] [address] ) - Primary       Goals addressed in session: Goal 1          Reason for visit is   Chief Complaint   Patient presents with   • Virtual Regular Visit        Encounter provider OFE Mccallum    Provider located at [address] [place] [id number] [phone]      Recent Visits  No visits were found meeting these conditions  Showing recent visits within past 7 days and meeting all other requirements  Today's Visits  Date Type Provider Dept   03/20/23 Telemedicine [facility] The[facility]ist [place]   Showing today's visits and meeting all other requirements  Future Appointments  No visits were found meeting these conditions  Showing future appointments within next 150 days and meeting all other requirements       The patient was identified by name and date of birth  Jb Blevins was informed that this is a telemedicine visit and that [facility]  She agrees to proceed     My office door was closed  No one else was in the room  She acknowledged consent and understanding of privacy and security of the video platform  The patient has agreed to participate and understands they can discontinue the visit at any time  Patient is aware this is a billable service  Behavioral Health Psychotherapy Progress Note    Psychotherapy Provided: Individual Psychotherapy     1  Schizoaffective disorder, bipolar type ([facility] [address] )        2   Adjustment disorder, unspecified type            Goals addressed in session: Goal 1     DATA: Edgar Raoul presented for follow up, 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 home with her mom in [place] in April  She said that she will be glad to be near her family again, especially her new niece  She said that she is realizing now how much she misses her family  She talked about trying to focus on her health right now,eating better food and getting enough rest   She is preparing to move next month, but said that she does not feel stressed about it  She expressed some frustration with trying to find a new job, having been rejected from multiple applications so far  Provided support and validation of Johanna's feelings/frustrations, and encouraged her to focus on goals and "why's"  During this session, this clinician used the following therapeutic modalities: Client-centered Therapy, Cognitive Behavioral Therapy and Supportive Psychotherapy    Substance Abuse was not addressed during this session  If the client is diagnosed with a co-occurring substance use disorder, please indicate any changes in the frequency or amount of use: n/a  Stage of change for addressing substance use diagnoses: No substance use/Not applicable    ASSESSMENT:  Chandler Partida presents with a Euthymic/ normal mood  her affect is Normal range and intensity, which is congruent, with her mood and the content of the session  The client has made progress on their goals  Chandler Partida presents with a low risk of suicide, low risk of self-harm, and low risk of harm to others  For any risk assessment that surpasses a "low" rating, a safety plan must be developed  A safety plan was indicated: no  If yes, describe in detail n/a    PLAN: Between sessions, Chandler Partida will continue to work on self-care and transitioning to new home with mom  At the next session, the therapist will use Client-centered Therapy, Cognitive Behavioral Therapy and Supportive Psychotherapy to address anxiety and depression      Behavioral Health Treatment Plan and Discharge Planning: Jesikadanilo John is aware of and agrees to continue to work on their treatment plan  They have identified and are working toward their discharge goals   yes    Visit start and stop times:    03/20/23  Start Time: 1603  Stop Time: 1633  Total Visit Time: 30 minutes

## 2023-05-09 ENCOUNTER — TELEMEDICINE (OUTPATIENT)
Dept: PSYCHIATRY | Facility: CLINIC | Age: 28
End: 2023-05-09

## 2023-05-09 DIAGNOSIS — M62.838 MUSCLE SPASM: ICD-10-CM

## 2023-05-09 DIAGNOSIS — F25.0 SCHIZOAFFECTIVE DISORDER, BIPOLAR TYPE (HCC): Primary | ICD-10-CM

## 2023-05-09 RX ORDER — ARIPIPRAZOLE 10 MG/1
10 TABLET ORAL DAILY
Qty: 90 TABLET | Refills: 1 | Status: SHIPPED | OUTPATIENT
Start: 2023-05-09 | End: 2023-08-07

## 2023-05-09 RX ORDER — BENZTROPINE MESYLATE 0.5 MG/1
0.5 TABLET ORAL 2 TIMES DAILY
Qty: 180 TABLET | Refills: 1 | Status: SHIPPED | OUTPATIENT
Start: 2023-05-09 | End: 2023-08-07

## 2023-05-09 NOTE — PSYCH
"Psychiatric Medication Management - [address] [facility] 32 y o  female MRN: [id number]     Virtual Regular Visit    Verification of [place] location: [place]    Patient is located in the following state in which I hold an active license: [place]    Reason for visit is: Medication Management    Encounter provider Vilma Moreno MD    Provider located at [address] [address] [phone]      Recent Visits  No visits were found meeting these conditions  Showing recent visits within past 7 days and meeting all other requirements  Future Appointments  No visits were found meeting these conditions  Showing future appointments within next 150 days and meeting all other requirements       The patient was identified by name and date of birth  Carlos Young was informed that this is a telemedicine visit and that the visit is being conducted through the [facility]  She agrees to proceed    My office door was closed  No one else was in the room  She acknowledged consent and understanding of privacy and security of the video platform  The patient has agreed to participate and understands they can discontinue the visit at any time  Patient is aware this is a billable service  Video Exam    There were no vitals filed for this visit  HPI:  Patient reports compliance with their medications and denies any current side effects  Pt is looking forward to her graduation in a few weeks, but admits to some \"exciting nervousness\" about the future  She is continuing her job search and is hopeful she will find a good match soon  She is looking forward to a possible family vacation over the summer  Pt reports good sleep, appetite, energy, concentration and mood  She denies any muscle spasms  Pt denies any suicidal ideations, intent or plan        Review Of Systems:     Constitutional Negative   ENT Negative " Cardiovascular Negative   Respiratory Negative   Gastrointestinal Negative   Genitourinary Negative   Musculoskeletal Negative   Integumentary Negative   Neurological Negative   Endocrine Negative     Past Medical History:   Patient Active Problem List   Diagnosis   • Adjustment disorder   • Schizoaffective disorder, bipolar type ([facility] [address] )   • Benign essential hypertension   • S/P laparoscopic cholecystectomy   • Muscle spasm       Allergies: Allergies   Allergen Reactions   • Other      Seasonal allergy         Past Surgical History:   Past Surgical History:   Procedure Laterality Date   • CHOLECYSTECTOMY LAPAROSCOPIC N/A 11/17/2020    Procedure: Laparoscopic cholecystectomy;  Surgeon: Denise Arango MD;  [facility];  Service: General       Family Psychiatric History:      Maternal grandfather: herve     Past Psychiatric History:      Past Inpatient Psychiatric Treatment:   Once, in 2011 at [facility] for paranoid and bizarre behavior (1 5 weeks)  Past Outpatient Psychiatric Treatment:    - [facility] [place] in 2011 with Dr Ning Nascimento (psychiatrist) and Tejas Bernal (therapists)  - [facility]  with Renetta Camacho NP and Pj Bishop previously, currently therapy with Supriya Fontana  Past Suicide Attempts: no  Past Violent Behavior: no  Past Psychiatric Medication Trials: Risperdal (only took for 1-2 weeks, did not like how she felt on it), cogentin, Abilify           Substance Abuse History:     No history of ETOH, illict substance, or tobacco abuse  No past legal actions or arrests secondary to substance intoxication  The patient denies prior DWIs/DUIs   No history of outpatient/inpatient rehabilitation programs  Geri does not exhibit objective evidence of substance withdrawal during today's examination nor does Geri appear under the influence of any psychoactive substance           Social History:  Born in [facility] [facility], migrated to [address] at age 7 or 6    Developmental: Denies a history of "milestone/developmental delay  Denies a history of in-utero exposure to toxins/illicit substances  There is no documented history of IEP or need for special education  Parents: mother alive, lives in [place] (moving to [place] in a few weeks  Father  at age 6/10  Siblings: 3 brothers (25, 32, 27)  [address] with Masters degree in Education in the fall  Marital history: single  Living arrangement, social support: Lives alone  Good social support from mother or elder brother  Occupational History:  at [employer] school  Access to firearms: Denies direct access to weapons/firearms  Geri Hendricks has no history of arrests or violence with a deadly weapon  Hx of  service: denies  Prior incarcerations or legal issues: denies  Rest[employer] affiliation: [employer] (practicing)     Traumatic History:      Abuse:none is reported  Other Traumatic Events: denies  Pt's father  from an [place] when she was 8, but she states it was not traumatic to her        The following portions of the patient's history were reviewed and updated as appropriate: allergies, current medications, past family history, past medical history, past social history, past surgical history and problem list     Objective: There were no vitals filed for this visit  Weight (last 2 days)     None          Mental status:  Appearance sitting comfortably in chair, dressed in casual clothing, adequate hygiene and grooming, cooperative with interview, good eye contact   Mood \"good\"   Affect Appears generally euthymic, stable, mood-congruent   Speech Normal rate, rhythm, and volume   Thought Processes Linear and goal directed   Associations intact associations   Hallucinations Denies any auditory or visual hallucinations   Thought Content No passive or active suicidal or homicidal ideation, intent, or plan     Orientation Oriented to person, place, time, and situation   Recent " and Remote Memory Grossly intact   Attention Span and Concentration Concentration intact   Intellect Appears to be of Average Intelligence   Insight Insight intact   Judgement judgment was intact   Muscle Strength Muscle strength and tone were normal   Language Within normal limits   Fund of Knowledge Age appropriate   Pain None         Assessment/Plan:      Pt is 28 y/o F, PPH of Schizoaffective disorder, bipolar type, 1 past [facility] admission in 2011 for a manic and psychotic episode, no past suicide attempts, stable on Abilify 5 mg with no recent episodes of psychosis or hypomania/jonnathan since 2011  Pt  is high functioning; works as a  and just completed Masters degree in  She denies any current symptoms of jonnathan, psychosis or suicidal ideations, intent or plan  She has good social support from her mother and elder brother         Diagnoses and all orders for this visit:     Schizoaffective disorder, bipolar type ([facility] [address] )               Treatment Recommendations:    • Continue Abilify 5 mg qd for mood stabilization  • Continue Cogentin 0 5 mg po b i d for EPS symptoms  • Continue psychotherapy with Lorena Reilly at [facility]  • Medical- F/u with primary care provider for on-going medical care  • Transfer of care for Follow up in 3 months  Risks, Benefits And Possible Side Effects Of Medications:  Risks, benefits, and possible side effects of medications explained to patient and family, they verbalize understanding    Controlled Medication Discussion: No records found for controlled prescriptions according to Frida Nina [address]       Psychotherapy Provided: Supportive psychotherapy provided  Yes  Counseling was provided during the session today for 16 minutes          Visit start time: 3:00 pm  Visit end time: 3:30 pm  I spent 30 minutes directly with the patient during this visit    VIRTUAL VISIT DISCLAIMER      [place] verbally agrees to participate in Virtual Care Services  Pt is aware that [employer] could be limited without vital signs or the ability to perform a full hands-on physical exam @ understands she or the provider may request at any time to terminate the video visit and request the patient to seek care or treatment in person

## 2023-05-18 ENCOUNTER — TELEPHONE (OUTPATIENT)
Dept: PSYCHIATRY | Facility: CLINIC | Age: 28
End: 2023-05-18

## 2023-05-18 NOTE — TELEPHONE ENCOUNTER
Patient called regarding receiving a no show letter and would like to be contacted by provider to reschedule appointment   For your review thank you

## 2023-05-19 ENCOUNTER — TELEPHONE (OUTPATIENT)
Dept: PSYCHIATRY | Facility: CLINIC | Age: 28
End: 2023-05-19

## 2023-05-19 NOTE — TELEPHONE ENCOUNTER
NO-SHOW LETTER MAILED TO Marlen Mosher    ADDRESS: [phone] [address]

## 2023-06-22 ENCOUNTER — TELEMEDICINE (OUTPATIENT)
Dept: BEHAVIORAL/MENTAL HEALTH CLINIC | Facility: CLINIC | Age: 28
End: 2023-06-22
Payer: COMMERCIAL

## 2023-06-22 DIAGNOSIS — F25.0 SCHIZOAFFECTIVE DISORDER, BIPOLAR TYPE (HCC): Primary | ICD-10-CM

## 2023-06-22 DIAGNOSIS — F43.20 ADJUSTMENT DISORDER, UNSPECIFIED TYPE: ICD-10-CM

## 2023-06-22 PROCEDURE — 90834 PSYTX W PT 45 MINUTES: CPT | Performed by: PSYCHIATRY & NEUROLOGY

## 2023-06-22 NOTE — PSYCH
Virtual Regular Visit    Verification of patient location:    Patient is located at Home in the following state in which I hold an active license [place]    Assessment/Plan:    Problem List Items Addressed This Visit        Other    Adjustment disorder    Schizoaffective disorder, bipolar type ([facility] [address] ) - Primary       Reason for visit is   Chief Complaint   Patient presents with   • Virtual Regular Visit        Encounter provider OFE Augustin    Provider located at [address] [place] [id number] [phone]      Recent Visits  No visits were found meeting these conditions  Showing recent visits within past 7 days and meeting all other requirements  Today's Visits  Date Type Provider Dept   06/22/23 Telemedicine [facility] Therapist [place]   Showing today's visits and meeting all other requirements  Future Appointments  No visits were found meeting these conditions  Showing future appointments within next 150 days and meeting all other requirements       The patient was identified by name and date of birth  Chet Zhou was informed that this is a telemedicine visit and that [facility]  She agrees to proceed     My office door was closed  No one else was in the room  She acknowledged consent and understanding of privacy and security of the video platform  The patient has agreed to participate and understands they can discontinue the visit at any time  Patient is aware this is a billable service       HPI     Past Medical History:   Diagnosis Date   • Acid reflux    • Schizoaffective disorder [facility])        Past Surgical History:   Procedure Laterality Date   • CHOLECYSTECTOMY LAPAROSCOPIC N/A 11/17/2020    Procedure: Laparoscopic cholecystectomy;  Surgeon: Treva Ramirez MD;  [facility];  Service: General Current Outpatient Medications   Medication Sig Dispense Refill   • acetaminophen (TYLENOL) 325 mg tablet Take 2 tablets (650 mg total) by mouth every 6 (six) hours as needed (mild pain) (Patient not taking: Reported on 10/20/2021)     • amLODIPine (NORVASC) 5 mg tablet      • ARIPiprazole (ABILIFY) 10 mg tablet Take 1 tablet (10 mg total) by mouth daily 90 tablet 1   • benztropine (COGENTIN) 0 5 mg tablet Take 1 tablet (0 5 mg total) by mouth 2 (two) times a day 180 tablet 1   • FeroSul 325 (65 Fe) MG tablet Take 1 tablet by mouth daily with breakfast     • hydrochlorothiazide (HYDRODIURIL) 12 5 mg tablet      • ibuprofen (MOTRIN) 200 mg tablet Take 2 tablets (400 mg total) by mouth every 6 (six) hours as needed for mild pain or headaches (Take with food ) 30 tablet 0   • loratadine (CLARITIN) 10 mg tablet      • Multiple Vitamin (multivitamin) tablet Take 1 tablet by mouth     • norgestimate-ethinyl estradiol (ORTHO TRI-CYCLEN LO) 0 18/0 215/0 25 MG-25 MCG per tablet Take 1 tablet by mouth daily     • omeprazole (PriLOSEC) 40 MG capsule        No current facility-administered medications for this visit  Allergies   Allergen Reactions   • Other      Seasonal allergy         Review of Systems    Video Exam    There were no vitals filed for this visit  Physical Exam     Behavioral Health Psychotherapy Progress Note    Psychotherapy Provided: Individual Psychotherapy     1  Schizoaffective disorder, bipolar type ([facility] [address] )        2  Adjustment disorder, unspecified type          Goals addressed in session: Goal 1     DATA: Renate Wong presented for follow up, sharing that she is doing well at home with mom, and is interviewing for a new job (has second interview coming up)  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    She noted that she is considering going back to school for her doctorate degree, possibly in psychology, as she is considering "being a therapist   She shared that her mood has been good, and is not particularly stressed at the moment, but is contemplating what her future will look like  She talked about not having friends and always wanting them, but also being very comfortable on her own without having to interact with other people  Discussed her goals and plans, provided support and validation of her concerns and hopes, and encouraged Johanna to continue to use positive affirmations/self-talk to build self-esteem and give her the confidence to be more engaged with the world  During this session, this clinician used the following therapeutic modalities: Client-centered Therapy, Cognitive Behavioral Therapy and Supportive Psychotherapy    Substance Abuse was not addressed during this session  If the client is diagnosed with a co-occurring substance use disorder, please indicate any changes in the frequency or amount of use: n/a  Stage of change for addressing substance use diagnoses: No substance use/Not applicable    ASSESSMENT:  Shashank Hernández presents with a Euthymic/ normal mood  her affect is Normal range and intensity, which is congruent, with her mood and the content of the session  The client has made progress on their goals  Shashank Hernández presents with a minimal risk of suicide, minimal risk of self-harm, and minimal risk of harm to others  For any risk assessment that surpasses a \"low\" rating, a safety plan must be developed  A safety plan was indicated: no  If yes, describe in detail n/a    PLAN: Between sessions, Shashank Hernández will reflect on future plans and job track, will continue to use positive self-talk to help her continue to build self-confidence, and will try to engage with people outside of her home  At the next session, the therapist will use Client-centered Therapy, Cognitive Behavioral Therapy and Supportive Psychotherapy to address anxiety      Behavioral Health Treatment Plan and Discharge " Planning: Sherry Torres is aware of and agrees to continue to work on their treatment plan  They have identified and are working toward their discharge goals   yes    Visit start and stop times:    06/22/23  Start Time: 1601  Stop Time: 1644  Total Visit Time: 43 minutes

## 2023-06-22 NOTE — BH TREATMENT PLAN
"  Outpatient Behavioral Health Psychotherapy Treatment Plan    [address]  1995     Date of Initial Psychotherapy Assessment: 4/16/2020   Date of Current Treatment Plan: 06/22/23  Treatment Plan Target Date: 12/22/2023  Treatment Plan Expiration Date: 12/22/2023    Diagnosis:   1  Schizoaffective disorder, bipolar type ([address] )        2  Adjustment disorder, unspecified type            Area(s) of Need: \"I want to be reflective  I want to be aware of that I not only do things for them, but they do things for me  \"    Long Term Goal 1 (in the client's own words): I want to continue to be more reflective and to better understand a situation before I respond to it  I will consider the \"proof\" I have of the concerning thoughts I have    Stage of Change: Action    Target Date for completion: 12/22/2023     Anticipated therapeutic modalities: CBT     People identified to complete this goal: Ki General      Objective 1: (identify the means of measuring success in meeting the objective): I want to remain aware of my responsibilty to manage my personal opinions  Objective 2: (identify the means of measuring success in meeting the objective): I will challenge my troublesome thoughts with fact or other possible alternatives       Objective 3: I want to view my mistakes as learning experiences       Objective 4: I will maintain my awareness of the importance of self care      Long Term Goal 2 (in the client's own words): I would like to do better socially  (still working on goal as of 6/22/2023)    Stage of Change: Action    Target Date for completion: 12/22/2023     Anticipated therapeutic modalities: CBT, communication skills training     People identified to complete this goal: Kip [facility]      Objective 1: (identify the means of measuring success in meeting the objective): I will give myself time to think before answering someone        Objective 2: (identify the means of measuring success in meeting " the objective): I will work on being more present in a conversation instead of worrying about what I am going to say       Objective 3: I will try to focus on my thoughts instead of my emotions, in order to be able focus more on the conversation  Long Term Goal 3 (in the client's own words): n/a    Stage of Change: Pre-contemplation    Target Date for completion: n/a     Anticipated therapeutic modalities: n/a     People identified to complete this goal: n/a      Objective 1: (identify the means of measuring success in meeting the objective): n/a      Objective 2: (identify the means of measuring success in meeting the objective): n/a     I am currently under the care of a [facility] psychiatric provider: yes    My [facility] psychiatric provider is: Dr Darrick Wilkins    I am currently taking psychiatric medications: Yes, as prescribed    I feel that I will be ready for discharge from mental health care when I reach the following (measurable goal/objective): when I am at a point where I feel like I can tackle life like I used to, with confidence and the strength I have gained, and when I can enjoy being around other people in social situations  For children and adults who have a legal guardian:   Has there been any change to custody orders and/or guardianship status? NA  If yes, attach updated documentation  I have created my Crisis Plan and have been offered a copy of this plan    [address]: Diagnosis and Treatment Plan explained to Khushboo Flores  Khushboo Pettit acknowledges an understanding of their diagnosis  Khushboo Pettit agrees to this treatment plan  I have been offered a copy of this Treatment Plan  yes      Khushboo Flores , 1995, actively participated in the review and update of this treatment plan during a virtual session, using the [employer]     Khushboo Pettit  provided verbal consent on 6/22/2023 at 1635 PM  The treatment plan was transcribed into the Electronic Health Record at a later time   Treatment plan also sento to Geri's MyChart for signature

## 2023-07-10 ENCOUNTER — TELEPHONE (OUTPATIENT)
Dept: PSYCHIATRY | Facility: CLINIC | Age: 28
End: 2023-07-10

## 2023-07-10 ENCOUNTER — TELEMEDICINE (OUTPATIENT)
Dept: PSYCHIATRY | Facility: CLINIC | Age: 28
End: 2023-07-10
Payer: COMMERCIAL

## 2023-07-10 DIAGNOSIS — F25.0 SCHIZOAFFECTIVE DISORDER, BIPOLAR TYPE (HCC): Primary | ICD-10-CM

## 2023-07-10 DIAGNOSIS — M62.838 MUSCLE SPASM: ICD-10-CM

## 2023-07-10 DIAGNOSIS — F41.9 ANXIETY DISORDER, UNSPECIFIED TYPE: ICD-10-CM

## 2023-07-10 PROCEDURE — 90792 PSYCH DIAG EVAL W/MED SRVCS: CPT | Performed by: PSYCHIATRY & NEUROLOGY

## 2023-07-10 RX ORDER — BENZTROPINE MESYLATE 0.5 MG/1
0.5 TABLET ORAL 2 TIMES DAILY
Qty: 60 TABLET | Refills: 2 | Status: SHIPPED | OUTPATIENT
Start: 2023-07-10 | End: 2023-10-08

## 2023-07-10 RX ORDER — ARIPIPRAZOLE 15 MG/1
15 TABLET ORAL DAILY
Qty: 30 TABLET | Refills: 2 | Status: SHIPPED | OUTPATIENT
Start: 2023-07-10 | End: 2023-07-11

## 2023-07-10 NOTE — PATIENT INSTRUCTIONS
Please call the office nursing staff for medication issues including refills, problems getting medications, bothersome side effects, etc at [phone]. Please return for a follow up appointment as discussed and arrive approximately 15 minutes prior to your appointment time. If you are running late or are unable to attend your appointment, please call our [facility] office at [phone], or if you were seen in the [facility]) office, please call [phone]. If you have thoughts of harming yourself or are otherwise in psychological crisis, do not hesitate to contact your [address], or 911 or go to the nearest emergency room.   [facility] Crisis: [address] Crisis: [phone]  [address] Crisis: [address] Crisis: [address] Crisis: [address] Crisis: [address] Crisis: [phone]  National Suicide Prevention Hotline: 2-465.821.1351 or call 65

## 2023-07-10 NOTE — TELEPHONE ENCOUNTER
Agnieszka Pierson  requested a call back to discuss rescheduling the missed appt    They can be reached at P# [phone]       Thank you.

## 2023-07-10 NOTE — BH TREATMENT PLAN
TREATMENT PLAN - Medication Management        [address] [facility]    Name and Date of Birth:  Cristo Braxton 32 y.o. 1995  Date of Treatment Plan: July 10, 2023  Diagnosis/Diagnoses:    1. Schizoaffective disorder, bipolar type ([address])    2. Muscle spasm    3. Anxiety disorder, unspecified type        Strengths/Personal Resources for Self-Care: "I'm reliable, kind, and compassionate."    Area/Areas of need: anxiety symptoms, paranoid thoughts, improve social life and social anxiety, be more vulnerable with family    Long Term Goal: decrease paranoid thoughts and anxiety symptoms  Target Date: 6 months - January 10, 2024  Person/Persons responsible for completion of goal: Geri and Rollene Aschoff, MD     Short Term Objective (s) - How will we reach this goal?:   1. Take medications as prescribed  2. Attend psychiatry appointments regularly  3. Continue psychotherapy regularly  4. Practice coping skills  5. Eat a healthy diet   6. Exercise regularly   Target Date: 6 months - January 10, 2024  Person/Persons Responsible for Completion of Goal: Geri     Progress Towards Goals: Continuing treatment    Treatment Modality: medication management every 1-3 months as needed  Review due 180 days from date of this plan: January 6, 2024   Expected length of service: Ongoing treatment    My physician and I have developed this plan together, and I agree to work on the goals and objectives. I understand the treatment goals that were developed for my treatment. The treatment plan was created between Rollene Aschoff, MD and Cristo Braxton on 07/10/23 at 4:17 PM but not signed at the time of the visit due to [address] [place] [address] [facility]. The plan was reviewed, and verbal consent was given.

## 2023-07-10 NOTE — PSYCH
[id number] [address]      Telemedicine consent    Patient: Krystal Vázquez  Provider: Ricco Downey MD  Provider located at [address] [place] [id number] [phone]    The patient was identified by name and date of birth. Krystal Vázquez was informed that this is a telemedicine visit and that the visit is being conducted through the [employer]. She agrees to proceed. .  My office door was closed. No one else was in the room. She acknowledged consent and understanding of privacy and security of the video platform. The patient has agreed to participate and understands they can discontinue the visit at any time. Patient is aware this is a billable service. I spent 30 minutes with the patient during this visit. Name and Date of Birth:  Krystal Vázquez 32 y.o. 1995 MRN: [id number]    Date of Visit: July 10, 2023    Reason for visit: Full psychiatric assessment for medication management, transfer of care    Chief Complaint: Medication management transfer of care, "Working on paranoia"    HPI     Krystal Vázquez is a 32 y.o. [place] Female, with no children, college education, single, domiciled with family, with past medical history of HTN, and past psychiatric history of Schizoaffective Disorder bipolar type who has been following with Dr. Linda Robert since January 2022 at the [address] [place] [address] outpatient clinic, and is now being seen as a transfer of care by this writer.     On review of her past psychiatric history per chart and discussion with [facility], she reports that in 2011 during her first year at [employer], she experienced an episode where she had lack of sleep for over 72 hours, psychomotor agitation, increased goal-oriented activity of shredding paper by hand for 8 hours, obsessive thoughts, paranoid ideation, and auditory hallucinations. She then experienced a period of depressed mood, lack of appetite, inability to care for self with difficulty performing ADLs. She then had her first admission at that time. She was started on Abilify 25 mg, but over the years has titrated down to 5 mg. She did experience some symptoms of EPS and was started on Cogentin with improvement of the symptoms. She has not experienced any similar episodes of jonnathan or hypomania or psychosis since 2011. During her most recent visit with Dr. Miroslava Carranza in May 2023, she reported medication compliance and denied side effects. She was looking forward to her college graduation but was feeling nervous about the future. She was in the process of searching for a job, and was looking forward to a summer vacation with her family. Today on evaluation, Bernie Argueta presents reporting anxiety due to job searching, has been searching since January. Has been taking abilify 15mg because of this, which she had discussed with Dr. Miroslava Carranza previously. She denies any side effects on this dosage and feels it has been helping her over the last month or so. After graduating college she has been living with mother and younger brother, feels supported at home by her family. She reports some "paranoia" regarding concern people do not like her, or that she will never get a job, but is able to talk herself out of that and think rationally about that situation. She uses prayer and music as coping skills. Will also talk to her family or therapist about issues. She has been looking to transfer care out to [facility] but waitlists are long.     Current Rating Scores:     Current PHQ-9   PHQ-2/9 Depression Screening    Little interest or pleasure in doing things: 0 - not at all  Feeling down, depressed, or hopeless: 0 - not at all  Trouble falling or staying asleep, or sleeping too much: 0 - not at all  Feeling tired or having little energy: 1 - several days  Poor appetite or overeatin - not at all  Feeling bad about yourself - or that you are a failure or have let yourself or your family down: 0 - not at all  Trouble concentrating on things, such as reading the newspaper or watching television: 0 - not at all  Moving or speaking so slowly that other people could have noticed. Or the opposite - being so fidgety or restless that you have been moving around a lot more than usual: 0 - not at all  Thoughts that you would be better off dead, or of hurting yourself in some way: 0 - not at all  PHQ-9 Score: 1   PHQ-9 Interpretation: No or Minimal depression        Current PANDA-7 is   PANDA-7 Flowsheet Screening    Flowsheet Row Most Recent Value   Over the last 2 weeks, how often have you been bothered by any of the following problems? Feeling nervous, anxious, or on edge 1   Not being able to stop or control worrying 0   Worrying too much about different things 1   Trouble relaxing 0   Being so restless that it is hard to sit still 0   Becoming easily annoyed or irritable 0   Feeling afraid as if something awful might happen 1   PANDA-7 Total Score 3      .     Psychiatric Review Of Systems:    Sleep changes: yes  Appetite changes: no  Weight changes: no  Energy/anergy: yes - due to iron deficiency  Interest/pleasure/anhedonia: no  Attention/concentration: no  Psychomotor agitation/retardation: no  Somatic symptoms: no  Anxiety/panic: Overwhelmed, stressed, intense emotions, some worrying   Niharika: no, past manic episodes in   Guilty/hopeless: no  Self injurious behavior/risky behavior: no  Suicidal ideation: no  Homicidal ideation: no  Auditory hallucinations: no  Visual hallucinations: no  Other hallucinations: no  Delusional thinking: no, but some mild paranoia related to anxiety  Obsessive/compulsive symptoms: no    Review Of Systems:    Constitutional negative   ENT negative   Cardiovascular negative   Respiratory negative   Gastrointestinal negative   Genitourinary negative Musculoskeletal negative   Integumentary negative   Neurological negative   Endocrine negative   Other Symptoms none, all other systems are negative       Past Psychiatric History:     Past psychiatric diagnoses:   • Schizoaffective disorder bipolar type  Inpatient psychiatric admissions:   • Once in 2011 at [facility] for paranoid and bizarre behavior (1.5 weeks)  Prior outpatient psychiatric treatment:   • [address] [place] [address] in 2011 with Dr. Leisa Lee  • Followed with Dr. Radha Morgan since January 2022 was previously seeing Jimy Rahman NP since August 2020  Past/current psychotherapy:   • Past: Verlin Dakin at [facility] in 2011  • Current: [facility] with Mare Ocasio  History of suicidal attempts/gestures:   • none   History of non-suicidal self-injurious behavior:   • none  History of violence/aggressive behaviors:   • none  Psychotropic medication trials:   • Cogentin, abilify, risperdal (did not like how she felt on it, took 1-2 weeks)   Substance abuse inpatient/outpatient rehabilitation:   • None  Eating disorder history:   • no    Substance Abuse History:    Denies history of alcohol, illict substance, or tobacco abuse., Patient denies previous legal actions or arrests related to substance intoxication including prior DWIs/DUIs., Patient does not exhibit objective evidence of substance withdrawal during today's examination nor do they appear under the influence of any psychoactive substance.       Family Psychiatric History:     Family History   Problem Relation Age of Onset   • Hypertension Mother    • No Known Problems Brother    • Alzheimer's disease Maternal Grandfather    • Hypertension Maternal Grandmother    • Dialysis Maternal Grandmother    • No Known Problems Brother    • No Known Problems Brother        Psychiatric Family History: Brother - mental illness  No substance use problems  Suicide Attempts: none  Patient otherwise denies known family history of psychiatric illness, substance use, or suicide attempts. Social History:    Developmental: Born in [place] [facility], migrated to [address] [place] [address] at age 7 or 4   Patient denies a history of milestone/developmental delay. and There is no documented history of 504/IEP or need for special education. Education: college graduate   Marital history: single  Children: none  Living arrangement, social support: lives with family  Occupational History: currently searching for work  Formerly  at [employer]  Yarsani Affiliation: [employer]  Access to firearms: Denies direct access to weapons/firearms. , Patient denies history of arrests or violence with a deadly weapon.  history: None    Traumatic History:     Abuse: None  Other Traumatic Events: denies    Past Medical History:    Past Medical History:   Diagnosis Date   • Acid reflux    • Schizoaffective disorder ([facility])         Past Surgical History:   Procedure Laterality Date   • CHOLECYSTECTOMY LAPAROSCOPIC N/A 11/17/2020    Procedure: Laparoscopic cholecystectomy;  Surgeon: Sanjay López MD;  Location: [facility];  Service: General     Allergies   Allergen Reactions   • Other      Seasonal allergy         History Review: The following portions of the patient's history were reviewed and updated as appropriate: allergies, current medications, past family history, past medical history, past social history, past surgical history and problem list.    OBJECTIVE:    Vital signs in last 24 hours: There were no vitals filed for this visit.     Mental Status Evaluation:    Appearance age appropriate, casually dressed   Behavior cooperative, calm   Speech normal rate, normal volume, normal pitch   Mood euthymic   Affect normal range and intensity, appropriate   Thought Processes organized, goal directed   Associations intact associations   Thought Content no overt delusions   Perceptual Disturbances: Denies auditory or visual hallucinations and Does not appear to be responding to internal stimuli   Abnormal Thoughts  Risk Potential Denies suicidal or homicidal ideation, plan, or intent   Orientation oriented to person, place, time/date and situation   Memory recent and remote memory grossly intact   Consciousness alert and awake   Attention Span Concentration Span attention span and concentration are age appropriate   Intellect appears to be of average intelligence   Insight intact   Judgement intact   Muscle Strength and  Gait unable to assess today due to virtual visit   Motor Activity unable to assess today due to virtual visit   Language no difficulty naming common objects, no difficulty repeating a phrase, no difficulty writing a sentence   Fund of Knowledge adequate knowledge of current events  adequate fund of knowledge regarding past history  adequate fund of knowledge regarding vocabulary        Laboratory Results: I have personally reviewed all pertinent laboratory/tests results    Recent Labs (last 12 months):   No visits with results within 12 Month(s) from this visit.    Latest known visit with results is:   Admission on 11/15/2020, Discharged on 11/18/2020   Component Date Value   • EXT PREG TEST UR (Ref: N* 11/15/2020 negative    • Control 11/15/2020 valid    • WBC 11/15/2020 10.88 (H)    • RBC 11/15/2020 4.63    • Hemoglobin 11/15/2020 12.9    • Hematocrit 11/15/2020 38.7    • MCV 11/15/2020 84    • MCH 11/15/2020 27.9    • MCHC 11/15/2020 33.3    • RDW 11/15/2020 14.0    • MPV 11/15/2020 10.6    • Platelets 13/57/8661 439 (H)    • nRBC 11/15/2020 0    • Neutrophils Relative 11/15/2020 56    • Immat GRANS % 11/15/2020 0    • Lymphocytes Relative 11/15/2020 34    • Monocytes Relative 11/15/2020 8    • Eosinophils Relative 11/15/2020 1    • Basophils Relative 11/15/2020 1    • Neutrophils Absolute 11/15/2020 6.20    • Immature Grans Absolute 11/15/2020 0.03    • Lymphocytes Absolute 11/15/2020 3.64    • Monocytes Absolute 11/15/2020 0.84    • Eosinophils Absolute 11/15/2020 0.12 • Basophils Absolute 11/15/2020 0.05    • Sodium 11/15/2020 140    • Potassium 11/15/2020 3.9    • Chloride 11/15/2020 106    • CO2 11/15/2020 28    • ANION GAP 11/15/2020 6    • BUN 11/15/2020 13    • Creatinine 11/15/2020 0.89    • Glucose 11/15/2020 77    • Calcium 11/15/2020 9.2    • AST 11/15/2020 210 (H)    • ALT 11/15/2020 198 (H)    • Alkaline Phosphatase 11/15/2020 132 (H)    • Total Protein 11/15/2020 7.6    • Albumin 11/15/2020 3.5    • Total Bilirubin 11/15/2020 1.81 (H)    • eGFR 11/15/2020 103    • Lipase 11/15/2020 262    • Color, UA 11/15/2020 [place]    • Clarity, UA 11/15/2020 Clear    • pH, UA 11/15/2020 7.0    • Leukocytes, UA 11/15/2020 Negative    • Nitrite, UA 11/15/2020 Negative    • Protein, UA 11/15/2020 100 (2+) (A)    • Glucose, UA 11/15/2020 Negative    • Ketones, UA 11/15/2020 15 (1+) (A)    • Urobilinogen, UA 11/15/2020 >=8.0 (A)    • Bilirubin, UA 11/15/2020 Interference- unable to analyze (A)    • Occult Blood, UA 11/15/2020 Negative    • Specific Gravity, UA 11/15/2020 1.025    • RBC, UA 11/15/2020 None Seen    • WBC, UA 11/15/2020 4-10 (A)    • Epithelial Cells 11/15/2020 Occasional    • Bacteria, UA 11/15/2020 None Seen    • MUCUS THREADS 11/15/2020 Occasional (A)    • Blood Culture 11/15/2020 No Growth After 5 Days. • Blood Culture 11/15/2020 No Growth After 5 Days.     • SARS-CoV-2 11/15/2020 Negative    • INFLUENZA A PCR 11/15/2020 Negative    • INFLUENZA B PCR 11/15/2020 Negative    • RSV PCR 11/15/2020 Negative    • Sodium 11/16/2020 143    • Potassium 11/16/2020 3.1 (L)    • Chloride 11/16/2020 111 (H)    • CO2 11/16/2020 26    • ANION GAP 11/16/2020 6    • BUN 11/16/2020 6    • Creatinine 11/16/2020 0.63    • Glucose 11/16/2020 82    • Calcium 11/16/2020 8.4    • Corrected Calcium 11/16/2020 9.4    • AST 11/16/2020 81 (H)    • ALT 11/16/2020 161 (H)    • Alkaline Phosphatase 11/16/2020 107    • Total Protein 11/16/2020 5.7 (L)    • Albumin 11/16/2020 2.8 (L)    • Total Bilirubin 11/16/2020 0.48    • eGFR 11/16/2020 142    • Phosphorus 11/16/2020 2.5 (L)    • Magnesium 11/16/2020 2.5    • Lipase 11/16/2020 252    • WBC 11/16/2020 5.48    • RBC 11/16/2020 3.80 (L)    • Hemoglobin 11/16/2020 10.5 (L)    • Hematocrit 11/16/2020 32.1 (L)    • MCV 11/16/2020 85    • MCH 11/16/2020 27.6    • MCHC 11/16/2020 32.7    • RDW 11/16/2020 14.5    • Platelets 13/64/2530 326    • MPV 11/16/2020 10.5    • EXT Preg Test, Ur 11/16/2020 Negative    • Control 11/16/2020 Valid    • Sodium 11/17/2020 146 (H)    • Potassium 11/17/2020 4.2    • Chloride 11/17/2020 115 (H)    • CO2 11/17/2020 26    • ANION GAP 11/17/2020 5    • BUN 11/17/2020 14    • Creatinine 11/17/2020 0.74    • Glucose 11/17/2020 108    • Calcium 11/17/2020 8.5    • eGFR 11/17/2020 128    • Magnesium 11/17/2020 2.7 (H)    • Total Bilirubin 11/17/2020 0.40    • Bilirubin, Direct 11/17/2020 0.18    • Alkaline Phosphatase 11/17/2020 95    • AST 11/17/2020 36    • ALT 11/17/2020 118 (H)    • Total Protein 11/17/2020 5.9 (L)    • Albumin 11/17/2020 2.9 (L)    • WBC 11/17/2020 7.21    • RBC 11/17/2020 3.67 (L)    • Hemoglobin 11/17/2020 10.2 (L)    • Hematocrit 11/17/2020 31.3 (L)    • MCV 11/17/2020 85    • MCH 11/17/2020 27.8    • MCHC 11/17/2020 32.6    • RDW 11/17/2020 14.0    • MPV 11/17/2020 10.0    • Platelets 74/61/8222 330    • nRBC 11/17/2020 0    • Neutrophils Relative 11/17/2020 65    • Immat GRANS % 11/17/2020 0    • Lymphocytes Relative 11/17/2020 24    • Monocytes Relative 11/17/2020 11    • Eosinophils Relative 11/17/2020 0    • Basophils Relative 11/17/2020 0    • Neutrophils Absolute 11/17/2020 4.66    • Immature Grans Absolute 11/17/2020 0.02    • Lymphocytes Absolute 11/17/2020 1.74    • Monocytes Absolute 11/17/2020 0.76    • Eosinophils Absolute 11/17/2020 0.01    • Basophils Absolute 11/17/2020 0.02    • Case Report 11/17/2020                      Value:Surgical Pathology Report                         Case: [id number] Authorizing Provider:  Derrell Collins MD          Collected:           11/17/2020 0725              Ordering Location:     [facility]      Received:            11/17/2020 [address] [place] [address] Operating Room                                                      Pathologist:           Jazz Cast MD                                                                  Specimen:    Gallbladder                                                                               • Final Diagnosis 11/17/2020                      Value: This result contains rich text formatting which cannot be displayed here. • Additional Information 11/17/2020                      Value: This result contains rich text formatting which cannot be displayed here. • Gross Description 11/17/2020                      Value: This result contains rich text formatting which cannot be displayed here.    • Ventricular Rate 11/16/2020 71    • Atrial Rate 11/16/2020 71    • NM Interval 11/16/2020 158    • QRSD Interval 11/16/2020 72    • QT Interval 11/16/2020 400    • QTC Interval 11/16/2020 434    • P Axis 11/16/2020 60    • QRS Axis 11/16/2020 68    • T Wave Axis 11/16/2020 33        Suicide/Homicide Risk Assessment:    Risk of Harm to Self:  • The following ratings are based on assessment at the time of the interview  • Demographic risk factors include: never   • Historical Risk Factors include: history of mood disorder, history of psychosis  • Recent Specific Risk Factors include: mental illness diagnosis, current anxiety symptoms  • Protective Factors: no current suicidal ideation, ability to adapt to change, access to mental health treatment, compliant with medications, compliant with mental health treatment, having a desire to be alive, having a sense of purpose or meaning in life, personal beliefs, resiliency, stable living environment, sense of determination, strong relationships, supportive family  • Weapons: none. The following steps have been taken to ensure weapons are properly secured: not applicable  • Based on today's assessment, Tashia Aviles presents the following risk of harm to self: minimal    Risk of Harm to Others:  • The following ratings are based on assessment at the time of the interview  • Demographic Risk Factors include: none. • Historical Risk Factors include: none. • Recent Specific Risk Factors include: none. • Protective Factors: no current homicidal ideation, ability to adapt to change, access to mental health treatment, compliant with medications, compliant with mental health treatment, good impulse control, no substance use problems, Yarsanism beliefs, resilience, responsibilities and duties to others, support system, supportive family  • Weapons: none. The following steps have been taken to ensure weapons are properly secured: not applicable  • Based on today's assessment, Tashia Aviles presents the following risk of harm to others: minimal    The following interventions are recommended: no intervention changes needed. Although patient's acute lethality risk is LOW, long-term/chronic lethality risk is mildly elevated given discussed above ., However, at the current moment, patient is future-oriented, forward-thinking, and demonstrates ability to act in a self-preserving manner as evidenced by volitionally seeking psychiatric evaluation and treatment today. . Ophelia Sol contracts for safety and is not an imminent risk of harm to self or others. Outpatient level of care is deemed appropriate at this current time. Patient understands that if they can no longer contract for safety, they need to call the office or report to their nearest Emergency Room for immediate evaluation. At this juncture, inpatient hospitalization is not currently warranted.  To mitigate future risk, patient should adhere to treatment recommendations, avoid alcohol/illicit substance use, utilize community-based resources and familiar support, and prioritize mental health treatment. Assessment/Plan:     Shanae Vilchis is a 32 y.o. [place] Female, with no children, college education, single, domiciled with family, with past medical history of HTN, and past psychiatric history of Schizoaffective Disorder bipolar type who has been following with Dr. Renetta Martinez since January 2022 at the [address] [place] [address] outpatient clinic, and is now being seen as a transfer of care by this writer. DSM-5 Diagnoses:     1. Schizoaffective disorder, bipolar type ([address])    2. Muscle spasm    3. Anxiety disorder, unspecified type        Treatment Recommendations/Precautions:  • Increase abilify to 15mg daily due to increase in paranoid thoughts and anxiety/mood symptoms  o PARQ was completed for second generation antipsychotic medication including sedation, GI distress, dizziness, risk of metabolic syndrome, EPS (akathisia, TD, etc), rare NMS, orthostatic hypotension, cardiovascular risks such as QT prolongation, increased prolactin, and others. • Continue cogentin 0.5mg BID for EPS  o PARQ for cogentin discussed including racing heart, significant anticholinergic effects (including rare psychosis; blurred vision, xerostomia, constipation, etc), N/V/C, headaches, edema, and others. • Medication management follow-up in 8 weeks  • Medication management every 8 weeks  • Continue psychotherapy with [facility] therapist Tanja Temple  • Aware of need to follow up with family physician for medical issues  • Aware of 24 hour and weekend coverage for urgent situations accessed by calling [facility] main practice number  Risks, benefits, and possible side effects of medications explained to [facility] and she verbalizes understanding and agreement for treatment.     Controlled Medication Discussion:     Not applicable    Treatment Plan:    Completed and signed during the session: Yes - Treatment Plan done but not signed at time of office visit due to:  Plan reviewed by video and verbal consent given due to [facility] social distancing      Note Share Disclaimer:      This note was not shared with the patient due to reasonable likelihood of causing patient harm      Sabrina Broussard MD 07/10/23

## 2023-07-11 ENCOUNTER — TELEPHONE (OUTPATIENT)
Dept: PSYCHIATRY | Facility: CLINIC | Age: 28
End: 2023-07-11

## 2023-07-11 DIAGNOSIS — F25.0 SCHIZOAFFECTIVE DISORDER, BIPOLAR TYPE (HCC): ICD-10-CM

## 2023-07-11 RX ORDER — ARIPIPRAZOLE 20 MG/1
20 TABLET ORAL DAILY
Qty: 30 TABLET | Refills: 2 | Status: SHIPPED | OUTPATIENT
Start: 2023-07-11 | End: 2023-10-09

## 2023-07-11 NOTE — PROGRESS NOTES
Received notice that patient called the office requesting a call back from this writer regarding medication dosages. Called patient back. She reports she has been having increased anxiety and paranoia and that despite taking the 15mg of abilify for approximately 1 week, feels this dose is not actually beneficial to her. She had not picked up the 15mg tablets from her pharmacy yet (was prescribed yesterday) and has been using 10mg tablets she has at home. This morning she felt she needed to take 25mg (took two 10mg tablets and 1/2 of 10mg tablet) for her symptoms. She requested to be increased to 25mg daily; discussed with her risk of side effects with increasing too quickly and encouraged her to try 20mg tablets for at least 5-7 days. Sent 20mg tablets to her preferred pharmacy. Because she has been on dosages above 25mg before in the past without side effect, reached an agreement that if she continues to feel paranoid and anxious after 5 days on the 20mg tablet she can break her 10mg tablet in half and add another 5mg to that to a total of 25mg. Patient reiterated this plan and is in agreement. Encouraged patient to utilize coping skills in addition to medication changes and she expressed agreement. Encouraged her to call the office with any issues, concerns, or difficulties and she expressed understanding and agreement.      Rishi Springer MD full weight-bearing

## 2023-07-11 NOTE — TELEPHONE ENCOUNTER
Patient called and stated she would like to request an increase on the abilify up to 25mg, as patient feels she is still fighting anxiety and paranoia

## 2023-07-17 ENCOUNTER — TELEMEDICINE (OUTPATIENT)
Dept: BEHAVIORAL/MENTAL HEALTH CLINIC | Facility: CLINIC | Age: 28
End: 2023-07-17
Payer: COMMERCIAL

## 2023-07-17 DIAGNOSIS — F25.0 SCHIZOAFFECTIVE DISORDER, BIPOLAR TYPE (HCC): Primary | ICD-10-CM

## 2023-07-17 DIAGNOSIS — F43.20 ADJUSTMENT DISORDER, UNSPECIFIED TYPE: ICD-10-CM

## 2023-07-17 PROCEDURE — 90832 PSYTX W PT 30 MINUTES: CPT | Performed by: PSYCHIATRY & NEUROLOGY

## 2023-07-17 NOTE — PSYCH
Virtual Regular Visit    Verification of patient location:    Patient is located at Home in the following state in which I hold an active license [place]      Assessment/Plan:    Problem List Items Addressed This Visit        Other    Adjustment disorder    Schizoaffective disorder, bipolar type ([address] ) - Primary       Reason for visit is   Chief Complaint   Patient presents with   • Virtual Regular Visit        Encounter provider OFE Perez    Provider located at [address] [place] [id number] [phone]      Recent Visits  No visits were found meeting these conditions. Showing recent visits within past 7 days and meeting all other requirements  Today's Visits  Date Type Provider Dept   07/17/23 Telemedicine [facility] Therapist [facility]   Showing today's visits and meeting all other requirements  Future Appointments  No visits were found meeting these conditions. Showing future appointments within next 150 days and meeting all other requirements       The patient was identified by name and date of birth. Moshe Pichardo was informed that this is a telemedicine visit and [facility]. She agrees to proceed. .  My office door was closed. No one else was in the room. She acknowledged consent and understanding of privacy and security of the video platform. The patient has agreed to participate and understands they can discontinue the visit at any time. Patient is aware this is a billable service.      HPI     Past Medical History:   Diagnosis Date   • Acid reflux    • Schizoaffective disorder [facility])        Past Surgical History:   Procedure Laterality Date   • CHOLECYSTECTOMY LAPAROSCOPIC N/A 11/17/2020    Procedure: Laparoscopic cholecystectomy;  Surgeon: Loulou Yousif MD;  [facility];  Service: General Current Outpatient Medications   Medication Sig Dispense Refill   • acetaminophen (TYLENOL) 325 mg tablet Take 2 tablets (650 mg total) by mouth every 6 (six) hours as needed (mild pain)     • amLODIPine (NORVASC) 5 mg tablet      • ARIPiprazole (ABILIFY) 20 MG tablet Take 1 tablet (20 mg total) by mouth daily 30 tablet 2   • benztropine (COGENTIN) 0.5 mg tablet Take 1 tablet (0.5 mg total) by mouth 2 (two) times a day 60 tablet 2   • FeroSul 325 (65 Fe) MG tablet Take 1 tablet by mouth daily with breakfast     • hydrochlorothiazide (HYDRODIURIL) 12.5 mg tablet      • ibuprofen (MOTRIN) 200 mg tablet Take 2 tablets (400 mg total) by mouth every 6 (six) hours as needed for mild pain or headaches (Take with food.) 30 tablet 0   • loratadine (CLARITIN) 10 mg tablet      • Multiple Vitamin (multivitamin) tablet Take 1 tablet by mouth     • norgestimate-ethinyl estradiol (ORTHO TRI-CYCLEN LO) 0.18/0.215/0.25 MG-25 MCG per tablet Take 1 tablet by mouth daily     • omeprazole (PriLOSEC) 40 MG capsule        No current facility-administered medications for this visit. Allergies   Allergen Reactions   • Other      Seasonal allergy         Review of Systems    Video Exam    There were no vitals filed for this visit. Physical Exam     Behavioral Health Psychotherapy Progress Note    Psychotherapy Provided: Individual Psychotherapy     1. Schizoaffective disorder, bipolar type ([address])        2. Adjustment disorder, unspecified type            Goals addressed in session: Goal 1     DATA: Declan Dickson presented for follow up, sharing that she had not been feeling well for a little while after last session. She said that she was feeling more anxious and paranoid, noting that she felt that people were "out to get me," and that she was having nightmares about that and about not being able to get a job.   She said that normally she can reason with herself and cope with the paranoia, but said that it got too overwhelming so had to increase her dose of Abilify. She said that since she went up to 20 mg, she is feeling much better. She talked about continuing to look for a job and having some interviews, but not getting any job offers. She noted that she is worried about that, but is also trying to take it easy to recuperate from her increased anxiety/paranoia, so she is not pushing too much. She also said that her iron is very low again and she is extremely tired all the time because of that, so she is resting a lot. Provided support and validation of Johanna's feelings, encouraged continued use of coping strategies and used CBT techniques to help her manage anxious thoughts. During this session, this clinician used the following therapeutic modalities: Client-centered Therapy, Cognitive Behavioral Therapy and Supportive Psychotherapy    Substance Abuse was not addressed during this session. If the client is diagnosed with a co-occurring substance use disorder, please indicate any changes in the frequency or amount of use: n/a. Stage of change for addressing substance use diagnoses: No substance use/Not applicable    ASSESSMENT:  Sierra Moseley presents with a Anxious mood. her affect is Normal range and intensity, which is congruent, with her mood and the content of the session. The client has made progress on their goals. Sierra Moseley presents with a low risk of suicide, low risk of self-harm, and minimal risk of harm to others. For any risk assessment that surpasses a "low" rating, a safety plan must be developed. A safety plan was indicated: no  If yes, describe in detail n/a    PLAN: Between sessions, Sierra Moseley will continue to focus on physical and mental wellness, and will use anxiety reduction strategies as discussed to manage anxiety.  At the next session, the therapist will use Client-centered Therapy, Cognitive Behavioral Therapy and Supportive Psychotherapy to address depression and anxiety. Behavioral Health Treatment Plan and Discharge Planning: Ladarius Gannon is aware of and agrees to continue to work on their treatment plan. They have identified and are working toward their discharge goals.  yes    Visit start and stop times:    07/17/23  Start Time: 1601  Stop Time: 1628  Total Visit Time: 27 minutes

## 2023-08-16 ENCOUNTER — TELEPHONE (OUTPATIENT)
Dept: PSYCHIATRY | Facility: CLINIC | Age: 28
End: 2023-08-16

## 2023-08-16 NOTE — TELEPHONE ENCOUNTER
At the request of billing department, this writer left a message for patient informing her that since she has moved to another county ([place]) her insurance is no longer valid. Patient is not eligible for self pay option. Requested patient call office (also given billing number) if she had any questions. Appointments for 8/17/23 and 9/21/23 with Ant Norton cancelled and Therapist was notified.

## 2023-08-17 ENCOUNTER — TELEPHONE (OUTPATIENT)
Dept: PSYCHIATRY | Facility: CLINIC | Age: 28
End: 2023-08-17

## 2023-08-17 NOTE — TELEPHONE ENCOUNTER
Patient called in response to phone call. Confirmed about insurance being out of network. Resident med appt must be cancelled. Patient requested medication scripts for a couple of months while she looks for new provider. She stated she has been calling around and getting on wait lists as she can. Sent extra Anival. For your review.

## 2023-08-18 NOTE — TELEPHONE ENCOUNTER
Based on how many refills I gave last time, she should have approximately 2 months of medication left as of now, so I cannot provide any refills right now. However I would be happy to refill after she picks up her final refill, if she still doesn't have an appointment with someone new.

## 2023-08-21 NOTE — TELEPHONE ENCOUNTER
Called and spoke with patient. Relayed info from provider. Patient was extremely appreciative. Will call when in need of medication if she is not established with another provider at that time.

## 2023-09-13 ENCOUNTER — TELEPHONE (OUTPATIENT)
Dept: PSYCHIATRY | Facility: CLINIC | Age: 28
End: 2023-09-13

## 2023-09-13 NOTE — TELEPHONE ENCOUNTER
Attempted to call patient to confirm she has continued care in her new county. Per Ludmila Lang she appears to have an appointment at another psychiatric practice. Unable to leave voicemail due to technical issues.

## 2023-09-28 ENCOUNTER — DOCUMENTATION (OUTPATIENT)
Dept: PSYCHIATRY | Facility: CLINIC | Age: 28
End: 2023-09-28

## 2023-09-28 NOTE — PSYCH
[address] [facility]    Name and Date of Birth:  Landy Cartwright 25 y.o. 1995    Admission Date: 03/24/2020  Discharge Date: 9/28/2023 Referral source: self    Discharge Type: Routine due to insurance change    Discharge Diagnosis:     Schizoaffective disorder, bipolar type     Treating Physician: Óscar Centeno MD     Treatment Complications: None. Admit with discharge: No    Prognosis at time of discharge: Good    Presenting Problems/Pertinent Findings:      Geri initially presented for treatment due to mood instability and paranoid ideation. Primary complaints included MOOD DISORDER SYMPTOMS: elevated mood, increase in goal directed activity, decreased need for sleep and PSYCHOTIC SYMPTOMS: paranoid ideation. Therapist: Previously Shaila Downing, now transferring out of clinic due to move/insurance change    Course of Treatment: Medication Management    Summary of Treatment Progress:     Tamie Hassan was seen for initial Psychiatric Evaluation and medication management. During the course of treatment she demonstrated improvement in mood stability and symptoms of psychosis. At the last visit on 7/10/23 she denies suicidal ideation, intent or plan, denies any homicidal ideation, intent or plan. She denies any auditory hallucinations, denies any visual hallucinations, denies any delusional thinking. She denies any side effects from psychiatric medications. She has been compliant with treatment.     Past Psychiatric History:   Past psychiatric diagnoses:   • Schizoaffective disorder bipolar type  Inpatient psychiatric admissions:   • Once in 2011 at [facility] for paranoid/bizarre behavior (1.5 weeks)  Prior outpatient psychiatric treatment:   • [address] [place] [address] in 2011 with Dr. Mikaela Cobb  • Followed with Dr. Ally Garcia since January 2022 was previously seeing Iwona Soria NP since August 2020  Past/current psychotherapy: • Past: Klaus Hernandez at [facility] in 2011  • Current: [facility] with Nancy Cassette  History of suicidal attempts/gestures:   • none      History of non-suicidal self-injurious behavior:   • none  History of violence/aggressive behaviors:   • none  Psychotropic medication trials:   • Cogentin, abilify, risperdal (did not like how she felt on it, took 1-2 weeks)     Traumatic History:     Abuse: none  Other Traumatic Events: none     Past Medical History:    Past Medical History:   Diagnosis Date   • Acid reflux    • Schizoaffective disorder ([address])         Past Surgical History:   Procedure Laterality Date   • CHOLECYSTECTOMY LAPAROSCOPIC N/A 11/17/2020    Procedure: Laparoscopic cholecystectomy;  Surgeon: Roland Lomax MD;  Location[facility];  Service: General       Allergies: Allergies   Allergen Reactions   • Other      Seasonal allergy         Substance Abuse History:     Social History     Substance and Sexual Activity   Drug Use Never     Social History     Substance and Sexual Activity   Alcohol Use Not Currently    Comment: sip at a wedding       Family Psychiatric History:     Family History   Problem Relation Age of Onset   • Hypertension Mother    • No Known Problems Brother    • Alzheimer's disease Maternal Grandfather    • Hypertension Maternal Grandmother    • Dialysis Maternal Grandmother    • No Known Problems Brother    • No Known Problems Brother        Social History/Trauma History/Past Psychiatric History:    Social History     Socioeconomic History   • Marital status: Single     Spouse name: Not on file   • Number of children: 0   • Years of education: Not on file   • Highest education level:  Bachelor's degree (e.g., BA, AB, BS)   Occupational History   • 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[employer]   • Smoking status: Never   • Smokeless tobacco: Never   Vaping Use   • Vaping Use: Never used   Substance and Sexual Activity   • Alcohol use: Not Currently     Comment: sip at a wedding   • Drug use: Never   • Sexual activity: Not Currently     Birth control/protection: OCP   Other Topics Concern   • Not on file   Social History Narrative   • Not on file     Social Determinants of Health     Financial Resource Strain: Not on file   Food Insecurity: Not on file   Transportation Needs: Not on file   Physical Activity: Inactive (4/20/2021)    Exercise Vital Sign    • Days of Exercise per Week: 0 days    • Minutes of Exercise per Session: 0 min   Stress: Not on file   Social Connections: Not on file   Intimate Partner Violence: Not At Risk (4/20/2021)    Humiliation, Afraid, Rape, and Kick questionnaire    • Fear of Current or Ex-Partner: No    • Emotionally Abused: No    • Physically Abused: No    • Sexually Abused: No   Housing Stability: Not on file       History Review: The following portions of the patient's history were reviewed and updated as appropriate: allergies, current medications, past family history, past medical history, past social history, past surgical history and problem list.. Reviewed on 7/10/23. MENTAL STATUS EVALUATION (at time of most recent visit on 7/10/23):   Appearance age appropriate, casually dressed   Behavior cooperative, calm   Speech normal rate, normal volume, normal pitch   Mood euthymic   Affect normal range and intensity, appropriate   Thought Processes organized, goal directed   Associations intact associations   Thought Content no overt delusions   Perceptual Disturbances: Denies auditory or visual hallucinations and Does not appear to be responding to internal stimuli   Abnormal Thoughts  Risk Potential Denies suicidal or homicidal ideation, plan, or intent   Orientation oriented to person, place, time/date and situation   Memory recent and remote memory grossly intact   Consciousness alert and awake   Attention Span Concentration Span attention span and concentration are age appropriate   Intellect appears to be of average intelligence   Insight intact   Judgement intact   Muscle Strength and  Gait unable to assess today due to virtual visit   Motor Activity unable to assess today due to virtual visit   Language no difficulty naming common objects, no difficulty repeating a phrase, no difficulty writing a sentence   Fund of Knowledge adequate knowledge of current events  adequate fund of knowledge regarding past history  adequate fund of knowledge regarding vocabulary           Pain: denied  Pain scale: n/a    To what extent did Geri achieve her goals?: Most    Criteria for Discharge: Change in insurance. Aftercare Recommendations:     • Follow up with therapist recommended. • Follow up with psychiatrist recommended.     Discharge Medications:     Current Outpatient Medications:   •  acetaminophen (TYLENOL) 325 mg tablet, Take 2 tablets (650 mg total) by mouth every 6 (six) hours as needed (mild pain), Disp:  , Rfl:   •  amLODIPine (NORVASC) 5 mg tablet, , Disp: , Rfl:   •  ARIPiprazole (ABILIFY) 20 MG tablet, Take 1 tablet (20 mg total) by mouth daily, Disp: 30 tablet, Rfl: 2  •  benztropine (COGENTIN) 0.5 mg tablet, Take 1 tablet (0.5 mg total) by mouth 2 (two) times a day, Disp: 60 tablet, Rfl: 2  •  FeroSul 325 (65 Fe) MG tablet, Take 1 tablet by mouth daily with breakfast, Disp: , Rfl:   •  hydrochlorothiazide (HYDRODIURIL) 12.5 mg tablet, , Disp: , Rfl:   •  ibuprofen (MOTRIN) 200 mg tablet, Take 2 tablets (400 mg total) by mouth every 6 (six) hours as needed for mild pain or headaches (Take with food.), Disp: 30 tablet, Rfl: 0  •  loratadine (CLARITIN) 10 mg tablet, , Disp: , Rfl:   •  Multiple Vitamin (multivitamin) tablet, Take 1 tablet by mouth, Disp: , Rfl:   •  norgestimate-ethinyl estradiol (ORTHO TRI-CYCLEN LO) 0.18/0.215/0.25 MG-25 MCG per tablet, Take 1 tablet by mouth daily, Disp: , Rfl:   •  omeprazole (PriLOSEC) 40 MG capsule, , Disp: , Rfl:      Describe ability and willingness to work and solve mental problems:     Able to solve her mental health problems appropriately and seek care.         Pat Ba MD 09/28/23

## 2023-12-06 ENCOUNTER — DOCUMENTATION (OUTPATIENT)
Dept: BEHAVIORAL/MENTAL HEALTH CLINIC | Facility: CLINIC | Age: 28
End: 2023-12-06

## 2023-12-06 DIAGNOSIS — F25.0 SCHIZOAFFECTIVE DISORDER, BIPOLAR TYPE (HCC): ICD-10-CM

## 2023-12-06 DIAGNOSIS — F43.20 ADJUSTMENT DISORDER, UNSPECIFIED TYPE: Primary | ICD-10-CM

## 2023-12-06 NOTE — PROGRESS NOTES
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, and at this point does not know if the school will be in person, virtual or hybrid, which is creating a little bit of anxiety for her, but not too much. She said that she had been working with Birgit William on trying to reflect on situations and not rush to judgment or reacting rashly, and provided an example of ways that she has been implementing her work on this skill. Today she was ordering a new mattress for her new apartment, and the agent she was ordering from put in the wrong address at first.  The agent made the correction after the purchase was completed, and told Geri that she had notified the warehouse of the correct address. Geri asked the agent three times if she was sure that the information was corrected and that the warehouse definitely got the correct address. The agent abruptly told her that it was taken care of and ended the chat. Isiah Suyapa said that she could have gotten angry or upset about that interaction, but took a minute to think about it, decided that she did not think she was being unreasonable trying to be sure that something she was spending a lot of money on would be delivered correctly, and that maybe the agent was just tired or was not able to manage the conversation as well as could have been done. She decided not to let it upset her and planned to contact another agent to confirm that the change was made instead, just to alleviate any anxiety that she might feel about the issue.      Course of treatment included : individual therapy     Progress/Outcome of Treatment Goals (brief summary of course of treatment) Charmaine Mccray made good progress toward her goals of managing her mood and anxiety, as well as challenging negative thoughts and unrealistic worries. She worked on becoming more open to social interaction, and while she seemed to be comfortable with being alone, she was able to be more assertive and confident in social interactions. Treatment Complications (if any): moved out of [facility] does not participate with new insurance    Treatment Progress: good    Current [facility] Psychiatric Provider: none    Discharge Medications include: none    Discharge Date: 12/6/2023    Discharge Diagnosis:   1. Adjustment disorder, unspecified type        2.  Schizoaffective disorder, bipolar type ([address])            Criteria for Discharge: change in Insurance    Aftercare recommendations include (include specific referral names and phone numbers, if appropriate): find new therapist/medication provider in [facility]    Prognosis: good negative...

## 2023-12-07 ENCOUNTER — TELEPHONE (OUTPATIENT)
Dept: PSYCHIATRY | Facility: CLINIC | Age: 28
End: 2023-12-07

## 2023-12-12 NOTE — TELEPHONE ENCOUNTER
DISCHARGE LETTER for OFE Delarosa, GUDELIAW (certified and regular) placed in outgoing mail on 12/12/23.     Article #:  [id number]    Address:  [address] [place] [address]

## (undated) DEVICE — GLOVE INDICATOR PI UNDERGLOVE SZ 8 BLUE

## (undated) DEVICE — SUT VICRYL 0 UR-6 27 IN J603H

## (undated) DEVICE — GLOVE SRG BIOGEL 8

## (undated) DEVICE — SUT MONOCRYL 4-0 PS-2 18 IN Y496G

## (undated) DEVICE — ELECTRODE LAP J HOOK E-Z CLEAN 33CM-0021

## (undated) DEVICE — PENCIL ELECTROSURG E-Z CLEAN -0035H

## (undated) DEVICE — CHLORAPREP HI-LITE 26ML ORANGE

## (undated) DEVICE — TROCAR: Brand: KII FIOS FIRST ENTRY

## (undated) DEVICE — 5 MM CURVED DISSECTORS WITH MONOPOLAR CAUTERY: Brand: ENDOPATH

## (undated) DEVICE — NEEDLE 25G X 1 1/2

## (undated) DEVICE — LIGAMAX 5 MM ENDOSCOPIC MULTIPLE CLIP APPLIER: Brand: LIGAMAX

## (undated) DEVICE — 3M™ STERI-STRIP™ COMPOUND BENZOIN TINCTURE 40 BAGS/CARTON 4 CARTONS/CASE C1544: Brand: 3M™ STERI-STRIP™

## (undated) DEVICE — PLASTIC ADHESIVE BANDAGE: Brand: CURITY

## (undated) DEVICE — TISSUE RETRIEVAL SYSTEM: Brand: INZII RETRIEVAL SYSTEM

## (undated) DEVICE — TROCAR: Brand: KII® SLEEVE

## (undated) DEVICE — INTENDED FOR TISSUE SEPARATION, AND OTHER PROCEDURES THAT REQUIRE A SHARP SURGICAL BLADE TO PUNCTURE OR CUT.: Brand: BARD-PARKER SAFETY BLADES SIZE 11, STERILE

## (undated) DEVICE — PACK PBDS LAP CHOLE RF

## (undated) DEVICE — 3M™ STERI-STRIP™ REINFORCED ADHESIVE SKIN CLOSURES, R1547, 1/2 IN X 4 IN (12 MM X 100 MM), 6 STRIPS/ENVELOPE: Brand: 3M™ STERI-STRIP™